# Patient Record
Sex: FEMALE | Race: WHITE | Employment: OTHER | URBAN - METROPOLITAN AREA
[De-identification: names, ages, dates, MRNs, and addresses within clinical notes are randomized per-mention and may not be internally consistent; named-entity substitution may affect disease eponyms.]

---

## 2019-04-29 ENCOUNTER — TRANSFERRED RECORDS (OUTPATIENT)
Dept: HEALTH INFORMATION MANAGEMENT | Facility: CLINIC | Age: 74
End: 2019-04-29

## 2019-06-13 ENCOUNTER — TRANSFERRED RECORDS (OUTPATIENT)
Dept: HEALTH INFORMATION MANAGEMENT | Facility: CLINIC | Age: 74
End: 2019-06-13

## 2019-07-19 ENCOUNTER — DOCUMENTATION ONLY (OUTPATIENT)
Dept: CARE COORDINATION | Facility: CLINIC | Age: 74
End: 2019-07-19

## 2019-07-22 ENCOUNTER — PRE VISIT (OUTPATIENT)
Dept: NEUROLOGY | Facility: CLINIC | Age: 74
End: 2019-07-22

## 2019-07-22 NOTE — TELEPHONE ENCOUNTER
FUTURE VISIT INFORMATION      FUTURE VISIT INFORMATION:    Date: 8/1/2019    Time: 830AM    [place] [facility] [place]ERR[place] INFORMATION:    Referring provider:  Self    Referring providers clinic:      Reason for visit/diagnosis  Headaches     RECORDS REQUESTED FROM:       Clinic name Comments Records Status Imaging Status   [facility]   Requested  Requested to PACS

## 2019-11-12 ENCOUNTER — PRE VISIT (OUTPATIENT)
Dept: NEUROLOGY | Facility: CLINIC | Age: 74
End: 2019-11-12

## 2020-09-22 NOTE — TELEPHONE ENCOUNTER
FUTURE VISIT INFORMATION      FUTURE VISIT INFORMATION:    Date: 1/22/2020    Time: 11AM    [place] [facility] [place] INFORMATION:    Referring provider:  Self     Referring providers clinic:      Reason for visit/diagnosis  Headaches     RECORDS REQUESTED FROM:       Clinic name Comments Records Status Imaging Status   [facility]  Care Everywhere N/A    [facility]  Care [facility] N/A                                
Hide Include Location In Plan Question?: No
Detail Level: Generalized
Include Location In Plan?: Yes
Detail Level: Zone
Detail Level: Detailed

## 2020-12-14 DIAGNOSIS — R41.3 AMNESIA: Primary | ICD-10-CM

## 2020-12-14 PROBLEM — M15.0 PRIMARY OSTEOARTHRITIS INVOLVING MULTIPLE JOINTS: Status: ACTIVE | Noted: 2017-02-22

## 2020-12-14 PROBLEM — Z01.83 BLOOD TYPING ENCOUNTER: Status: ACTIVE | Noted: 2017-03-20

## 2020-12-14 PROBLEM — K57.30 DIVERTICULAR DISEASE OF LARGE INTESTINE: Status: ACTIVE | Noted: 2020-01-30

## 2020-12-14 PROBLEM — L97.929: Status: ACTIVE | Noted: 2019-01-30

## 2020-12-14 PROBLEM — E66.9 OBESITY: Status: ACTIVE | Noted: 2020-12-14

## 2020-12-14 PROBLEM — E73.9 LACTOSE INTOLERANCE: Status: ACTIVE | Noted: 2020-10-14

## 2020-12-14 PROBLEM — K58.0 IRRITABLE BOWEL SYNDROME WITH DIARRHEA: Status: ACTIVE | Noted: 2020-10-14

## 2020-12-14 PROBLEM — M47.816 SPONDYLOSIS OF LUMBAR REGION WITHOUT MYELOPATHY OR RADICULOPATHY: Status: ACTIVE | Noted: 2017-03-07

## 2020-12-14 PROBLEM — F41.8 DEPRESSION WITH ANXIETY: Status: ACTIVE | Noted: 2020-12-14

## 2020-12-14 PROBLEM — K57.92 DIVERTICULITIS: Status: ACTIVE | Noted: 2020-12-14

## 2020-12-14 PROBLEM — R51.9 CHRONIC HEADACHE DISORDER: Status: ACTIVE | Noted: 2020-12-14

## 2020-12-14 PROBLEM — I50.31: Status: ACTIVE | Noted: 2019-10-12

## 2020-12-14 PROBLEM — G89.29 CHRONIC HEADACHE DISORDER: Status: ACTIVE | Noted: 2020-12-14

## 2020-12-14 PROBLEM — I83.029: Status: ACTIVE | Noted: 2019-01-30

## 2020-12-14 RX ORDER — DONEPEZIL HYDROCHLORIDE 10 MG/1
10 TABLET, FILM COATED ORAL AT BEDTIME
COMMUNITY
Start: 2019-04-29 | End: 2020-12-14

## 2020-12-14 NOTE — TELEPHONE ENCOUNTER
Patient is overdue for follow up. Will call and schedule her a video or face to face   Medication T'd for review and signature    Galo Louis CMA on 12/14/2020 at 10:48 AM

## 2020-12-14 NOTE — TELEPHONE ENCOUNTER
Pt calling to get her Donepezil refilled. Sh mentioned getting it from her PMD, but is having issues with that.She uses [facility] [place] on [address].  Please call to discuss. Ok to lm

## 2020-12-15 RX ORDER — DONEPEZIL HYDROCHLORIDE 10 MG/1
10 TABLET, FILM COATED ORAL AT BEDTIME
Qty: 30 TABLET | Refills: 0 | Status: SHIPPED | OUTPATIENT
Start: 2020-12-15 | End: 2021-01-14

## 2021-02-11 ENCOUNTER — IMMUNIZATION (OUTPATIENT)
Dept: PEDIATRICS | Facility: CLINIC | Age: 76
End: 2021-02-11
Payer: COMMERCIAL

## 2021-02-11 PROCEDURE — 91300 PR COVID VAC PFIZER DIL RECON 30 MCG/0.3 ML IM: CPT

## 2021-02-11 PROCEDURE — 0001A PR COVID VAC PFIZER DIL RECON 30 MCG/0.3 ML IM: CPT

## 2021-03-04 ENCOUNTER — IMMUNIZATION (OUTPATIENT)
Dept: PEDIATRICS | Facility: CLINIC | Age: 76
End: 2021-03-04
Attending: INTERNAL MEDICINE
Payer: COMMERCIAL

## 2021-03-04 PROCEDURE — 91300 PR COVID VAC PFIZER DIL RECON 30 MCG/0.3 ML IM: CPT

## 2021-03-04 PROCEDURE — 0002A PR COVID VAC PFIZER DIL RECON 30 MCG/0.3 ML IM: CPT

## 2021-03-28 ENCOUNTER — HEALTH MAINTENANCE LETTER (OUTPATIENT)
Age: 76
End: 2021-03-28

## 2021-05-28 ENCOUNTER — RECORDS - HEALTHEAST (OUTPATIENT)
Dept: ADMINISTRATIVE | Facility: CLINIC | Age: 76
End: 2021-05-28

## 2021-09-11 ENCOUNTER — HEALTH MAINTENANCE LETTER (OUTPATIENT)
Age: 76
End: 2021-09-11

## 2021-11-19 ENCOUNTER — MEDICAL CORRESPONDENCE (OUTPATIENT)
Dept: HEALTH INFORMATION MANAGEMENT | Facility: CLINIC | Age: 76
End: 2021-11-19
Payer: COMMERCIAL

## 2021-11-23 ENCOUNTER — TRANSCRIBE ORDERS (OUTPATIENT)
Dept: OTHER | Age: 76
End: 2021-11-23
Payer: COMMERCIAL

## 2021-11-23 DIAGNOSIS — G44.89 HEADACHE SYNDROME: Primary | ICD-10-CM

## 2021-12-10 ENCOUNTER — OFFICE VISIT (OUTPATIENT)
Dept: NEUROLOGY | Facility: CLINIC | Age: 76
End: 2021-12-10
Payer: COMMERCIAL

## 2021-12-10 VITALS
DIASTOLIC BLOOD PRESSURE: 91 MMHG | SYSTOLIC BLOOD PRESSURE: 172 MMHG | WEIGHT: 180 LBS | HEART RATE: 83 BPM | HEIGHT: 64 IN | BODY MASS INDEX: 30.73 KG/M2

## 2021-12-10 DIAGNOSIS — Z82.49 FAMILY HISTORY OF ANEURYSM: Primary | ICD-10-CM

## 2021-12-10 DIAGNOSIS — G43.719 INTRACTABLE CHRONIC MIGRAINE WITHOUT AURA AND WITHOUT STATUS MIGRAINOSUS: ICD-10-CM

## 2021-12-10 PROCEDURE — 99205 OFFICE O/P NEW HI 60 MIN: CPT | Performed by: PSYCHIATRY & NEUROLOGY

## 2021-12-10 RX ORDER — SUMATRIPTAN 25 MG/1
25 TABLET, FILM COATED ORAL
Qty: 18 TABLET | Refills: 1 | Status: SHIPPED | OUTPATIENT
Start: 2021-12-10 | End: 2022-10-12

## 2021-12-10 RX ORDER — AMLODIPINE BESYLATE 5 MG/1
5 TABLET ORAL DAILY
COMMUNITY

## 2021-12-10 RX ORDER — FAMOTIDINE 20 MG/1
TABLET, FILM COATED ORAL
COMMUNITY
Start: 2021-05-28 | End: 2022-10-12

## 2021-12-10 RX ORDER — LEVOTHYROXINE SODIUM 112 UG/1
TABLET ORAL
COMMUNITY
Start: 2021-09-01 | End: 2022-10-12

## 2021-12-10 RX ORDER — AMLODIPINE BESYLATE 2.5 MG/1
2.5 TABLET ORAL
COMMUNITY
Start: 2021-09-09 | End: 2022-10-12

## 2021-12-10 RX ORDER — DONEPEZIL HYDROCHLORIDE 10 MG/1
TABLET, FILM COATED ORAL
COMMUNITY
Start: 2020-08-14

## 2021-12-10 RX ORDER — TOPIRAMATE 25 MG/1
TABLET, FILM COATED ORAL
Qty: 120 TABLET | Refills: 1 | Status: SHIPPED | OUTPATIENT
Start: 2021-12-10 | End: 2022-10-12

## 2021-12-10 RX ORDER — PAROXETINE 40 MG/1
40 TABLET, FILM COATED ORAL EVERY MORNING
COMMUNITY
Start: 2021-08-23 | End: 2022-10-12

## 2021-12-10 RX ORDER — DIETHYLTOLUAMIDE 7 %
600 SPRAY, NON-AEROSOL (ML) TOPICAL
COMMUNITY
Start: 2021-05-15

## 2021-12-10 RX ORDER — ACETAMINOPHEN 500 MG
1000 TABLET ORAL 4 TIMES DAILY
COMMUNITY

## 2021-12-10 RX ORDER — LISINOPRIL 40 MG/1
TABLET ORAL
COMMUNITY
Start: 2021-08-01 | End: 2022-10-12

## 2021-12-10 RX ORDER — CYCLOBENZAPRINE HCL 5 MG
5 TABLET ORAL AT BEDTIME
COMMUNITY
Start: 2021-01-21 | End: 2022-10-12

## 2021-12-10 RX ORDER — NITROGLYCERIN 0.4 MG/1
TABLET SUBLINGUAL
COMMUNITY
Start: 2021-09-09 | End: 2022-10-12

## 2021-12-10 RX ORDER — HYDROCHLOROTHIAZIDE 12.5 MG/1
12.5 TABLET ORAL DAILY
COMMUNITY
Start: 2021-09-01 | End: 2022-10-12

## 2021-12-10 RX ORDER — FUROSEMIDE 20 MG
20 TABLET ORAL EVERY MORNING
COMMUNITY
Start: 2021-08-01 | End: 2022-10-27

## 2021-12-10 RX ORDER — MULTIVITAMIN,THERAPEUTIC
1 TABLET ORAL DAILY
COMMUNITY
End: 2022-10-12

## 2021-12-10 RX ORDER — ACETAMINOPHEN 500 MG
500 TABLET ORAL
COMMUNITY
Start: 2021-10-23 | End: 2022-10-12

## 2021-12-10 RX ORDER — NADOLOL 40 MG/1
40 TABLET ORAL DAILY
COMMUNITY
Start: 2021-08-23 | End: 2022-10-12

## 2021-12-10 ASSESSMENT — MIFFLIN-ST. JEOR: SCORE: 1281.47

## 2021-12-10 NOTE — NURSING NOTE
Chief Complaint   Patient presents with     New Patient     headache      Melany Muhammad MA on 12/10/2021 at 11:58 AM

## 2021-12-10 NOTE — LETTER
12/10/2021         RE: Mackenzie Brown  [address]  [place] [address]        Dear Colleague,    Thank you for referring your patient, Mackenzie Brown, to the [facility] [place]. Please see a copy of my visit note below.    NEUROLOGY OUTPATIENT CONSULT NOTE   Dec 10, 2021     CHIEF COMPLAINT/REASON FOR VISIT/REASON FOR CONSULT  Patient presents with:  New Patient: headache     REASON FOR CONSULTATION- Headaches    REFERRAL SOURCE  Dr. Quinn Lainez  CC Dr. Quinn Lainez    HISTORY OF PRESENT ILLNESS  Mackenzie Brown is a 76 year old female seen today for evaluation of headaches.  She reports that she has been having headaches since age 12.  Headaches used to be severe that she was throwing up and had to stay in a dark room.  She is tried multiple medications when she was in her 20s.  She eventually outgrew her migraines and they went away.  She did find benefit with some Imitrex at that point.  Headaches improved with use of meditation, biofeedback.  Not take any medications that really helped her.    Over the last 2 to 3 years the headaches have been coming back.  There slowly getting more and more progressive.  She has been using Tylenol 2 tablets in the morning 2 tablets in the evening.  When headaches are more severe she will get Excedrin Migraine.  Has had a pacemaker put in in the last 2 weeks.  Headaches are generally frontal.  They are dull in nature.  No significant photophobia or phonophobia though occasionally can get auras.  They happen about once a year.  Has had Covid recently which has not affected the headaches.  Currently has not tried any prescription medications.  Does have issues with sleep as she does not use her CPAP.  Uses trazodone at nighttime for sleep.    Her daughter has history of cerebral aneurysms.  She is a smoker.  No other family members have cerebral aneurysms.    Previous history is reviewed and this is unchanged.    PAST  MEDICAL/SURGICAL HISTORY  No past medical history on file.  Patient Active Problem List   Diagnosis     Adrenal nodule (H)     PAF (paroxysmal atrial fibrillation) (H)     AK (actinic keratosis)     Amnesia     Blood typing encounter     Bunion     Cataracts, both eyes     CHF (congestive heart failure), NYHA class I, acute, diastolic (H)     Chronic back pain     Chronic headache disorder     Complications of medical care     Depression with anxiety     Diverticular disease of large intestine     Diverticulitis     Diverticulitis of colon     Eczema     Fibula fracture     GERD (gastroesophageal reflux disease)     History of colonic polyps     Hypertension     Insomnia     Irritable bowel syndrome with diarrhea     Knee pain, left     Lactose intolerance     Major depressive disorder, recurrent episode, mild (H)     Malignant neoplasm of skin     Migraine     Mild cognitive impairment     Multiple thyroid nodules     Obesity     Osteoarthrosis     Primary osteoarthritis involving multiple joints     Senile osteoporosis     Sensorineural hearing loss, asymmetrical     Spondylosis of lumbar region without myelopathy or radiculopathy     Tibial plateau fracture     Ulcer of varicose vein of left leg (H)     Wrist fracture, left     Neck pain   Significant for high blood pressure, migraines, arthritis, osteoporosis, hypothyroidism, pacemaker.  No kidney stone history    FAMILY HISTORY  No family history on file.   Brother with aneurysm.  Has history of smoking.    SOCIAL HISTORY  Social History     Tobacco Use     Smoking status: Never Smoker     Smokeless tobacco: Never Used   Substance Use Topics     Alcohol use: Yes     Drug use: Never       SYSTEMS REVIEW  Twelve-system ROS was done and other than the HPI this was negative except for neck pain, back pain, difficulty walking, balance coordination problems, dizziness, vision symptoms, sleepiness during the day, headaches, anxiety, cardiac/heart problems, snoring  "loudly, difficulty breathing during sleep    MEDICATIONS  acetaminophen (TYLENOL) 500 MG tablet, Take 500-1,000 mg by mouth every 6 hours as needed for mild pain  acetaminophen (TYLENOL) 500 MG tablet, Take 500 mg by mouth  amLODIPine (NORVASC) 2.5 MG tablet, Take 2.5 mg by mouth  amLODIPine (NORVASC) 5 MG tablet, Take 5 mg by mouth daily  apixaban ANTICOAGULANT (ELIQUIS) 5 MG tablet, Take 5 mg by mouth 2 times daily  ascorbic acid 1000 MG TABS tablet, Take 1 tablet by mouth daily  Calcium Polycarbophil 625 MG CHEW, Take 500 mg by mouth  Calcium-Magnesium-Vitamin D (CITRACAL SLOW RELEASE) 600- MG-MG-UNIT TB24, Take 600 mg by mouth  cholecalciferol 50 MCG (2000 UT) CAPS, Take 2,000 Units by mouth  cyclobenzaprine (FLEXERIL) 5 MG tablet, Take 5 mg by mouth At Bedtime  donepezil (ARICEPT) 10 MG tablet,   famotidine (PEPCID) 20 MG tablet, Take 1 Tablet (20 mg) by mouth 2 times daily.  furosemide (LASIX) 20 MG tablet, Take 20 mg by mouth every morning  hydrochlorothiazide (HYDRODIURIL) 12.5 MG tablet, Take 12.5 mg by mouth daily  levothyroxine (SYNTHROID/LEVOTHROID) 112 MCG tablet, Take 1 Tablet  by mouth before breakfast.  lisinopril (ZESTRIL) 40 MG tablet, Take one tablet by mouth once daily at bedtime  Multiple Vitamin (THERA-TABS) TABS, Take 1 tablet by mouth daily  nadolol (CORGARD) 40 MG tablet, Take 40 mg by mouth daily  nitroGLYcerin (NITROSTAT) 0.4 MG sublingual tablet, dissolve 1 Tablet (0.4 mg) under the tongue every 5 minutes as needed  PARoxetine (PAXIL) 40 MG tablet, Take 40 mg by mouth every morning    No current facility-administered medications on file prior to visit.       PHYSICAL EXAMINATION  VITALS: BP (!) 172/91 (BP Location: Left arm, Patient Position: Sitting)   Pulse 83   Ht 1.626 m (5' 4\")   Wt 81.6 kg (180 lb)   BMI 30.90 kg/m    GENERAL: Healthy appearing, alert, no acute distress, normal habitus.  CARDIOVASCULAR: Extremities warm and well perfused. Pulses present.   EYES: " Funduscopic exam limited.  NEUROLOGICAL:  Patient is awake and oriented to self, place and time.  Attention span is normal.  Memory is grossly intact.  Language is fluent and follows commands appropriately.  Appropriate fund of knowledge. Cranial nerves 2-12 are intact. There is no pronator drift.  Motor exam shows 5/5 strength in all extremities.  Tone is symmetric bilaterally in upper and lower extremities.  Reflexes are symmetric and 1+ in upper extremities and lower extremities. Sensory exam is grossly intact to light touch, pin prick and vibration.  Finger to nose and heel to shin is without dysmetria.  Romberg is negative.  Gait is normal and the patient is able to do tandem walk and walk on toes and heels.    DIAGNOSTICS  MRI  MRI brain 2016  1 mild age-related changes  2.  No significant superimposed intracranial finding    Neuropsych 2014  DIAGNOSTIC IMPRESSIONS   Mild Cognitive Impairment, amnestic type   Anxiety, by history     Head CT  IMPRESSION:     1.  No evidence of acute intracranial hemorrhage or mass effect.   2.  Mild nonspecific white matter changes.   3.  Mild brain parenchymal volume loss.    CT angiogram  CONCLUSIONS:   1. The patient's total Agatston calcium score is zero.   2. Angiographically normal coronary arteries in all visualized segments by   CT criteria.   3. Well-opacified left atrial appendage without thrombus.     4. Please see radiology report below for incidental non-cardiac findings.     CT head Jan 2015  IMPRESSION:   1.  Redemonstrated small posterior right parietal scalp subgaleal hematoma with no skull fracture. No acute intracranial abnormality.    RELEVANT LABS  TSH 18.77  T4 1.32-on medication  TSH 0.25 on medication    OUTSIDE RECORDS  Previous notes from 2016 from Dr. Harrington were reviewed.  Patient's been diagnosed with Alzheimer's dementia.  Was put on Aricept at that time.  She was having headaches at that time.  These were thought to be medication overuse  headaches.  Other chart notes were reviewed.  No relevant notes available.    IMPRESSION/REPORT/PLAN  Family history of cerebral aneurysm  Intractable chronic migraine without aura and without status migrainosus  Sleep apnea/insomnia     This is a 76 year old female with history of migraines with resolution with recurrence of headaches that are much more frequent and severe and family history of cerebral aneurysms in her daughter who is also a smoker.  Patient is never smoked in her lifetime.  I would like to screen her for aneurysms given the family history and given the worsening of headaches.  She did have a head CT that was normal.  We will check a MRI of the brain.  She does have history of hypothyroidism.  Coronary angiogram was negative for coronary artery disease.  We will check blood work to look for other causes of headaches.    In terms of treatment of headache I will start her on Topamax.  Antidepressant was discussed and she is on Paxil and does not want to try more antidepressants.  We further talked about using a beta-blocker though she is already on nadolol and other blood pressure medications.  Possibly her headaches could be related to sleep apnea and I would encourage her to use her mask.      Will use Imitrex for abortive therapy.  It is possibly helped in the past.  She does not have any history of coronary artery disease.  Encouraged her not to overuse her abortive medications.  Discussed risk of medication overuse headaches.    Would encourage her to keep a log of her headaches I can see her back in 3 months.    -     MRA Head w/o Contrast Angiogram; Future  -     SUMAtriptan (IMITREX) 25 MG tablet; Take 1 tablet (25 mg) by mouth at onset of headache for migraine May repeat in 2 hours.  -     Vitamin B12; Future  -     Ferritin; Future  -     topiramate (TOPAMAX) 25 MG tablet; Start with 1 tab/night and increase by 1 tablet/week as needed and tolerated to a max of 2 tabs/night.    Return in  about 3 months (around 3/10/2022) for In-Clinic Visit, After testing.    Over 64 minutes were spent coordinating the care for the patient on the day of the encounter.  This includes previsit, during visit and post visit activities as documented above.  Multiple tests reviewed with the patient.  Counseling patient.  Multiple problems addressed.  (Activities include but not inclusive of reviewing chart, reviewing outside records, reviewing labs and imaging study results as well as the images, patient visit time including getting history and exam,  use if applicable, review of test results with the patient and coming up with a plan in a shared model, counseling patient and family, education and answering patient questions, EMR , EMR diagnosis entry and problem list management, medication reconciliation and prescription management if applicable, paperwork if applicable, printing documents and documentation of the visit activities.)  Billing:-4 data points, 4 problem points, 4 risk points  MDM:-High risk with extensive testing    Luisito Trinh MD  Neurologist  [facility]  Tel:- [phone]    This note was dictated using voice recognition software.  Any grammatical or context distortions are unintentional and inherent to the software.        Again, thank you for allowing me to participate in the care of your patient.        Sincerely,        Luisito Trinh MD

## 2021-12-10 NOTE — PROGRESS NOTES
NEUROLOGY OUTPATIENT CONSULT NOTE   Dec 10, 2021     CHIEF COMPLAINT/REASON FOR VISIT/REASON FOR CONSULT  Patient presents with:  New Patient: headache     REASON FOR CONSULTATION- Headaches    REFERRAL SOURCE  Dr. Quinn Lainez  CC Dr. Quinn Lainez    HISTORY OF PRESENT ILLNESS  Mackenzie Brown is a 76 year old female seen today for evaluation of headaches.  She reports that she has been having headaches since age 12.  Headaches used to be severe that she was throwing up and had to stay in a dark room.  She is tried multiple medications when she was in her 20s.  She eventually outgrew her migraines and they went away.  She did find benefit with some Imitrex at that point.  Headaches improved with use of meditation, biofeedback.  Not take any medications that really helped her.    Over the last 2 to 3 years the headaches have been coming back.  There slowly getting more and more progressive.  She has been using Tylenol 2 tablets in the morning 2 tablets in the evening.  When headaches are more severe she will get Excedrin Migraine.  Has had a pacemaker put in in the last 2 weeks.  Headaches are generally frontal.  They are dull in nature.  No significant photophobia or phonophobia though occasionally can get auras.  They happen about once a year.  Has had Covid recently which has not affected the headaches.  Currently has not tried any prescription medications.  Does have issues with sleep as she does not use her CPAP.  Uses trazodone at nighttime for sleep.    Her daughter has history of cerebral aneurysms.  She is a smoker.  No other family members have cerebral aneurysms.    Previous history is reviewed and this is unchanged.    PAST MEDICAL/SURGICAL HISTORY  No past medical history on file.  Patient Active Problem List   Diagnosis     Adrenal nodule (H)     PAF (paroxysmal atrial fibrillation) (H)     AK (actinic keratosis)     Amnesia     Blood typing encounter     Bunion     Cataracts, both eyes      CHF (congestive heart failure), NYHA class I, acute, diastolic (H)     Chronic back pain     Chronic headache disorder     Complications of medical care     Depression with anxiety     Diverticular disease of large intestine     Diverticulitis     Diverticulitis of colon     Eczema     Fibula fracture     GERD (gastroesophageal reflux disease)     History of colonic polyps     Hypertension     Insomnia     Irritable bowel syndrome with diarrhea     Knee pain, left     Lactose intolerance     Major depressive disorder, recurrent episode, mild (H)     Malignant neoplasm of skin     Migraine     Mild cognitive impairment     Multiple thyroid nodules     Obesity     Osteoarthrosis     Primary osteoarthritis involving multiple joints     Senile osteoporosis     Sensorineural hearing loss, asymmetrical     Spondylosis of lumbar region without myelopathy or radiculopathy     Tibial plateau fracture     Ulcer of varicose vein of left leg (H)     Wrist fracture, left     Neck pain   Significant for high blood pressure, migraines, arthritis, osteoporosis, hypothyroidism, pacemaker.  No kidney stone history    FAMILY HISTORY  No family history on file.   Brother with aneurysm.  Has history of smoking.    SOCIAL HISTORY  Social History     Tobacco Use     Smoking status: Never Smoker     Smokeless tobacco: Never Used   Substance Use Topics     Alcohol use: Yes     Drug use: Never       SYSTEMS REVIEW  Twelve-system ROS was done and other than the HPI this was negative except for neck pain, back pain, difficulty walking, balance coordination problems, dizziness, vision symptoms, sleepiness during the day, headaches, anxiety, cardiac/heart problems, snoring loudly, difficulty breathing during sleep    MEDICATIONS  acetaminophen (TYLENOL) 500 MG tablet, Take 500-1,000 mg by mouth every 6 hours as needed for mild pain  acetaminophen (TYLENOL) 500 MG tablet, Take 500 mg by mouth  amLODIPine (NORVASC) 2.5 MG tablet, Take 2.5 mg by  "mouth  amLODIPine (NORVASC) 5 MG tablet, Take 5 mg by mouth daily  apixaban ANTICOAGULANT (ELIQUIS) 5 MG tablet, Take 5 mg by mouth 2 times daily  ascorbic acid 1000 MG TABS tablet, Take 1 tablet by mouth daily  Calcium Polycarbophil 625 MG CHEW, Take 500 mg by mouth  Calcium-Magnesium-Vitamin D (CITRACAL SLOW RELEASE) 600- MG-MG-UNIT TB24, Take 600 mg by mouth  cholecalciferol 50 MCG (2000 UT) CAPS, Take 2,000 Units by mouth  cyclobenzaprine (FLEXERIL) 5 MG tablet, Take 5 mg by mouth At Bedtime  donepezil (ARICEPT) 10 MG tablet,   famotidine (PEPCID) 20 MG tablet, Take 1 Tablet (20 mg) by mouth 2 times daily.  furosemide (LASIX) 20 MG tablet, Take 20 mg by mouth every morning  hydrochlorothiazide (HYDRODIURIL) 12.5 MG tablet, Take 12.5 mg by mouth daily  levothyroxine (SYNTHROID/LEVOTHROID) 112 MCG tablet, Take 1 Tablet  by mouth before breakfast.  lisinopril (ZESTRIL) 40 MG tablet, Take one tablet by mouth once daily at bedtime  Multiple Vitamin (THERA-TABS) TABS, Take 1 tablet by mouth daily  nadolol (CORGARD) 40 MG tablet, Take 40 mg by mouth daily  nitroGLYcerin (NITROSTAT) 0.4 MG sublingual tablet, dissolve 1 Tablet (0.4 mg) under the tongue every 5 minutes as needed  PARoxetine (PAXIL) 40 MG tablet, Take 40 mg by mouth every morning    No current facility-administered medications on file prior to visit.       PHYSICAL EXAMINATION  VITALS: BP (!) 172/91 (BP Location: Left arm, Patient Position: Sitting)   Pulse 83   Ht 1.626 m (5' 4\")   Wt 81.6 kg (180 lb)   BMI 30.90 kg/m    GENERAL: Healthy appearing, alert, no acute distress, normal habitus.  CARDIOVASCULAR: Extremities warm and well perfused. Pulses present.   EYES: Funduscopic exam limited.  NEUROLOGICAL:  Patient is awake and oriented to self, place and time.  Attention span is normal.  Memory is grossly intact.  Language is fluent and follows commands appropriately.  Appropriate fund of knowledge. Cranial nerves 2-12 are intact. There is no " pronator drift.  Motor exam shows 5/5 strength in all extremities.  Tone is symmetric bilaterally in upper and lower extremities.  Reflexes are symmetric and 1+ in upper extremities and lower extremities. Sensory exam is grossly intact to light touch, pin prick and vibration.  Finger to nose and heel to shin is without dysmetria.  Romberg is negative.  Gait is normal and the patient is able to do tandem walk and walk on toes and heels.    DIAGNOSTICS  MRI  MRI brain 2016  1 mild age-related changes  2.  No significant superimposed intracranial finding    Neuropsych 2014  DIAGNOSTIC IMPRESSIONS   Mild Cognitive Impairment, amnestic type   Anxiety, by history     Head CT  IMPRESSION:     1.  No evidence of acute intracranial hemorrhage or mass effect.   2.  Mild nonspecific white matter changes.   3.  Mild brain parenchymal volume loss.    CT angiogram  CONCLUSIONS:   1. The patient's total Agatston calcium score is zero.   2. Angiographically normal coronary arteries in all visualized segments by   CT criteria.   3. Well-opacified left atrial appendage without thrombus.     4. Please see radiology report below for incidental non-cardiac findings.     CT head Jan 2015  IMPRESSION:   1.  Redemonstrated small posterior right parietal scalp subgaleal hematoma with no skull fracture. No acute intracranial abnormality.    RELEVANT LABS  TSH 18.77  T4 1.32-on medication  TSH 0.25 on medication    OUTSIDE RECORDS  Previous notes from 2016 from Dr. Harrington were reviewed.  Patient's been diagnosed with Alzheimer's dementia.  Was put on Aricept at that time.  She was having headaches at that time.  These were thought to be medication overuse headaches.  Other chart notes were reviewed.  No relevant notes available.    IMPRESSION/REPORT/PLAN  Family history of cerebral aneurysm  Intractable chronic migraine without aura and without status migrainosus  Sleep apnea/insomnia     This is a 76 year old female with history of  migraines with resolution with recurrence of headaches that are much more frequent and severe and family history of cerebral aneurysms in her daughter who is also a smoker.  Patient is never smoked in her lifetime.  I would like to screen her for aneurysms given the family history and given the worsening of headaches.  She did have a head CT that was normal.  We will check a MRI of the brain.  She does have history of hypothyroidism.  Coronary angiogram was negative for coronary artery disease.  We will check blood work to look for other causes of headaches.    In terms of treatment of headache I will start her on Topamax.  Antidepressant was discussed and she is on Paxil and does not want to try more antidepressants.  We further talked about using a beta-blocker though she is already on nadolol and other blood pressure medications.  Possibly her headaches could be related to sleep apnea and I would encourage her to use her mask.      Will use Imitrex for abortive therapy.  It is possibly helped in the past.  She does not have any history of coronary artery disease.  Encouraged her not to overuse her abortive medications.  Discussed risk of medication overuse headaches.    Would encourage her to keep a log of her headaches I can see her back in 3 months.    -     MRA Head w/o Contrast Angiogram; Future  -     SUMAtriptan (IMITREX) 25 MG tablet; Take 1 tablet (25 mg) by mouth at onset of headache for migraine May repeat in 2 hours.  -     Vitamin B12; Future  -     Ferritin; Future  -     topiramate (TOPAMAX) 25 MG tablet; Start with 1 tab/night and increase by 1 tablet/week as needed and tolerated to a max of 2 tabs/night.    Return in about 3 months (around 3/10/2022) for In-Clinic Visit, After testing.    Over 64 minutes were spent coordinating the care for the patient on the day of the encounter.  This includes previsit, during visit and post visit activities as documented above.  Multiple tests reviewed with the  patient.  Counseling patient.  Multiple problems addressed.  (Activities include but not inclusive of reviewing chart, reviewing outside records, reviewing labs and imaging study results as well as the images, patient visit time including getting history and exam,  use if applicable, review of test results with the patient and coming up with a plan in a shared model, counseling patient and family, education and answering patient questions, EMR , EMR diagnosis entry and problem list management, medication reconciliation and prescription management if applicable, paperwork if applicable, printing documents and documentation of the visit activities.)  Billing:-4 data points, 4 problem points, 4 risk points  MDM:-High risk with extensive testing    Luisito Trinh MD  Neurologist  [facility] Neurology [facility]  Tel:- [phone]    This note was dictated using voice recognition software.  Any grammatical or context distortions are unintentional and inherent to the software.

## 2021-12-15 ENCOUNTER — TELEPHONE (OUTPATIENT)
Dept: NEUROLOGY | Facility: CLINIC | Age: 76
End: 2021-12-15

## 2021-12-15 NOTE — TELEPHONE ENCOUNTER
Medication Appeal Request    Please initiate an appeal for the requested medication:     Has a letter of medical necessity been completed in EPIC?      Any additional lab values/information to include?     Would you like to include any research articles?               If yes please include the hyperlink(s) below or fax to    [phone] for Specialty/Retail               [phone] for Infusion/Clinic Administered.                Include the patients name and MRN on the fax cover sheet.

## 2021-12-16 NOTE — TELEPHONE ENCOUNTER
Prior Authorization Not Needed per Insurance    Medication: Topiramate 25mg-PA Not Needed  Insurance Company: [employer] - Phone [phone] Fax [phone]  Expected CoPay:      Pharmacy Filling the Rx: [id number] - [place] [address] [address]  Pharmacy Notified: Yes  Patient Notified: No    Confirmed with pharmacy, they were able to get a paid claim for quantity of #120 per 64 days for $2.81.

## 2022-04-23 ENCOUNTER — HEALTH MAINTENANCE LETTER (OUTPATIENT)
Age: 77
End: 2022-04-23

## 2022-10-11 ENCOUNTER — LAB REQUISITION (OUTPATIENT)
Dept: LAB | Facility: CLINIC | Age: 77
End: 2022-10-11
Payer: COMMERCIAL

## 2022-10-11 DIAGNOSIS — I10 ESSENTIAL (PRIMARY) HYPERTENSION: ICD-10-CM

## 2022-10-11 PROBLEM — R19.8 IRREGULAR BOWEL HABITS: Status: ACTIVE | Noted: 2019-07-31

## 2022-10-11 PROBLEM — I49.5 TACHY-BRADY SYNDROME (H): Status: ACTIVE | Noted: 2021-12-07

## 2022-10-11 PROBLEM — G47.00 INSOMNIA: Status: ACTIVE | Noted: 2020-12-14

## 2022-10-11 PROBLEM — R19.4 CHANGE IN BOWEL HABITS: Status: ACTIVE | Noted: 2022-10-11

## 2022-10-11 PROBLEM — G47.33 OSA ON CPAP: Status: ACTIVE | Noted: 2021-02-24

## 2022-10-11 PROBLEM — E53.8 B12 DEFICIENCY: Status: ACTIVE | Noted: 2022-04-11

## 2022-10-11 PROBLEM — K64.9 HEMORRHOIDS: Status: ACTIVE | Noted: 2020-01-30

## 2022-10-11 PROBLEM — I50.33 ACUTE ON CHRONIC HEART FAILURE WITH PRESERVED EJECTION FRACTION (H): Status: ACTIVE | Noted: 2019-10-12

## 2022-10-11 PROBLEM — R09.02 HYPOXEMIA: Status: ACTIVE | Noted: 2021-02-24

## 2022-10-11 PROBLEM — I48.0 PAF (PAROXYSMAL ATRIAL FIBRILLATION) (H): Status: ACTIVE | Noted: 2018-09-22

## 2022-10-11 PROBLEM — R07.9 CHEST PAIN: Status: ACTIVE | Noted: 2021-09-01

## 2022-10-11 PROBLEM — E89.0 POSTOPERATIVE HYPOTHYROIDISM: Status: ACTIVE | Noted: 2021-02-24

## 2022-10-11 PROBLEM — K62.5 HEMORRHAGE OF RECTUM AND ANUS: Status: ACTIVE | Noted: 2019-07-31

## 2022-10-11 PROBLEM — S06.0XAA CONCUSSION: Status: ACTIVE | Noted: 2021-01-13

## 2022-10-11 RX ORDER — ONDANSETRON 4 MG/1
4 TABLET, FILM COATED ORAL EVERY 8 HOURS PRN
COMMUNITY
End: 2024-03-28

## 2022-10-11 RX ORDER — SENNOSIDES 8.6 MG
1 TABLET ORAL 2 TIMES DAILY
COMMUNITY
End: 2024-07-17

## 2022-10-11 RX ORDER — TRAZODONE HYDROCHLORIDE 50 MG/1
50 TABLET, FILM COATED ORAL AT BEDTIME
COMMUNITY

## 2022-10-11 RX ORDER — PAROXETINE 40 MG/1
40 TABLET, FILM COATED ORAL EVERY MORNING
COMMUNITY

## 2022-10-11 RX ORDER — PANTOPRAZOLE SODIUM 40 MG/1
1 FOR SUSPENSION ORAL DAILY
COMMUNITY

## 2022-10-11 RX ORDER — BUPROPION HYDROCHLORIDE 150 MG/1
150 TABLET, EXTENDED RELEASE ORAL DAILY
COMMUNITY
End: 2024-03-28

## 2022-10-11 RX ORDER — MORPHINE SULFATE 15 MG/1
15 TABLET ORAL EVERY 6 HOURS PRN
COMMUNITY
End: 2022-10-12

## 2022-10-11 RX ORDER — LEVOTHYROXINE SODIUM 125 UG/1
125 TABLET ORAL DAILY
COMMUNITY

## 2022-10-11 RX ORDER — IRBESARTAN 150 MG/1
150 TABLET ORAL AT BEDTIME
COMMUNITY

## 2022-10-11 RX ORDER — MORPHINE SULFATE 15 MG/1
30 TABLET, FILM COATED, EXTENDED RELEASE ORAL EVERY 12 HOURS
COMMUNITY
End: 2022-10-19

## 2022-10-11 RX ORDER — NITROGLYCERIN 0.4 MG/1
0.4 TABLET SUBLINGUAL EVERY 5 MIN PRN
COMMUNITY

## 2022-10-12 ENCOUNTER — TRANSITIONAL CARE UNIT VISIT (OUTPATIENT)
Dept: GERIATRICS | Facility: CLINIC | Age: 77
End: 2022-10-12
Payer: COMMERCIAL

## 2022-10-12 VITALS
RESPIRATION RATE: 18 BRPM | WEIGHT: 179.2 LBS | HEART RATE: 79 BPM | BODY MASS INDEX: 30.76 KG/M2 | SYSTOLIC BLOOD PRESSURE: 120 MMHG | OXYGEN SATURATION: 94 % | TEMPERATURE: 97 F | DIASTOLIC BLOOD PRESSURE: 81 MMHG

## 2022-10-12 DIAGNOSIS — I50.31 CHF (CONGESTIVE HEART FAILURE), NYHA CLASS I, ACUTE, DIASTOLIC (H): ICD-10-CM

## 2022-10-12 DIAGNOSIS — S12.490D COMPRESSION FRACTURE OF C5 VERTEBRA WITH ROUTINE HEALING, SUBSEQUENT ENCOUNTER: Primary | ICD-10-CM

## 2022-10-12 DIAGNOSIS — I48.0 PAF (PAROXYSMAL ATRIAL FIBRILLATION) (H): ICD-10-CM

## 2022-10-12 DIAGNOSIS — M54.9 CHRONIC BACK PAIN: Primary | ICD-10-CM

## 2022-10-12 DIAGNOSIS — S42.202D CLOSED FRACTURE OF PROXIMAL END OF LEFT HUMERUS WITH ROUTINE HEALING, UNSPECIFIED FRACTURE MORPHOLOGY, SUBSEQUENT ENCOUNTER: ICD-10-CM

## 2022-10-12 DIAGNOSIS — G89.29 CHRONIC BACK PAIN: Primary | ICD-10-CM

## 2022-10-12 DIAGNOSIS — J96.21 ACUTE ON CHRONIC RESPIRATORY FAILURE WITH HYPOXEMIA (H): ICD-10-CM

## 2022-10-12 PROBLEM — K21.9 GERD (GASTROESOPHAGEAL REFLUX DISEASE): Status: ACTIVE | Noted: 2020-12-14

## 2022-10-12 PROBLEM — S12.401A: Status: ACTIVE | Noted: 2022-10-06

## 2022-10-12 PROBLEM — S42.209A CLOSED FRACTURE OF PROXIMAL END OF HUMERUS: Status: ACTIVE | Noted: 2022-10-06

## 2022-10-12 PROBLEM — L97.929: Status: RESOLVED | Noted: 2019-01-30 | Resolved: 2022-10-12

## 2022-10-12 PROBLEM — F03.90 SENILE DEMENTIA, UNCOMPLICATED (H): Status: ACTIVE | Noted: 2022-10-06

## 2022-10-12 PROBLEM — I50.32 CHRONIC HEART FAILURE WITH PRESERVED EJECTION FRACTION (H): Status: ACTIVE | Noted: 2019-10-12

## 2022-10-12 PROBLEM — Z95.818 PRESENCE OF WATCHMAN LEFT ATRIAL APPENDAGE CLOSURE DEVICE: Status: ACTIVE | Noted: 2022-10-06

## 2022-10-12 PROBLEM — I83.029: Status: RESOLVED | Noted: 2019-01-30 | Resolved: 2022-10-12

## 2022-10-12 PROCEDURE — 99310 SBSQ NF CARE HIGH MDM 45: CPT | Performed by: NURSE PRACTITIONER

## 2022-10-12 RX ORDER — MORPHINE SULFATE 15 MG/1
15 TABLET ORAL EVERY 6 HOURS PRN
Qty: 10 TABLET | Refills: 0 | Status: SHIPPED | OUTPATIENT
Start: 2022-10-12 | End: 2022-10-17

## 2022-10-12 NOTE — LETTER
10/11/2022        RE: Mackenzie Brown  [address]  [facility] [address]        [facility]    Code Status:  FULL CODE   Visit Type:   Chief Complaint   Patient presents with     Hospital F/U     TCU Admission     Facility:  [facility] ([facility]) [[id number]]           Transitional Care Course: Mackenzie Brown is a 77 year old female who I am seeing today for admit to the TCU.  Patient recently hospitalized on 10/6/2022 at [facility].  Patient had a fall sustaining a C5 vertebral compression fracture and a left humeral fracture.  Past medical history includes generalized anxiety disorder, hypertension, hypothyroidism, obstructive sleep apnea on CPAP, GERD, presence of Watchman left atrial appendage closure device, senile dementia and congestive heart failure.  She was seen by neurology and underwent an MRI of the cervical spine.  Suggestion were to wear c-collar at all times.  She also had post fall tendinitis and dizziness.  Neurology felt this may be possible postconcussive syndrome versus intrinsic ear issue.  Exam negative for palatal muscle myoclonus per the records.  Recommended follow-up with ENT if tinnitus persists.  She also had some associated nausea.  Left humeral fracture.  She was seen by orthopedics and felt it was nonoperative.  She was placed in a sling.  She continues on morphine and Tylenol for pain.  She did develop acute on chronic hypoxia.  She was placed on oxygen.  She continues on this at 2 L.  Chest x-ray showed atelectasis.    On today's visit patient is sitting up in bed.  She continues in cervical collar.  Pain control with morphine and Tylenol.  She continues on oxygen at 2 L.  Insulin lispro encouraged.  No production of phlegm.  She denies any chest pain.  No palpitations.  CHF appears compensated.  Blood pressure satisfactory.  Left upper extremity in sling.  Positive CMS.  She does have some bruising and swelling.  She reports that tinnitus  is improving.  She continues with occasional nausea.  She reports she had this at home.  She she has trialed medications in the past however they did not work.  Patient reports she is eating well.  She is having regular bowel movements and emptying her bladder.    Active Ambulatory Problems     Diagnosis Date Noted     Adrenal nodule (H) 11/08/2010     PAF (paroxysmal atrial fibrillation) (H) 09/22/2018     AK (actinic keratosis) 12/21/2011     Amnesia 12/14/2020     Blood typing encounter 03/20/2017     Bunion 01/25/2011     Cataracts, both eyes 10/19/2016     Chronic heart failure with preserved ejection fraction (H) 10/12/2019     Chronic back pain 10/29/2009     Chronic headache disorder 12/14/2020     Complications of medical care 10/29/2009     Depression with anxiety 12/14/2020     Diverticular disease of colon 02/16/2015     Diverticulitis 12/14/2020     Diverticulitis of colon 09/19/2010     Eczema 10/05/2010     Fibula fracture 04/27/2015     GERD (gastroesophageal reflux disease) 12/14/2020     History of colonic polyps 01/24/2011     Hypertension 07/28/2015     Insomnia 12/14/2020     Irritable bowel syndrome with diarrhea 10/14/2020     Knee pain, left 10/23/2015     Lactose intolerance 10/14/2020     Major depressive disorder, recurrent episode, mild (H) 11/28/2011     Malignant neoplasm of skin 11/11/2009     Migraine 10/29/2009     Mild cognitive impairment 10/23/2014     Toxic multinodular goiter 02/27/2015     Obesity 12/14/2020     Osteoarthrosis 10/29/2009     Primary osteoarthritis involving multiple joints 02/22/2017     Senile osteoporosis 04/15/2012     Sensorineural hearing loss, asymmetrical 11/23/2011     Spondylosis of lumbar region without myelopathy or radiculopathy 03/07/2017     Tibial plateau fracture 04/27/2015     Wrist fracture, left 10/28/2011     Neck pain 10/29/2009     Hemorrhage of rectum and anus 07/31/2019     Postoperative hypothyroidism 02/24/2021     Tachy-braydon syndrome  (H) 12/07/2021     EVERT on CPAP 02/24/2021     Irregular bowel habits 07/31/2019     Hypoxemia 02/24/2021     Hemorrhoids 01/30/2020     Concussion 01/13/2021     Chest pain 09/01/2021     Change in bowel habits 10/11/2022     B12 deficiency 04/11/2022     Senile dementia, uncomplicated (H) 10/06/2022     Presence of Watchman left atrial appendage closure device 10/06/2022     Closed nondisplaced fracture of fifth cervical vertebra (H) 10/06/2022     Closed fracture of proximal end of humerus 10/06/2022     Acute on chronic respiratory failure with hypoxemia (H) 10/06/2022     Resolved Ambulatory Problems     Diagnosis Date Noted     Pain in limb 05/02/2008     Other postprocedural status(V45.89) 05/02/2008     Ulcer of varicose vein of left leg (H) 01/30/2019     No Additional Past Medical History     Allergies   Allergen Reactions     Demerol [Meperidine] Unknown     Levofloxacin Unknown     Metronidazole Unknown       All Meds and Allergies reviewed in the record at the facility and is the most up-to-date.    Post Discharge Medication Reconciliation Status: discharge medications reconciled and changed, per note/orders  Current Outpatient Medications   Medication Sig     acetaminophen (TYLENOL) 500 MG tablet Take 500-1,000 mg by mouth every 6 hours as needed for mild pain     amLODIPine (NORVASC) 5 MG tablet Take 5 mg by mouth daily     aspirin (ASA) 325 MG EC tablet Take 1 tablet (325 mg) by mouth daily     buPROPion (WELLBUTRIN SR) 150 MG 12 hr tablet Take 1 tablet (150 mg) by mouth daily     Calcium-Magnesium-Vitamin D (CITRACAL SLOW RELEASE) 600- MG-MG-UNIT TB24 Take 600 mg by mouth     cholecalciferol 50 MCG (2000 UT) CAPS Take 2,000 Units by mouth     Cyanocobalamin 1000 MCG CAPS Take 1,000 mcg by mouth daily     donepezil (ARICEPT) 10 MG tablet      furosemide (LASIX) 20 MG tablet Take 20 mg by mouth every morning     irbesartan (AVAPRO) 150 MG tablet Take 1 tablet (150 mg) by mouth At Bedtime      levothyroxine (SYNTHROID/LEVOTHROID) 125 MCG tablet Take 1 tablet (125 mcg) by mouth daily     morphine (MS CONTIN) 15 MG CR tablet Take 2 tablets (30 mg) by mouth every 12 hours     nitroGLYcerin (NITROSTAT) 0.4 MG sublingual tablet Place 1 tablet (0.4 mg) under the tongue every 5 minutes as needed for chest pain For chest pain place 1 tablet under the tongue every 5 minutes for 3 doses. If symptoms persist 5 minutes after 1st dose call 911.     ondansetron (ZOFRAN) 4 MG tablet Take 1 tablet (4 mg) by mouth every 8 hours as needed for nausea     pantoprazole sodium (PROTONIX) 40 MG packet Take 1 packet (40 mg) by mouth daily     PARoxetine (PAXIL) 40 MG tablet Take 1 tablet (40 mg) by mouth every morning     sennosides (SENOKOT) 8.6 MG tablet Take 1 tablet by mouth 2 times daily     traZODone (DESYREL) 50 MG tablet Take 1 tablet (50 mg) by mouth At Bedtime     morphine (MSIR) 15 MG IR tablet Take 1 tablet (15 mg) by mouth every 6 hours as needed for severe pain     No current facility-administered medications for this visit.       REVIEW OF SYSTEMS:   Review of Systems  10 point review of systems reviewed.  Pertinent positives in HPI.    PHYSICAL EXAMINATION:  Physical Exam     Vital signs: /81   Pulse 79   Temp 97  F (36.1  C)   Resp 18   Wt 81.3 kg (179 lb 3.2 oz)   SpO2 94%   BMI 30.76 kg/m    General: Awake, Alert, oriented x3, sitting up in bed, appropriately, follows simple commands, conversant  HEENT:PERRLA, Pink conjunctiva, anicteric sclerae, dry oral mucosa  NECK: Continues in cervical collar.  CVS:  S1  S2, without murmur or gallop.   LUNG: Clear to auscultation, No wheezes, rales or rhonci.  Continues on O2 at 2 L.  BACK: No kyphosis of the thoracic spine  ABDOMEN: Soft, nontender to palpation, with positive bowel sounds  EXTREMITIES: Moves both upper and lower extremities, with limitation to the left upper extremity.  Sling in place.  Positive CMS.  Bruising and swelling noted.  No pedal  edema, no calf tenderness  SKIN: Warm and dry  NEUROLOGIC: Intact, pulses palpable  PSYCHIATRIC: Pleasant affect.      Labs:  All labs reviewed in the nursing home record and Epic   @  Lab Results   Component Value Date    WBC 3.8 10/13/2019    WBC 6.1 02/04/2008     Lab Results   Component Value Date    RBC 4.44 10/13/2019    RBC 4.21 02/04/2008     Lab Results   Component Value Date    HGB 13.4 10/13/2019    HGB 12.5 02/04/2008     Lab Results   Component Value Date    HCT 40.7 10/13/2019    HCT 37.4 02/04/2008     Lab Results   Component Value Date    MCV 92 10/13/2019    MCV 89 02/04/2008     Lab Results   Component Value Date    MCH 30.2 10/13/2019    MCH 29.7 02/04/2008     Lab Results   Component Value Date    MCHC 32.9 10/13/2019    MCHC 33.4 02/04/2008     Lab Results   Component Value Date    RDW 13.9 10/13/2019    RDW 13.2 02/04/2008     Lab Results   Component Value Date     10/13/2019     02/04/2008        @Last Comprehensive Metabolic Panel:  Sodium   Date Value Ref Range Status   10/14/2019 139 136 - 145 mmol/L Final     Potassium   Date Value Ref Range Status   10/15/2019 5.0 3.5 - 5.0 mmol/L Final     Chloride   Date Value Ref Range Status   10/14/2019 103 98 - 107 mmol/L Final     Carbon Dioxide (CO2)   Date Value Ref Range Status   10/14/2019 28 22 - 31 mmol/L Final     Anion Gap   Date Value Ref Range Status   10/14/2019 8 5 - 18 mmol/L Final     Glucose   Date Value Ref Range Status   10/14/2019 111 70 - 125 mg/dL Final     Urea Nitrogen   Date Value Ref Range Status   10/14/2019 12 8 - 28 mg/dL Final     Creatinine   Date Value Ref Range Status   10/14/2019 0.69 0.60 - 1.10 mg/dL Final     GFR Estimate   Date Value Ref Range Status   10/14/2019 >60 >60 mL/min/1.73m2 Final     Calcium   Date Value Ref Range Status   10/14/2019 10.0 8.5 - 10.5 mg/dL Final     Assessment/plan:    ICD-10-CM    1. Compression fracture of C5 vertebra with routine healing, subsequent encounter  S12.490D   Continue cervical collar.  Continue morphine and Tylenol for pain.  Attempt to wean off morphine as soon as possible secondary to hypoxia and confusion.      2. Closed fracture of proximal end of left humerus with routine healing, unspecified fracture morphology, subsequent encounter  S42.202D  Continue in sling.  Elevate.  Ice between therapies.      3. CHF (congestive heart failure), NYHA class I, acute, diastolic (H)  I50.31  Appears compensated.  Daily weights.      4. PAF (paroxysmal atrial fibrillation) (H)  I48.0  Previously on apixaban.  However not on this.  Continue aspirin.  Rate controlled.      5. Acute on chronic respiratory failure with hypoxemia (H)  J96.21  Patient was not on oxygen at home.  Encourage cough and deep breathe.  I-S every 4 hours while awake.  Attempt to wean off oxygen.        Okay for PT OT eval and treat.  Follow-up CBC and BMP.    35 minutes spent of which greater than 50% was face to face communication with the patient about pain management, weaning off oxygen and follow-ups.    This note has been dictated using voice recognition software. Any grammatical or context distortions are unintentional and inherent to the software    Electronically signed by: Marni May CNP           Sincerely,        Marni May, NP

## 2022-10-13 NOTE — PROGRESS NOTES
[facility]    Code Status:  FULL CODE   Visit Type:   Chief Complaint   Patient presents with     Hospital F/U     TCU Admission     Facility:  [facility] ([facility]) [[id number]]           Transitional Care Course: Mackenzie Brown is a 77 year old female who I am seeing today for admit to the TCU.  Patient recently hospitalized on 10/6/2022 at .  Patient had a fall sustaining a C5 vertebral compression fracture and a left humeral fracture.  Past medical history includes generalized anxiety disorder, hypertension, hypothyroidism, obstructive sleep apnea on CPAP, GERD, presence of Watchman left atrial appendage closure device, senile dementia and congestive heart failure.  She was seen by neurology and underwent an MRI of the cervical spine.  Suggestion were to wear c-collar at all times.  She also had post fall tendinitis and dizziness.  Neurology felt this may be possible postconcussive syndrome versus intrinsic ear issue.  Exam negative for palatal muscle myoclonus per the records.  Recommended follow-up with ENT if tinnitus persists.  She also had some associated nausea.  Left humeral fracture.  She was seen by orthopedics and felt it was nonoperative.  She was placed in a sling.  She continues on morphine and Tylenol for pain.  She did develop acute on chronic hypoxia.  She was placed on oxygen.  She continues on this at 2 L.  Chest x-ray showed atelectasis.    On today's visit patient is sitting up in bed.  She continues in cervical collar.  Pain control with morphine and Tylenol.  She continues on oxygen at 2 L.  Insulin lispro encouraged.  No production of phlegm.  She denies any chest pain.  No palpitations.  CHF appears compensated.  Blood pressure satisfactory.  Left upper extremity in sling.  Positive CMS.  She does have some bruising and swelling.  She reports that tinnitus is improving.  She continues with occasional nausea.  She reports she had this at home.  She she  has trialed medications in the past however they did not work.  Patient reports she is eating well.  She is having regular bowel movements and emptying her bladder.    Active Ambulatory Problems     Diagnosis Date Noted     Adrenal nodule (H) 11/08/2010     PAF (paroxysmal atrial fibrillation) (H) 09/22/2018     AK (actinic keratosis) 12/21/2011     Amnesia 12/14/2020     Blood typing encounter 03/20/2017     Bunion 01/25/2011     Cataracts, both eyes 10/19/2016     Chronic heart failure with preserved ejection fraction (H) 10/12/2019     Chronic back pain 10/29/2009     Chronic headache disorder 12/14/2020     Complications of medical care 10/29/2009     Depression with anxiety 12/14/2020     Diverticular disease of colon 02/16/2015     Diverticulitis 12/14/2020     Diverticulitis of colon 09/19/2010     Eczema 10/05/2010     Fibula fracture 04/27/2015     GERD (gastroesophageal reflux disease) 12/14/2020     History of colonic polyps 01/24/2011     Hypertension 07/28/2015     Insomnia 12/14/2020     Irritable bowel syndrome with diarrhea 10/14/2020     Knee pain, left 10/23/2015     Lactose intolerance 10/14/2020     Major depressive disorder, recurrent episode, mild (H) 11/28/2011     Malignant neoplasm of skin 11/11/2009     Migraine 10/29/2009     Mild cognitive impairment 10/23/2014     Toxic multinodular goiter 02/27/2015     Obesity 12/14/2020     Osteoarthrosis 10/29/2009     Primary osteoarthritis involving multiple joints 02/22/2017     Senile osteoporosis 04/15/2012     Sensorineural hearing loss, asymmetrical 11/23/2011     Spondylosis of lumbar region without myelopathy or radiculopathy 03/07/2017     Tibial plateau fracture 04/27/2015     Wrist fracture, left 10/28/2011     Neck pain 10/29/2009     Hemorrhage of rectum and anus 07/31/2019     Postoperative hypothyroidism 02/24/2021     Tachy-braydon syndrome (H) 12/07/2021     EVERT on CPAP 02/24/2021     Irregular bowel habits 07/31/2019     Hypoxemia  02/24/2021     Hemorrhoids 01/30/2020     Concussion 01/13/2021     Chest pain 09/01/2021     Change in bowel habits 10/11/2022     B12 deficiency 04/11/2022     Senile dementia, uncomplicated (H) 10/06/2022     Presence of Watchman left atrial appendage closure device 10/06/2022     Closed nondisplaced fracture of fifth cervical vertebra (H) 10/06/2022     Closed fracture of proximal end of humerus 10/06/2022     Acute on chronic respiratory failure with hypoxemia (H) 10/06/2022     Resolved Ambulatory Problems     Diagnosis Date Noted     Pain in limb 05/02/2008     Other postprocedural status(V45.89) 05/02/2008     Ulcer of varicose vein of left leg (H) 01/30/2019     No Additional Past Medical History     Allergies   Allergen Reactions     Demerol [Meperidine] Unknown     Levofloxacin Unknown     Metronidazole Unknown       All Meds and Allergies reviewed in the record at the facility and is the most up-to-date.    Post Discharge Medication Reconciliation Status: discharge medications reconciled and changed, per note/orders  Current Outpatient Medications   Medication Sig     acetaminophen (TYLENOL) 500 MG tablet Take 500-1,000 mg by mouth every 6 hours as needed for mild pain     amLODIPine (NORVASC) 5 MG tablet Take 5 mg by mouth daily     aspirin (ASA) 325 MG EC tablet Take 1 tablet (325 mg) by mouth daily     buPROPion (WELLBUTRIN SR) 150 MG 12 hr tablet Take 1 tablet (150 mg) by mouth daily     Calcium-Magnesium-Vitamin D (CITRACAL SLOW RELEASE) 600- MG-MG-UNIT TB24 Take 600 mg by mouth     cholecalciferol 50 MCG (2000 UT) CAPS Take 2,000 Units by mouth     Cyanocobalamin 1000 MCG CAPS Take 1,000 mcg by mouth daily     donepezil (ARICEPT) 10 MG tablet      furosemide (LASIX) 20 MG tablet Take 20 mg by mouth every morning     irbesartan (AVAPRO) 150 MG tablet Take 1 tablet (150 mg) by mouth At Bedtime     levothyroxine (SYNTHROID/LEVOTHROID) 125 MCG tablet Take 1 tablet (125 mcg) by mouth daily      morphine (MS CONTIN) 15 MG CR tablet Take 2 tablets (30 mg) by mouth every 12 hours     nitroGLYcerin (NITROSTAT) 0.4 MG sublingual tablet Place 1 tablet (0.4 mg) under the tongue every 5 minutes as needed for chest pain For chest pain place 1 tablet under the tongue every 5 minutes for 3 doses. If symptoms persist 5 minutes after 1st dose call 911.     ondansetron (ZOFRAN) 4 MG tablet Take 1 tablet (4 mg) by mouth every 8 hours as needed for nausea     pantoprazole sodium (PROTONIX) 40 MG packet Take 1 packet (40 mg) by mouth daily     PARoxetine (PAXIL) 40 MG tablet Take 1 tablet (40 mg) by mouth every morning     sennosides (SENOKOT) 8.6 MG tablet Take 1 tablet by mouth 2 times daily     traZODone (DESYREL) 50 MG tablet Take 1 tablet (50 mg) by mouth At Bedtime     morphine (MSIR) 15 MG IR tablet Take 1 tablet (15 mg) by mouth every 6 hours as needed for severe pain     No current facility-administered medications for this visit.       REVIEW OF SYSTEMS:   Review of Systems  10 point review of systems reviewed.  Pertinent positives in HPI.    PHYSICAL EXAMINATION:  Physical Exam     Vital signs: /81   Pulse 79   Temp 97  F (36.1  C)   Resp 18   Wt 81.3 kg (179 lb 3.2 oz)   SpO2 94%   BMI 30.76 kg/m    General: Awake, Alert, oriented x3, sitting up in bed, appropriately, follows simple commands, conversant  HEENT:PERRLA, Pink conjunctiva, anicteric sclerae, dry oral mucosa  NECK: Continues in cervical collar.  CVS:  S1  S2, without murmur or gallop.   LUNG: Clear to auscultation, No wheezes, rales or rhonci.  Continues on O2 at 2 L.  BACK: No kyphosis of the thoracic spine  ABDOMEN: Soft, nontender to palpation, with positive bowel sounds  EXTREMITIES: Moves both upper and lower extremities, with limitation to the left upper extremity.  Sling in place.  Positive CMS.  Bruising and swelling noted.  No pedal edema, no calf tenderness  SKIN: Warm and dry  NEUROLOGIC: Intact, pulses palpable  PSYCHIATRIC:  Pleasant affect.      Labs:  All labs reviewed in the nursing home record and Epic   @  Lab Results   Component Value Date    WBC 3.8 10/13/2019    WBC 6.1 02/04/2008     Lab Results   Component Value Date    RBC 4.44 10/13/2019    RBC 4.21 02/04/2008     Lab Results   Component Value Date    HGB 13.4 10/13/2019    HGB 12.5 02/04/2008     Lab Results   Component Value Date    HCT 40.7 10/13/2019    HCT 37.4 02/04/2008     Lab Results   Component Value Date    MCV 92 10/13/2019    MCV 89 02/04/2008     Lab Results   Component Value Date    MCH 30.2 10/13/2019    MCH 29.7 02/04/2008     Lab Results   Component Value Date    MCHC 32.9 10/13/2019    MCHC 33.4 02/04/2008     Lab Results   Component Value Date    RDW 13.9 10/13/2019    RDW 13.2 02/04/2008     Lab Results   Component Value Date     10/13/2019     02/04/2008        @Last Comprehensive Metabolic Panel:  Sodium   Date Value Ref Range Status   10/14/2019 139 136 - 145 mmol/L Final     Potassium   Date Value Ref Range Status   10/15/2019 5.0 3.5 - 5.0 mmol/L Final     Chloride   Date Value Ref Range Status   10/14/2019 103 98 - 107 mmol/L Final     Carbon Dioxide (CO2)   Date Value Ref Range Status   10/14/2019 28 22 - 31 mmol/L Final     Anion Gap   Date Value Ref Range Status   10/14/2019 8 5 - 18 mmol/L Final     Glucose   Date Value Ref Range Status   10/14/2019 111 70 - 125 mg/dL Final     Urea Nitrogen   Date Value Ref Range Status   10/14/2019 12 8 - 28 mg/dL Final     Creatinine   Date Value Ref Range Status   10/14/2019 0.69 0.60 - 1.10 mg/dL Final     GFR Estimate   Date Value Ref Range Status   10/14/2019 >60 >60 mL/min/1.73m2 Final     Calcium   Date Value Ref Range Status   10/14/2019 10.0 8.5 - 10.5 mg/dL Final     Assessment/plan:    ICD-10-CM    1. Compression fracture of C5 vertebra with routine healing, subsequent encounter  S12.490D  Continue cervical collar.  Continue morphine and Tylenol for pain.  Attempt to wean off morphine  as soon as possible secondary to hypoxia and confusion.      2. Closed fracture of proximal end of left humerus with routine healing, unspecified fracture morphology, subsequent encounter  S42.202D  Continue in sling.  Elevate.  Ice between therapies.      3. CHF (congestive heart failure), NYHA class I, acute, diastolic (H)  I50.31  Appears compensated.  Daily weights.      4. PAF (paroxysmal atrial fibrillation) (H)  I48.0  Previously on apixaban.  However not on this.  Continue aspirin.  Rate controlled.      5. Acute on chronic respiratory failure with hypoxemia (H)  J96.21  Patient was not on oxygen at home.  Encourage cough and deep breathe.  I-S every 4 hours while awake.  Attempt to wean off oxygen.        Okay for PT OT eval and treat.  Follow-up CBC and BMP.    35 minutes spent of which greater than 50% was face to face communication with the patient about pain management, weaning off oxygen and follow-ups.    This note has been dictated using voice recognition software. Any grammatical or context distortions are unintentional and inherent to the software    Electronically signed by: Marni May, CNP

## 2022-10-17 ENCOUNTER — TRANSITIONAL CARE UNIT VISIT (OUTPATIENT)
Dept: GERIATRICS | Facility: CLINIC | Age: 77
End: 2022-10-17
Payer: COMMERCIAL

## 2022-10-17 VITALS
SYSTOLIC BLOOD PRESSURE: 136 MMHG | TEMPERATURE: 97.8 F | RESPIRATION RATE: 16 BRPM | DIASTOLIC BLOOD PRESSURE: 79 MMHG | OXYGEN SATURATION: 92 % | WEIGHT: 179.8 LBS | HEIGHT: 64 IN | BODY MASS INDEX: 30.7 KG/M2 | HEART RATE: 64 BPM

## 2022-10-17 DIAGNOSIS — S42.202D CLOSED FRACTURE OF PROXIMAL END OF LEFT HUMERUS WITH ROUTINE HEALING, UNSPECIFIED FRACTURE MORPHOLOGY, SUBSEQUENT ENCOUNTER: ICD-10-CM

## 2022-10-17 DIAGNOSIS — F03.90 SENILE DEMENTIA, UNCOMPLICATED (H): ICD-10-CM

## 2022-10-17 DIAGNOSIS — G89.29 CHRONIC BACK PAIN: ICD-10-CM

## 2022-10-17 DIAGNOSIS — I50.31 CHF (CONGESTIVE HEART FAILURE), NYHA CLASS I, ACUTE, DIASTOLIC (H): ICD-10-CM

## 2022-10-17 DIAGNOSIS — I48.0 PAF (PAROXYSMAL ATRIAL FIBRILLATION) (H): ICD-10-CM

## 2022-10-17 DIAGNOSIS — S12.490D COMPRESSION FRACTURE OF C5 VERTEBRA WITH ROUTINE HEALING, SUBSEQUENT ENCOUNTER: Primary | ICD-10-CM

## 2022-10-17 DIAGNOSIS — M54.9 CHRONIC BACK PAIN: ICD-10-CM

## 2022-10-17 DIAGNOSIS — J96.21 ACUTE ON CHRONIC RESPIRATORY FAILURE WITH HYPOXEMIA (H): ICD-10-CM

## 2022-10-17 LAB
ANION GAP SERPL CALCULATED.3IONS-SCNC: 8 MMOL/L (ref 7–15)
BUN SERPL-MCNC: 7.5 MG/DL (ref 8–23)
CALCIUM SERPL-MCNC: 9 MG/DL (ref 8.8–10.2)
CHLORIDE SERPL-SCNC: 99 MMOL/L (ref 98–107)
CREAT SERPL-MCNC: 0.64 MG/DL (ref 0.51–0.95)
DEPRECATED HCO3 PLAS-SCNC: 36 MMOL/L (ref 22–29)
GFR SERPL CREATININE-BSD FRML MDRD: 89 ML/MIN/1.73M2
GLUCOSE SERPL-MCNC: 89 MG/DL (ref 70–99)
POTASSIUM SERPL-SCNC: 4.3 MMOL/L (ref 3.4–5.3)
SODIUM SERPL-SCNC: 143 MMOL/L (ref 136–145)

## 2022-10-17 PROCEDURE — P9604 ONE-WAY ALLOW PRORATED TRIP: HCPCS | Mod: ORL | Performed by: FAMILY MEDICINE

## 2022-10-17 PROCEDURE — 99309 SBSQ NF CARE MODERATE MDM 30: CPT | Performed by: NURSE PRACTITIONER

## 2022-10-17 PROCEDURE — 80048 BASIC METABOLIC PNL TOTAL CA: CPT | Mod: ORL | Performed by: FAMILY MEDICINE

## 2022-10-17 PROCEDURE — 36415 COLL VENOUS BLD VENIPUNCTURE: CPT | Mod: ORL | Performed by: FAMILY MEDICINE

## 2022-10-17 RX ORDER — MORPHINE SULFATE 15 MG/1
15 TABLET ORAL EVERY 6 HOURS PRN
Qty: 40 TABLET | Refills: 0 | Status: SHIPPED | OUTPATIENT
Start: 2022-10-17 | End: 2022-10-26

## 2022-10-17 NOTE — LETTER
10/17/2022        RE: Mackenzie Brown  [address]  [facility] [id number]        [facility]    Code Status:  FULL CODE   Visit Type:   Chief Complaint   Patient presents with     Nursing Home Acute     TCU Follow up     Facility:  [facility]) [[id number]]           Transitional Care Course: Mackenzie Brown is a 77 year old female who I am seeing today for follow-up ont TCU.  Patient recently hospitalized on 10/6/2022 at [facility].  Patient had a fall sustaining a C5 vertebral compression fracture and a left humeral fracture.  Past medical history includes generalized anxiety disorder, hypertension, hypothyroidism, obstructive sleep apnea on CPAP, GERD, presence of Watchman left atrial appendage closure device, senile dementia and congestive heart failure.  She was seen by neurology and underwent an MRI of the cervical spine.  Suggestion were to wear c-collar at all times.  She also had post fall tendinitis and dizziness.  Neurology felt this may be possible postconcussive syndrome versus intrinsic ear issue.  Exam negative for palatal muscle myoclonus per the records.  Recommended follow-up with ENT if tinnitus persists.  She also had some associated nausea.  Left humeral fracture.  She was seen by orthopedics and felt it was nonoperative.  She was placed in a sling.  She continues on morphine and Tylenol for pain.  She did develop acute on chronic hypoxia.  She was placed on oxygen.  She continues on this at 2 L.  Chest x-ray showed atelectasis.    On today's visit patient is sitting up in bed.  She has just returned from an MRI of her neck.  Recent C5 vertebral compression fracture.  She continues in cervical collar.  She continues on Tylenol and low-dose morphine for pain.  Today she reports pain in her neck improving.  Patient reports tinnitus is improving.  There is a note on the bedside table from her daughter in regards to possible need for follow-up with  ENT for the tinnitus.  We did discuss some vestibular training in therapy to assist.  We will also have her follow-up with ENT as requested.  She continues with some discomfort in her left upper extremity.  Recent humeral fracture.  She continues in sling.  Positive CMS.  Some bruising and swelling noted.  No further nausea.  She is eating well.  Patient continues to be dependent on oxygen at 2 L.  She denies any shortness of breath or chest pain.  However her sats drop in the low 90s with activity.  She does have underlying CHF.  She continues on Lasix.  Appears compensated.  X-ray from hospitalization showed atelectasis.  I-S is being encouraged.  No lower extremity edema.  Weight has been stable.  Hypertension.  Blood pressure satisfactory.  Today noted some underlying cognitive impairment.  Patient with poor recall of events.  She does have a history of senile dementia.    Active Ambulatory Problems     Diagnosis Date Noted     Adrenal nodule (H) 11/08/2010     PAF (paroxysmal atrial fibrillation) (H) 09/22/2018     AK (actinic keratosis) 12/21/2011     Amnesia 12/14/2020     Blood typing encounter 03/20/2017     Bunion 01/25/2011     Cataracts, both eyes 10/19/2016     Chronic heart failure with preserved ejection fraction (H) 10/12/2019     Chronic back pain 10/29/2009     Chronic headache disorder 12/14/2020     Complications of medical care 10/29/2009     Depression with anxiety 12/14/2020     Diverticular disease of colon 02/16/2015     Diverticulitis 12/14/2020     Diverticulitis of colon 09/19/2010     Eczema 10/05/2010     Fibula fracture 04/27/2015     GERD (gastroesophageal reflux disease) 12/14/2020     History of colonic polyps 01/24/2011     Hypertension 07/28/2015     Insomnia 12/14/2020     Irritable bowel syndrome with diarrhea 10/14/2020     Knee pain, left 10/23/2015     Lactose intolerance 10/14/2020     Major depressive disorder, recurrent episode, mild (H) 11/28/2011     Malignant neoplasm  of skin 11/11/2009     Migraine 10/29/2009     Mild cognitive impairment 10/23/2014     Toxic multinodular goiter 02/27/2015     Obesity 12/14/2020     Osteoarthrosis 10/29/2009     Primary osteoarthritis involving multiple joints 02/22/2017     Senile osteoporosis 04/15/2012     Sensorineural hearing loss, asymmetrical 11/23/2011     Spondylosis of lumbar region without myelopathy or radiculopathy 03/07/2017     Tibial plateau fracture 04/27/2015     Wrist fracture, left 10/28/2011     Neck pain 10/29/2009     Hemorrhage of rectum and anus 07/31/2019     Postoperative hypothyroidism 02/24/2021     Tachy-braydon syndrome (H) 12/07/2021     EVERT on CPAP 02/24/2021     Irregular bowel habits 07/31/2019     Hypoxemia 02/24/2021     Hemorrhoids 01/30/2020     Concussion 01/13/2021     Chest pain 09/01/2021     Change in bowel habits 10/11/2022     B12 deficiency 04/11/2022     Senile dementia, uncomplicated (H) 10/06/2022     Presence of Watchman left atrial appendage closure device 10/06/2022     Closed nondisplaced fracture of fifth cervical vertebra (H) 10/06/2022     Closed fracture of proximal end of humerus 10/06/2022     Acute on chronic respiratory failure with hypoxemia (H) 10/06/2022     Resolved Ambulatory Problems     Diagnosis Date Noted     Pain in limb 05/02/2008     Other postprocedural status(V45.89) 05/02/2008     Ulcer of varicose vein of left leg (H) 01/30/2019     No Additional Past Medical History     Allergies   Allergen Reactions     Demerol [Meperidine] Unknown     Levofloxacin Unknown     Metronidazole Unknown       All Meds and Allergies reviewed in the record at the facility and is the most up-to-date.      Current Outpatient Medications   Medication Sig     acetaminophen (TYLENOL) 500 MG tablet Take 500-1,000 mg by mouth every 6 hours as needed for mild pain     amLODIPine (NORVASC) 5 MG tablet Take 5 mg by mouth daily     aspirin (ASA) 325 MG EC tablet Take 1 tablet (325 mg) by mouth daily      "buPROPion (WELLBUTRIN SR) 150 MG 12 hr tablet Take 1 tablet (150 mg) by mouth daily     Calcium-Magnesium-Vitamin D (CITRACAL SLOW RELEASE) 600- MG-MG-UNIT TB24 Take 600 mg by mouth     cholecalciferol 50 MCG (2000 UT) CAPS Take 2,000 Units by mouth     Cyanocobalamin 1000 MCG CAPS Take 1,000 mcg by mouth daily     donepezil (ARICEPT) 10 MG tablet      furosemide (LASIX) 20 MG tablet Take 20 mg by mouth every morning     irbesartan (AVAPRO) 150 MG tablet Take 1 tablet (150 mg) by mouth At Bedtime     levothyroxine (SYNTHROID/LEVOTHROID) 125 MCG tablet Take 1 tablet (125 mcg) by mouth daily     morphine (MS CONTIN) 15 MG CR tablet Take 2 tablets (30 mg) by mouth every 12 hours     morphine (MSIR) 15 MG IR tablet Take 1 tablet (15 mg) by mouth every 6 hours as needed for severe pain     nitroGLYcerin (NITROSTAT) 0.4 MG sublingual tablet Place 1 tablet (0.4 mg) under the tongue every 5 minutes as needed for chest pain For chest pain place 1 tablet under the tongue every 5 minutes for 3 doses. If symptoms persist 5 minutes after 1st dose call 911.     ondansetron (ZOFRAN) 4 MG tablet Take 1 tablet (4 mg) by mouth every 8 hours as needed for nausea     pantoprazole sodium (PROTONIX) 40 MG packet Take 1 packet (40 mg) by mouth daily     PARoxetine (PAXIL) 40 MG tablet Take 1 tablet (40 mg) by mouth every morning     sennosides (SENOKOT) 8.6 MG tablet Take 1 tablet by mouth 2 times daily     traZODone (DESYREL) 50 MG tablet Take 1 tablet (50 mg) by mouth At Bedtime     No current facility-administered medications for this visit.       REVIEW OF SYSTEMS:   Review of Systems  10 point review of systems reviewed.  Pertinent positives in HPI.    PHYSICAL EXAMINATION:  Physical Exam     Vital signs: /79   Pulse 64   Temp 97.8  F (36.6  C)   Resp 16   Ht 1.626 m (5' 4\")   Wt 81.6 kg (179 lb 12.8 oz)   SpO2 92%   BMI 30.86 kg/m    General: Awake, Alert, oriented x3, sitting up in bed, appropriately, follows " simple commands, conversant  HEENT: Pink conjunctiva, anicteric sclerae, dry oral mucosa  NECK: Continues in cervical collar.  Sheepskin underneath.  CVS:  S1  S2, without murmur or gallop.   LUNG: Clear to auscultation, No wheezes, rales or rhonci.  Continues on O2 at 2 L.  No cough on exam.  BACK: No kyphosis of the thoracic spine  ABDOMEN: Soft,  with positive bowel sounds  EXTREMITIES: Moves both upper and lower extremities, with limitation to the left upper extremity.  Sling in place.  Positive CMS.  Bruising and swelling noted.  No pedal edema  SKIN: Warm and dry  NEUROLOGIC: Intact, pulses palpable  PSYCHIATRIC: Cognitive impairment noted with poor recall of events.      Labs:  All labs reviewed in the nursing home record and [facility]   @  Lab Results   Component Value Date    WBC 3.8 10/13/2019    WBC 6.1 02/04/2008     Lab Results   Component Value Date    RBC 4.44 10/13/2019    RBC 4.21 02/04/2008     Lab Results   Component Value Date    HGB 13.4 10/13/2019    HGB 12.5 02/04/2008     Lab Results   Component Value Date    HCT 40.7 10/13/2019    HCT 37.4 02/04/2008     Lab Results   Component Value Date    MCV 92 10/13/2019    MCV 89 02/04/2008     Lab Results   Component Value Date    MCH 30.2 10/13/2019    MCH 29.7 02/04/2008     Lab Results   Component Value Date    MCHC 32.9 10/13/2019    MCHC 33.4 02/04/2008     Lab Results   Component Value Date    RDW 13.9 10/13/2019    RDW 13.2 02/04/2008     Lab Results   Component Value Date     10/13/2019     02/04/2008        @Last Comprehensive Metabolic Panel:  Sodium   Date Value Ref Range Status   10/17/2022 143 136 - 145 mmol/L Final     Potassium   Date Value Ref Range Status   10/17/2022 4.3 3.4 - 5.3 mmol/L Final   10/15/2019 5.0 3.5 - 5.0 mmol/L Final     Chloride   Date Value Ref Range Status   10/17/2022 99 98 - 107 mmol/L Final   10/14/2019 103 98 - 107 mmol/L Final     Carbon Dioxide (CO2)   Date Value Ref Range Status   10/17/2022 36 (H)  22 - 29 mmol/L Final   10/14/2019 28 22 - 31 mmol/L Final     Anion Gap   Date Value Ref Range Status   10/17/2022 8 7 - 15 mmol/L Final   10/14/2019 8 5 - 18 mmol/L Final     Glucose   Date Value Ref Range Status   10/17/2022 89 70 - 99 mg/dL Final   10/14/2019 111 70 - 125 mg/dL Final     Urea Nitrogen   Date Value Ref Range Status   10/17/2022 7.5 (L) 8.0 - 23.0 mg/dL Final   10/14/2019 12 8 - 28 mg/dL Final     Creatinine   Date Value Ref Range Status   10/17/2022 0.64 0.51 - 0.95 mg/dL Final     GFR Estimate   Date Value Ref Range Status   10/17/2022 89 >60 mL/min/1.73m2 Final     Comment:     Effective December 21, 2021 eGFRcr in adults is calculated using the 2021 CKD-EPI creatinine equation which includes age and gender (Kacy arechiga al., [place], DOI: 10.1056/DRJHrv8403417)   10/14/2019 >60 >60 mL/min/1.73m2 Final     Calcium   Date Value Ref Range Status   10/17/2022 9.0 8.8 - 10.2 mg/dL Final     Assessment/plan:      ICD-10-CM    1. Compression fracture of C5 vertebra with routine healing, subsequent encounter  S12.490D  Patient went for an MRI of her neck today.  She has a follow-up with [facility] on 10/20.  Continue Tylenol and low-dose morphine for pain.  Attempt to wean off morphine given underlying cognitive impairment.  Post fall tinnitus.  Patient reporting it is improved however family concerned.  We will attempt vestibular retraining and therapy and follow-up with ENT.      2. Closed fracture of proximal end of left humerus with routine healing, unspecified fracture morphology, subsequent encounter  S42.202D  Continues in a sling.  She has a follow-up with Ortho on 11/3.      3. Senile dementia, uncomplicated (H)  F03.90  Recognize poor recall of events today.  Otherwise stable.      4. CHF (congestive heart failure), NYHA class I, acute, diastolic (H)  I50.31 Appears compensated with 20 mg of Lasix daily.  Weight stable.  No lower extremity edema.        5. Acute on chronic respiratory  failure with hypoxemia (H)  J96.21 Continues to be on oxygen at 2 L.  Attempting to wean off.  I-S encouraged every 4 hours while awake.  Out of bed for meals encouraged.      6. PAF (paroxysmal atrial fibrillation) (H)  I48.0  Rate controlled.  No longer on anticoagulation given her falls.          This note has been dictated using voice recognition software. Any grammatical or context distortions are unintentional and inherent to the software    Electronically signed by: Marni May CNP           Sincerely,        Marni May NP

## 2022-10-18 NOTE — PROGRESS NOTES
[facility] GERIATRIC SERVICES    Code Status:  FULL CODE   Visit Type:   Chief Complaint   Patient presents with     Nursing Home Acute     TCU Follow up     Facility:  [facility] ([facility]) [[id number]]           Transitional Care Course: Mackenzie Brown is a 77 year old female who I am seeing today for follow-up ont TCU.  Patient recently hospitalized on 10/6/2022 at [facility].  Patient had a fall sustaining a C5 vertebral compression fracture and a left humeral fracture.  Past medical history includes generalized anxiety disorder, hypertension, hypothyroidism, obstructive sleep apnea on CPAP, GERD, presence of Watchman left atrial appendage closure device, senile dementia and congestive heart failure.  She was seen by neurology and underwent an MRI of the cervical spine.  Suggestion were to wear c-collar at all times.  She also had post fall tendinitis and dizziness.  Neurology felt this may be possible postconcussive syndrome versus intrinsic ear issue.  Exam negative for palatal muscle myoclonus per the records.  Recommended follow-up with ENT if tinnitus persists.  She also had some associated nausea.  Left humeral fracture.  She was seen by orthopedics and felt it was nonoperative.  She was placed in a sling.  She continues on morphine and Tylenol for pain.  She did develop acute on chronic hypoxia.  She was placed on oxygen.  She continues on this at 2 L.  Chest x-ray showed atelectasis.    On today's visit patient is sitting up in bed.  She has just returned from an MRI of her neck.  Recent C5 vertebral compression fracture.  She continues in cervical collar.  She continues on Tylenol and low-dose morphine for pain.  Today she reports pain in her neck improving.  Patient reports tinnitus is improving.  There is a note on the bedside table from her daughter in regards to possible need for follow-up with ENT for the tinnitus.  We did discuss some vestibular training in therapy to assist.  We will  also have her follow-up with ENT as requested.  She continues with some discomfort in her left upper extremity.  Recent humeral fracture.  She continues in sling.  Positive CMS.  Some bruising and swelling noted.  No further nausea.  She is eating well.  Patient continues to be dependent on oxygen at 2 L.  She denies any shortness of breath or chest pain.  However her sats drop in the low 90s with activity.  She does have underlying CHF.  She continues on Lasix.  Appears compensated.  X-ray from hospitalization showed atelectasis.  I-S is being encouraged.  No lower extremity edema.  Weight has been stable.  Hypertension.  Blood pressure satisfactory.  Today noted some underlying cognitive impairment.  Patient with poor recall of events.  She does have a history of senile dementia.    Active Ambulatory Problems     Diagnosis Date Noted     Adrenal nodule (H) 11/08/2010     PAF (paroxysmal atrial fibrillation) (H) 09/22/2018     AK (actinic keratosis) 12/21/2011     Amnesia 12/14/2020     Blood typing encounter 03/20/2017     Bunion 01/25/2011     Cataracts, both eyes 10/19/2016     Chronic heart failure with preserved ejection fraction (H) 10/12/2019     Chronic back pain 10/29/2009     Chronic headache disorder 12/14/2020     Complications of medical care 10/29/2009     Depression with anxiety 12/14/2020     Diverticular disease of colon 02/16/2015     Diverticulitis 12/14/2020     Diverticulitis of colon 09/19/2010     Eczema 10/05/2010     Fibula fracture 04/27/2015     GERD (gastroesophageal reflux disease) 12/14/2020     History of colonic polyps 01/24/2011     Hypertension 07/28/2015     Insomnia 12/14/2020     Irritable bowel syndrome with diarrhea 10/14/2020     Knee pain, left 10/23/2015     Lactose intolerance 10/14/2020     Major depressive disorder, recurrent episode, mild (H) 11/28/2011     Malignant neoplasm of skin 11/11/2009     Migraine 10/29/2009     Mild cognitive impairment 10/23/2014     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     Obesity 12/14/2020     Osteoarthrosis 10/29/2009     Primary osteoarthritis involving multiple joints 02/22/2017     Senile osteoporosis 04/15/2012     Sensorineural hearing loss, asymmetrical 11/23/2011     Spondylosis of lumbar region without myelopathy or radiculopathy 03/07/2017     Tibial plateau fracture 04/27/2015     Wrist fracture, left 10/28/2011     Neck pain 10/29/2009     Hemorrhage of rectum and anus 07/31/2019     Postoperative hypothyroidism 02/24/2021     Tachy-braydon syndrome (H) 12/07/2021     EVERT on CPAP 02/24/2021     Irregular bowel habits 07/31/2019     Hypoxemia 02/24/2021     Hemorrhoids 01/30/2020     Concussion 01/13/2021     Chest pain 09/01/2021     Change in bowel habits 10/11/2022     B12 deficiency 04/11/2022     Senile dementia, uncomplicated (H) 10/06/2022     Presence of Watchman left atrial appendage closure device 10/06/2022     Closed nondisplaced fracture of fifth cervical vertebra (H) 10/06/2022     Closed fracture of proximal end of humerus 10/06/2022     Acute on chronic respiratory failure with hypoxemia (H) 10/06/2022     Resolved Ambulatory Problems     Diagnosis Date Noted     Pain in limb 05/02/2008     Other postprocedural status(V45.89) 05/02/2008     Ulcer of varicose vein of left leg (H) 01/30/2019     No Additional Past Medical History     Allergies   Allergen Reactions     Demerol [Meperidine] Unknown     Levofloxacin Unknown     Metronidazole Unknown       All Meds and Allergies reviewed in the record at the facility and is the most up-to-date.      Current Outpatient Medications   Medication Sig     acetaminophen (TYLENOL) 500 MG tablet Take 500-1,000 mg by mouth every 6 hours as needed for mild pain     amLODIPine (NORVASC) 5 MG tablet Take 5 mg by mouth daily     aspirin (ASA) 325 MG EC tablet Take 1 tablet (325 mg) by mouth daily     buPROPion (WELLBUTRIN SR) 150 MG 12 hr tablet Take 1 tablet (150 mg) by mouth daily      "Calcium-Magnesium-Vitamin D (CITRACAL SLOW RELEASE) 600- MG-MG-UNIT TB24 Take 600 mg by mouth     cholecalciferol 50 MCG (2000 UT) CAPS Take 2,000 Units by mouth     Cyanocobalamin 1000 MCG CAPS Take 1,000 mcg by mouth daily     donepezil (ARICEPT) 10 MG tablet      furosemide (LASIX) 20 MG tablet Take 20 mg by mouth every morning     irbesartan (AVAPRO) 150 MG tablet Take 1 tablet (150 mg) by mouth At Bedtime     levothyroxine (SYNTHROID/LEVOTHROID) 125 MCG tablet Take 1 tablet (125 mcg) by mouth daily     morphine (MS CONTIN) 15 MG CR tablet Take 2 tablets (30 mg) by mouth every 12 hours     morphine (MSIR) 15 MG IR tablet Take 1 tablet (15 mg) by mouth every 6 hours as needed for severe pain     nitroGLYcerin (NITROSTAT) 0.4 MG sublingual tablet Place 1 tablet (0.4 mg) under the tongue every 5 minutes as needed for chest pain For chest pain place 1 tablet under the tongue every 5 minutes for 3 doses. If symptoms persist 5 minutes after 1st dose call 911.     ondansetron (ZOFRAN) 4 MG tablet Take 1 tablet (4 mg) by mouth every 8 hours as needed for nausea     pantoprazole sodium (PROTONIX) 40 MG packet Take 1 packet (40 mg) by mouth daily     PARoxetine (PAXIL) 40 MG tablet Take 1 tablet (40 mg) by mouth every morning     sennosides (SENOKOT) 8.6 MG tablet Take 1 tablet by mouth 2 times daily     traZODone (DESYREL) 50 MG tablet Take 1 tablet (50 mg) by mouth At Bedtime     No current facility-administered medications for this visit.       REVIEW OF SYSTEMS:   Review of Systems  10 point review of systems reviewed.  Pertinent positives in HPI.    PHYSICAL EXAMINATION:  Physical Exam     Vital signs: /79   Pulse 64   Temp 97.8  F (36.6  C)   Resp 16   Ht 1.626 m (5' 4\")   Wt 81.6 kg (179 lb 12.8 oz)   SpO2 92%   BMI 30.86 kg/m    General: Awake, Alert, oriented x3, sitting up in bed, appropriately, follows simple commands, conversant  HEENT: Pink conjunctiva, anicteric sclerae, dry oral " mucosa  NECK: Continues in cervical collar.  Sheepskin underneath.  CVS:  S1  S2, without murmur or gallop.   LUNG: Clear to auscultation, No wheezes, rales or rhonci.  Continues on O2 at 2 L.  No cough on exam.  BACK: No kyphosis of the thoracic spine  ABDOMEN: Soft,  with positive bowel sounds  EXTREMITIES: Moves both upper and lower extremities, with limitation to the left upper extremity.  Sling in place.  Positive CMS.  Bruising and swelling noted.  No pedal edema  SKIN: Warm and dry  NEUROLOGIC: Intact, pulses palpable  PSYCHIATRIC: Cognitive impairment noted with poor recall of events.      Labs:  All labs reviewed in the nursing home record and Epic   @  Lab Results   Component Value Date    WBC 3.8 10/13/2019    WBC 6.1 02/04/2008     Lab Results   Component Value Date    RBC 4.44 10/13/2019    RBC 4.21 02/04/2008     Lab Results   Component Value Date    HGB 13.4 10/13/2019    HGB 12.5 02/04/2008     Lab Results   Component Value Date    HCT 40.7 10/13/2019    HCT 37.4 02/04/2008     Lab Results   Component Value Date    MCV 92 10/13/2019    MCV 89 02/04/2008     Lab Results   Component Value Date    MCH 30.2 10/13/2019    MCH 29.7 02/04/2008     Lab Results   Component Value Date    MCHC 32.9 10/13/2019    MCHC 33.4 02/04/2008     Lab Results   Component Value Date    RDW 13.9 10/13/2019    RDW 13.2 02/04/2008     Lab Results   Component Value Date     10/13/2019     02/04/2008        @Last Comprehensive Metabolic Panel:  Sodium   Date Value Ref Range Status   10/17/2022 143 136 - 145 mmol/L Final     Potassium   Date Value Ref Range Status   10/17/2022 4.3 3.4 - 5.3 mmol/L Final   10/15/2019 5.0 3.5 - 5.0 mmol/L Final     Chloride   Date Value Ref Range Status   10/17/2022 99 98 - 107 mmol/L Final   10/14/2019 103 98 - 107 mmol/L Final     Carbon Dioxide (CO2)   Date Value Ref Range Status   10/17/2022 36 (H) 22 - 29 mmol/L Final   10/14/2019 28 22 - 31 mmol/L Final     Anion Gap   Date  Value Ref Range Status   10/17/2022 8 7 - 15 mmol/L Final   10/14/2019 8 5 - 18 mmol/L Final     Glucose   Date Value Ref Range Status   10/17/2022 89 70 - 99 mg/dL Final   10/14/2019 111 70 - 125 mg/dL Final     Urea Nitrogen   Date Value Ref Range Status   10/17/2022 7.5 (L) 8.0 - 23.0 mg/dL Final   10/14/2019 12 8 - 28 mg/dL Final     Creatinine   Date Value Ref Range Status   10/17/2022 0.64 0.51 - 0.95 mg/dL Final     GFR Estimate   Date Value Ref Range Status   10/17/2022 89 >60 mL/min/1.73m2 Final     Comment:     Effective December 21, 2021 eGFRcr in adults is calculated using the 2021 CKD-EPI creatinine equation which includes age and gender (Kacy et al., NE, DOI: 10.1056/GABNko2738274)   10/14/2019 >60 >60 mL/min/1.73m2 Final     Calcium   Date Value Ref Range Status   10/17/2022 9.0 8.8 - 10.2 mg/dL Final     Assessment/plan:      ICD-10-CM    1. Compression fracture of C5 vertebra with routine healing, subsequent encounter  S12.490D  Patient went for an MRI of her neck today.  She has a follow-up with [facility] on 10/20.  Continue Tylenol and low-dose morphine for pain.  Attempt to wean off morphine given underlying cognitive impairment.  Post fall tinnitus.  Patient reporting it is improved however family concerned.  We will attempt vestibular retraining and therapy and follow-up with ENT.      2. Closed fracture of proximal end of left humerus with routine healing, unspecified fracture morphology, subsequent encounter  S42.202D  Continues in a sling.  She has a follow-up with Ortho on 11/3.      3. Senile dementia, uncomplicated (H)  F03.90  Recognize poor recall of events today.  Otherwise stable.      4. CHF (congestive heart failure), NYHA class I, acute, diastolic (H)  I50.31 Appears compensated with 20 mg of Lasix daily.  Weight stable.  No lower extremity edema.        5. Acute on chronic respiratory failure with hypoxemia (H)  J96.21 Continues to be on oxygen at 2 L.  Attempting  to wean off.  I-S encouraged every 4 hours while awake.  Out of bed for meals encouraged.      6. PAF (paroxysmal atrial fibrillation) (H)  I48.0  Rate controlled.  No longer on anticoagulation given her falls.          This note has been dictated using voice recognition software. Any grammatical or context distortions are unintentional and inherent to the software    Electronically signed by: Marni May CNP

## 2022-10-19 ENCOUNTER — TRANSITIONAL CARE UNIT VISIT (OUTPATIENT)
Dept: GERIATRICS | Facility: CLINIC | Age: 77
End: 2022-10-19
Payer: COMMERCIAL

## 2022-10-19 VITALS
SYSTOLIC BLOOD PRESSURE: 145 MMHG | TEMPERATURE: 97.8 F | HEIGHT: 64 IN | OXYGEN SATURATION: 93 % | DIASTOLIC BLOOD PRESSURE: 73 MMHG | WEIGHT: 175.6 LBS | BODY MASS INDEX: 29.98 KG/M2 | RESPIRATION RATE: 20 BRPM | HEART RATE: 74 BPM

## 2022-10-19 DIAGNOSIS — J96.21 ACUTE ON CHRONIC RESPIRATORY FAILURE WITH HYPOXEMIA (H): ICD-10-CM

## 2022-10-19 DIAGNOSIS — S42.202D CLOSED FRACTURE OF PROXIMAL END OF LEFT HUMERUS WITH ROUTINE HEALING, UNSPECIFIED FRACTURE MORPHOLOGY, SUBSEQUENT ENCOUNTER: ICD-10-CM

## 2022-10-19 DIAGNOSIS — I48.0 PAF (PAROXYSMAL ATRIAL FIBRILLATION) (H): ICD-10-CM

## 2022-10-19 DIAGNOSIS — S12.490D COMPRESSION FRACTURE OF C5 VERTEBRA WITH ROUTINE HEALING, SUBSEQUENT ENCOUNTER: Primary | ICD-10-CM

## 2022-10-19 DIAGNOSIS — I50.31 CHF (CONGESTIVE HEART FAILURE), NYHA CLASS I, ACUTE, DIASTOLIC (H): ICD-10-CM

## 2022-10-19 PROCEDURE — 99309 SBSQ NF CARE MODERATE MDM 30: CPT | Performed by: NURSE PRACTITIONER

## 2022-10-19 NOTE — LETTER
10/19/2022        RE: Mackenzie Brown  [address]  [facility] [id number]        [facility]    Code Status:  FULL CODE   Visit Type:   Chief Complaint   Patient presents with     Nursing Home Acute     TCU Follow up     Facility:  [facility] ([facility]) [[id number]]           Transitional Care Course: Mackenzie Brown is a 77 year old female who I am seeing today for follow-up ont TCU.  Patient recently hospitalized on 10/6/2022 at [facility].  Patient had a fall sustaining a C5 vertebral compression fracture and a left humeral fracture.  Past medical history includes generalized anxiety disorder, hypertension, hypothyroidism, obstructive sleep apnea on CPAP, GERD, presence of Watchman left atrial appendage closure device, senile dementia and congestive heart failure.  She was seen by neurology and underwent an MRI of the cervical spine.  Suggestion were to wear c-collar at all times.  She also had post fall tendinitis and dizziness.  Neurology felt this may be possible postconcussive syndrome versus intrinsic ear issue.  Exam negative for palatal muscle myoclonus per the records.  Recommended follow-up with ENT if tinnitus persists.  She also had some associated nausea.  Left humeral fracture.  She was seen by orthopedics and felt it was nonoperative.  She was placed in a sling.  She continues on morphine and Tylenol for pain.  She did develop acute on chronic hypoxia.  She was placed on oxygen.  She continues on this at 2 L.  Chest x-ray showed atelectasis.    On today's visit patient is sitting up in bed.  Denies prior patient underwent MRI of her neck.  Recent C5 vertebral compression fracture.  She continues in cervical collar.  She has a follow-up with spine in the a.m.  She continues on Tylenol and low-dose morphine for pain.  Scheduled morphine discontinued.  She continues to have as needed morphine.  She reports some increased pain during the night and felt her pain  got away from her.  Today this is improved.  Post fall tendinitis.  She also has a follow-up with ENT.  I have suggested vestibular training.  She will start this with therapy next week.  Humeral fracture.  She continues in splinting.  Positive CMS.  No further nausea.  Patient admitted on oxygen at 2 L.  Today she has been weaned off.  She does have underlying CHF.  Denies any increased shortness of breath.  No chest pain.  O2 sats have been greater than 90% on room air.  Weight stable.  Hypertension.  Blood pressure satisfactory.      Active Ambulatory Problems     Diagnosis Date Noted     Adrenal nodule (H) 11/08/2010     PAF (paroxysmal atrial fibrillation) (H) 09/22/2018     AK (actinic keratosis) 12/21/2011     Amnesia 12/14/2020     Blood typing encounter 03/20/2017     Bunion 01/25/2011     Cataracts, both eyes 10/19/2016     Chronic heart failure with preserved ejection fraction (H) 10/12/2019     Chronic back pain 10/29/2009     Chronic headache disorder 12/14/2020     Complications of medical care 10/29/2009     Depression with anxiety 12/14/2020     Diverticular disease of colon 02/16/2015     Diverticulitis 12/14/2020     Diverticulitis of colon 09/19/2010     Eczema 10/05/2010     Fibula fracture 04/27/2015     GERD (gastroesophageal reflux disease) 12/14/2020     History of colonic polyps 01/24/2011     Hypertension 07/28/2015     Insomnia 12/14/2020     Irritable bowel syndrome with diarrhea 10/14/2020     Knee pain, left 10/23/2015     Lactose intolerance 10/14/2020     Major depressive disorder, recurrent episode, mild (H) 11/28/2011     Malignant neoplasm of skin 11/11/2009     Migraine 10/29/2009     Mild cognitive impairment 10/23/2014     Toxic multinodular goiter 02/27/2015     Obesity 12/14/2020     Osteoarthrosis 10/29/2009     Primary osteoarthritis involving multiple joints 02/22/2017     Senile osteoporosis 04/15/2012     Sensorineural hearing loss, asymmetrical 11/23/2011     Spondylosis  of lumbar region without myelopathy or radiculopathy 03/07/2017     Tibial plateau fracture 04/27/2015     Wrist fracture, left 10/28/2011     Neck pain 10/29/2009     Hemorrhage of rectum and anus 07/31/2019     Postoperative hypothyroidism 02/24/2021     Tachy-braydon syndrome (H) 12/07/2021     EVERT on CPAP 02/24/2021     Irregular bowel habits 07/31/2019     Hypoxemia 02/24/2021     Hemorrhoids 01/30/2020     Concussion 01/13/2021     Chest pain 09/01/2021     Change in bowel habits 10/11/2022     B12 deficiency 04/11/2022     Senile dementia, uncomplicated (H) 10/06/2022     Presence of Watchman left atrial appendage closure device 10/06/2022     Closed nondisplaced fracture of fifth cervical vertebra (H) 10/06/2022     Closed fracture of proximal end of humerus 10/06/2022     Acute on chronic respiratory failure with hypoxemia (H) 10/06/2022     Resolved Ambulatory Problems     Diagnosis Date Noted     Pain in limb 05/02/2008     Other postprocedural status(V45.89) 05/02/2008     Ulcer of varicose vein of left leg (H) 01/30/2019     No Additional Past Medical History     Allergies   Allergen Reactions     Demerol [Meperidine] Unknown     Levofloxacin Unknown     Metronidazole Unknown       All Meds and Allergies reviewed in the record at the facility and is the most up-to-date.      Current Outpatient Medications   Medication Sig     acetaminophen (TYLENOL) 500 MG tablet Take 1,000 mg by mouth 4 times daily     amLODIPine (NORVASC) 5 MG tablet Take 5 mg by mouth daily     aspirin (ASA) 325 MG EC tablet Take 1 tablet (325 mg) by mouth daily     buPROPion (WELLBUTRIN SR) 150 MG 12 hr tablet Take 1 tablet (150 mg) by mouth daily     Calcium-Magnesium-Vitamin D (CITRACAL SLOW RELEASE) 600- MG-MG-UNIT TB24 Take 600 mg by mouth     cholecalciferol 50 MCG (2000 UT) CAPS Take 2,000 Units by mouth     Cyanocobalamin 1000 MCG CAPS Take 1,000 mcg by mouth daily     donepezil (ARICEPT) 10 MG tablet      furosemide  "(LASIX) 20 MG tablet Take 20 mg by mouth every morning     irbesartan (AVAPRO) 150 MG tablet Take 1 tablet (150 mg) by mouth At Bedtime     levothyroxine (SYNTHROID/LEVOTHROID) 125 MCG tablet Take 1 tablet (125 mcg) by mouth daily     morphine (MSIR) 15 MG IR tablet Take 1 tablet (15 mg) by mouth every 6 hours as needed for severe pain     nitroGLYcerin (NITROSTAT) 0.4 MG sublingual tablet Place 1 tablet (0.4 mg) under the tongue every 5 minutes as needed for chest pain For chest pain place 1 tablet under the tongue every 5 minutes for 3 doses. If symptoms persist 5 minutes after 1st dose call 911.     ondansetron (ZOFRAN) 4 MG tablet Take 1 tablet (4 mg) by mouth every 8 hours as needed for nausea     pantoprazole sodium (PROTONIX) 40 MG packet Take 1 packet (40 mg) by mouth daily     PARoxetine (PAXIL) 40 MG tablet Take 1 tablet (40 mg) by mouth every morning     sennosides (SENOKOT) 8.6 MG tablet Take 1 tablet by mouth 2 times daily     traZODone (DESYREL) 50 MG tablet Take 1 tablet (50 mg) by mouth At Bedtime     No current facility-administered medications for this visit.       REVIEW OF SYSTEMS:   Review of Systems  10 point review of systems reviewed.  Pertinent positives in HPI.    PHYSICAL EXAMINATION:  Physical Exam     Vital signs: BP (!) 145/73   Pulse 74   Temp 97.8  F (36.6  C)   Resp 20   Ht 1.626 m (5' 4\")   Wt 79.7 kg (175 lb 9.6 oz)   SpO2 93%   BMI 30.14 kg/m    General: Awake, Alert, oriented x3, sitting up in bed, appropriately, follows simple commands, conversant  HEENT: Pink conjunctiva, anicteric sclerae, dry oral mucosa  NECK: Continues in cervical collar.  Sheepskin underneath.  CVS:  S1  S2, without murmur or gallop.   LUNG: Clear to auscultation, No wheezes, rales or rhonci.  Now off oxygen.  ABDOMEN: Soft,  with positive bowel sounds  EXTREMITIES: Moves both upper and lower extremities, with limitation to the left upper extremity.  Sling in place.  Positive CMS.  Bruising and " swelling noted.  No pedal edema  SKIN: Warm and dry  NEUROLOGIC: Intact, pulses palpable  PSYCHIATRIC: Pleasant affect.      Labs:  All labs reviewed in the nursing home record and Epic   @  Lab Results   Component Value Date    WBC 3.8 10/13/2019    WBC 6.1 02/04/2008     Lab Results   Component Value Date    RBC 4.44 10/13/2019    RBC 4.21 02/04/2008     Lab Results   Component Value Date    HGB 13.4 10/13/2019    HGB 12.5 02/04/2008     Lab Results   Component Value Date    HCT 40.7 10/13/2019    HCT 37.4 02/04/2008     Lab Results   Component Value Date    MCV 92 10/13/2019    MCV 89 02/04/2008     Lab Results   Component Value Date    MCH 30.2 10/13/2019    MCH 29.7 02/04/2008     Lab Results   Component Value Date    MCHC 32.9 10/13/2019    MCHC 33.4 02/04/2008     Lab Results   Component Value Date    RDW 13.9 10/13/2019    RDW 13.2 02/04/2008     Lab Results   Component Value Date     10/13/2019     02/04/2008        @Last Comprehensive Metabolic Panel:  Sodium   Date Value Ref Range Status   10/17/2022 143 136 - 145 mmol/L Final     Potassium   Date Value Ref Range Status   10/17/2022 4.3 3.4 - 5.3 mmol/L Final   10/15/2019 5.0 3.5 - 5.0 mmol/L Final     Chloride   Date Value Ref Range Status   10/17/2022 99 98 - 107 mmol/L Final   10/14/2019 103 98 - 107 mmol/L Final     Carbon Dioxide (CO2)   Date Value Ref Range Status   10/17/2022 36 (H) 22 - 29 mmol/L Final   10/14/2019 28 22 - 31 mmol/L Final     Anion Gap   Date Value Ref Range Status   10/17/2022 8 7 - 15 mmol/L Final   10/14/2019 8 5 - 18 mmol/L Final     Glucose   Date Value Ref Range Status   10/17/2022 89 70 - 99 mg/dL Final   10/14/2019 111 70 - 125 mg/dL Final     Urea Nitrogen   Date Value Ref Range Status   10/17/2022 7.5 (L) 8.0 - 23.0 mg/dL Final   10/14/2019 12 8 - 28 mg/dL Final     Creatinine   Date Value Ref Range Status   10/17/2022 0.64 0.51 - 0.95 mg/dL Final     GFR Estimate   Date Value Ref Range Status   10/17/2022  89 >60 mL/min/1.73m2 Final     Comment:     Effective December 21, 2021 eGFRcr in adults is calculated using the 2021 CKD-EPI creatinine equation which includes age and gender (Kacy et al., NE, DOI: 10.1056/[id number])   10/14/2019 >60 >60 mL/min/1.73m2 Final     Calcium   Date Value Ref Range Status   10/17/2022 9.0 8.8 - 10.2 mg/dL Final     Assessment/plan: Reviewed with changes.      ICD-10-CM    1. Compression fracture of C5 vertebra with routine healing, subsequent encounter  S12.490D  Patient went for an MRI of her neck today.  She has a follow-up with [facility] on 10/20.  Continue Tylenol and low-dose morphine for pain.  Scheduled morphine discontinued.  Continue as needed.    Post fall tinnitus.  Follow-up with ENT in the a.m.   We will attempt vestibular retraining and therapy at the facility.      2. Closed fracture of proximal end of left humerus with routine healing, unspecified fracture morphology, subsequent encounter  S42.202D  Continues in a sling.  She has a follow-up with Ortho on 11/3.         3. CHF (congestive heart failure), NYHA class I, acute, diastolic (H)  I50.31 Appears compensated with 20 mg of Lasix daily.  Weight stable.  No lower extremity edema.        4. Acute on chronic respiratory failure with hypoxemia (H)  J96.21  Today patient weaned off oxygen.  I-S encouraged every 4 hours while awake.  Out of bed for meals encouraged.      5. PAF (paroxysmal atrial fibrillation) (H)  I48.0  Rate controlled.  No longer on anticoagulation given her falls.          This note has been dictated using voice recognition software. Any grammatical or context distortions are unintentional and inherent to the software    Electronically signed by: Marni May CNP           Sincerely,        Marni May NP

## 2022-10-20 NOTE — PROGRESS NOTES
[facility]RIC [facility]    Code Status:  FULL CODE   Visit Type:   Chief Complaint   Patient presents with     Nursing Home Acute     TCU Follow up     Facility:  [facility] ([facility]) [[id number]]           Transitional Care Course: Mackenzie Brown is a 77 year old female who I am seeing today for follow-up ont TCU.  Patient recently hospitalized on 10/6/2022 at [facility].  Patient had a fall sustaining a C5 vertebral compression fracture and a left humeral fracture.  Past medical history includes generalized anxiety disorder, hypertension, hypothyroidism, obstructive sleep apnea on CPAP, GERD, presence of Watchman left atrial appendage closure device, senile dementia and congestive heart failure.  She was seen by neurology and underwent an MRI of the cervical spine.  Suggestion were to wear c-collar at all times.  She also had post fall tendinitis and dizziness.  Neurology felt this may be possible postconcussive syndrome versus intrinsic ear issue.  Exam negative for palatal muscle myoclonus per the records.  Recommended follow-up with ENT if tinnitus persists.  She also had some associated nausea.  Left humeral fracture.  She was seen by orthopedics and felt it was nonoperative.  She was placed in a sling.  She continues on morphine and Tylenol for pain.  She did develop acute on chronic hypoxia.  She was placed on oxygen.  She continues on this at 2 L.  Chest x-ray showed atelectasis.    On today's visit patient is sitting up in bed.  Denies prior patient underwent MRI of her neck.  Recent C5 vertebral compression fracture.  She continues in cervical collar.  She has a follow-up with spine in the a.m.  She continues on Tylenol and low-dose morphine for pain.  Scheduled morphine discontinued.  She continues to have as needed morphine.  She reports some increased pain during the night and felt her pain got away from her.  Today this is improved.  Post fall tendinitis.  She also has a follow-up with  ENT.  I have suggested vestibular training.  She will start this with therapy next week.  Humeral fracture.  She continues in splinting.  Positive CMS.  No further nausea.  Patient admitted on oxygen at 2 L.  Today she has been weaned off.  She does have underlying CHF.  Denies any increased shortness of breath.  No chest pain.  O2 sats have been greater than 90% on room air.  Weight stable.  Hypertension.  Blood pressure satisfactory.      Active Ambulatory Problems     Diagnosis Date Noted     Adrenal nodule (H) 11/08/2010     PAF (paroxysmal atrial fibrillation) (H) 09/22/2018     AK (actinic keratosis) 12/21/2011     Amnesia 12/14/2020     Blood typing encounter 03/20/2017     Bunion 01/25/2011     Cataracts, both eyes 10/19/2016     Chronic heart failure with preserved ejection fraction (H) 10/12/2019     Chronic back pain 10/29/2009     Chronic headache disorder 12/14/2020     Complications of medical care 10/29/2009     Depression with anxiety 12/14/2020     Diverticular disease of colon 02/16/2015     Diverticulitis 12/14/2020     Diverticulitis of colon 09/19/2010     Eczema 10/05/2010     Fibula fracture 04/27/2015     GERD (gastroesophageal reflux disease) 12/14/2020     History of colonic polyps 01/24/2011     Hypertension 07/28/2015     Insomnia 12/14/2020     Irritable bowel syndrome with diarrhea 10/14/2020     Knee pain, left 10/23/2015     Lactose intolerance 10/14/2020     Major depressive disorder, recurrent episode, mild (H) 11/28/2011     Malignant neoplasm of skin 11/11/2009     Migraine 10/29/2009     Mild cognitive impairment 10/23/2014     Toxic multinodular goiter 02/27/2015     Obesity 12/14/2020     Osteoarthrosis 10/29/2009     Primary osteoarthritis involving multiple joints 02/22/2017     Senile osteoporosis 04/15/2012     Sensorineural hearing loss, asymmetrical 11/23/2011     Spondylosis of lumbar region without myelopathy or radiculopathy 03/07/2017     Tibial plateau fracture  04/27/2015     Wrist fracture, left 10/28/2011     Neck pain 10/29/2009     Hemorrhage of rectum and anus 07/31/2019     Postoperative hypothyroidism 02/24/2021     Tachy-braydon syndrome (H) 12/07/2021     EVERT on CPAP 02/24/2021     Irregular bowel habits 07/31/2019     Hypoxemia 02/24/2021     Hemorrhoids 01/30/2020     Concussion 01/13/2021     Chest pain 09/01/2021     Change in bowel habits 10/11/2022     B12 deficiency 04/11/2022     Senile dementia, uncomplicated (H) 10/06/2022     Presence of Watchman left atrial appendage closure device 10/06/2022     Closed nondisplaced fracture of fifth cervical vertebra (H) 10/06/2022     Closed fracture of proximal end of humerus 10/06/2022     Acute on chronic respiratory failure with hypoxemia (H) 10/06/2022     Resolved Ambulatory Problems     Diagnosis Date Noted     Pain in limb 05/02/2008     Other postprocedural status(V45.89) 05/02/2008     Ulcer of varicose vein of left leg (H) 01/30/2019     No Additional Past Medical History     Allergies   Allergen Reactions     Demerol [Meperidine] Unknown     Levofloxacin Unknown     Metronidazole Unknown       All Meds and Allergies reviewed in the record at the facility and is the most up-to-date.      Current Outpatient Medications   Medication Sig     acetaminophen (TYLENOL) 500 MG tablet Take 1,000 mg by mouth 4 times daily     amLODIPine (NORVASC) 5 MG tablet Take 5 mg by mouth daily     aspirin (ASA) 325 MG EC tablet Take 1 tablet (325 mg) by mouth daily     buPROPion (WELLBUTRIN SR) 150 MG 12 hr tablet Take 1 tablet (150 mg) by mouth daily     Calcium-Magnesium-Vitamin D (CITRACAL SLOW RELEASE) 600- MG-MG-UNIT TB24 Take 600 mg by mouth     cholecalciferol 50 MCG (2000 UT) CAPS Take 2,000 Units by mouth     Cyanocobalamin 1000 MCG CAPS Take 1,000 mcg by mouth daily     donepezil (ARICEPT) 10 MG tablet      furosemide (LASIX) 20 MG tablet Take 20 mg by mouth every morning     irbesartan (AVAPRO) 150 MG tablet  "Take 1 tablet (150 mg) by mouth At Bedtime     levothyroxine (SYNTHROID/LEVOTHROID) 125 MCG tablet Take 1 tablet (125 mcg) by mouth daily     morphine (MSIR) 15 MG IR tablet Take 1 tablet (15 mg) by mouth every 6 hours as needed for severe pain     nitroGLYcerin (NITROSTAT) 0.4 MG sublingual tablet Place 1 tablet (0.4 mg) under the tongue every 5 minutes as needed for chest pain For chest pain place 1 tablet under the tongue every 5 minutes for 3 doses. If symptoms persist 5 minutes after 1st dose call 911.     ondansetron (ZOFRAN) 4 MG tablet Take 1 tablet (4 mg) by mouth every 8 hours as needed for nausea     pantoprazole sodium (PROTONIX) 40 MG packet Take 1 packet (40 mg) by mouth daily     PARoxetine (PAXIL) 40 MG tablet Take 1 tablet (40 mg) by mouth every morning     sennosides (SENOKOT) 8.6 MG tablet Take 1 tablet by mouth 2 times daily     traZODone (DESYREL) 50 MG tablet Take 1 tablet (50 mg) by mouth At Bedtime     No current facility-administered medications for this visit.       REVIEW OF SYSTEMS:   Review of Systems  10 point review of systems reviewed.  Pertinent positives in HPI.    PHYSICAL EXAMINATION:  Physical Exam     Vital signs: BP (!) 145/73   Pulse 74   Temp 97.8  F (36.6  C)   Resp 20   Ht 1.626 m (5' 4\")   Wt 79.7 kg (175 lb 9.6 oz)   SpO2 93%   BMI 30.14 kg/m    General: Awake, Alert, oriented x3, sitting up in bed, appropriately, follows simple commands, conversant  HEENT: Pink conjunctiva, anicteric sclerae, dry oral mucosa  NECK: Continues in cervical collar.  Sheepskin underneath.  CVS:  S1  S2, without murmur or gallop.   LUNG: Clear to auscultation, No wheezes, rales or rhonci.  Now off oxygen.  ABDOMEN: Soft,  with positive bowel sounds  EXTREMITIES: Moves both upper and lower extremities, with limitation to the left upper extremity.  Sling in place.  Positive CMS.  Bruising and swelling noted.  No pedal edema  SKIN: Warm and dry  NEUROLOGIC: Intact, pulses " palpable  PSYCHIATRIC: Pleasant affect.      Labs:  All labs reviewed in the nursing home record and Epic   @  Lab Results   Component Value Date    WBC 3.8 10/13/2019    WBC 6.1 02/04/2008     Lab Results   Component Value Date    RBC 4.44 10/13/2019    RBC 4.21 02/04/2008     Lab Results   Component Value Date    HGB 13.4 10/13/2019    HGB 12.5 02/04/2008     Lab Results   Component Value Date    HCT 40.7 10/13/2019    HCT 37.4 02/04/2008     Lab Results   Component Value Date    MCV 92 10/13/2019    MCV 89 02/04/2008     Lab Results   Component Value Date    MCH 30.2 10/13/2019    MCH 29.7 02/04/2008     Lab Results   Component Value Date    MCHC 32.9 10/13/2019    MCHC 33.4 02/04/2008     Lab Results   Component Value Date    RDW 13.9 10/13/2019    RDW 13.2 02/04/2008     Lab Results   Component Value Date     10/13/2019     02/04/2008        @Last Comprehensive Metabolic Panel:  Sodium   Date Value Ref Range Status   10/17/2022 143 136 - 145 mmol/L Final     Potassium   Date Value Ref Range Status   10/17/2022 4.3 3.4 - 5.3 mmol/L Final   10/15/2019 5.0 3.5 - 5.0 mmol/L Final     Chloride   Date Value Ref Range Status   10/17/2022 99 98 - 107 mmol/L Final   10/14/2019 103 98 - 107 mmol/L Final     Carbon Dioxide (CO2)   Date Value Ref Range Status   10/17/2022 36 (H) 22 - 29 mmol/L Final   10/14/2019 28 22 - 31 mmol/L Final     Anion Gap   Date Value Ref Range Status   10/17/2022 8 7 - 15 mmol/L Final   10/14/2019 8 5 - 18 mmol/L Final     Glucose   Date Value Ref Range Status   10/17/2022 89 70 - 99 mg/dL Final   10/14/2019 111 70 - 125 mg/dL Final     Urea Nitrogen   Date Value Ref Range Status   10/17/2022 7.5 (L) 8.0 - 23.0 mg/dL Final   10/14/2019 12 8 - 28 mg/dL Final     Creatinine   Date Value Ref Range Status   10/17/2022 0.64 0.51 - 0.95 mg/dL Final     GFR Estimate   Date Value Ref Range Status   10/17/2022 89 >60 mL/min/1.73m2 Final     Comment:     Effective December 21, 2021 eGFRcr  in adults is calculated using the 2021 CKD-EPI creatinine equation which includes age and gender (Kacy et al., [place], DOI: 10.1056/QGWRzv6641665)   10/14/2019 >60 >60 mL/min/1.73m2 Final     Calcium   Date Value Ref Range Status   10/17/2022 9.0 8.8 - 10.2 mg/dL Final     Assessment/plan: Reviewed with changes.      ICD-10-CM    1. Compression fracture of C5 vertebra with routine healing, subsequent encounter  S12.490D  Patient went for an MRI of her neck today.  She has a follow-up with [facility] on 10/20.  Continue Tylenol and low-dose morphine for pain.  Scheduled morphine discontinued.  Continue as needed.    Post fall tinnitus.  Follow-up with ENT in the a.m.   We will attempt vestibular retraining and therapy at the facility.      2. Closed fracture of proximal end of left humerus with routine healing, unspecified fracture morphology, subsequent encounter  S42.202D  Continues in a sling.  She has a follow-up with Ortho on 11/3.         3. CHF (congestive heart failure), NYHA class I, acute, diastolic (H)  I50.31 Appears compensated with 20 mg of Lasix daily.  Weight stable.  No lower extremity edema.        4. Acute on chronic respiratory failure with hypoxemia (H)  J96.21  Today patient weaned off oxygen.  I-S encouraged every 4 hours while awake.  Out of bed for meals encouraged.      5. PAF (paroxysmal atrial fibrillation) (H)  I48.0  Rate controlled.  No longer on anticoagulation given her falls.          This note has been dictated using voice recognition software. Any grammatical or context distortions are unintentional and inherent to the software    Electronically signed by: Marni May, CNP

## 2022-10-21 ENCOUNTER — TELEPHONE (OUTPATIENT)
Dept: GERIATRICS | Facility: CLINIC | Age: 77
End: 2022-10-21

## 2022-10-21 ENCOUNTER — LAB REQUISITION (OUTPATIENT)
Dept: LAB | Facility: CLINIC | Age: 77
End: 2022-10-21
Payer: COMMERCIAL

## 2022-10-21 DIAGNOSIS — R39.15 URGENCY OF URINATION: ICD-10-CM

## 2022-10-21 LAB
ALBUMIN UR-MCNC: 30 MG/DL
APPEARANCE UR: CLEAR
BILIRUB UR QL STRIP: NEGATIVE
COLOR UR AUTO: YELLOW
GLUCOSE UR STRIP-MCNC: NEGATIVE MG/DL
HGB UR QL STRIP: NEGATIVE
HYALINE CASTS: 3 /LPF
KETONES UR STRIP-MCNC: ABNORMAL MG/DL
LEUKOCYTE ESTERASE UR QL STRIP: ABNORMAL
MUCOUS THREADS #/AREA URNS LPF: PRESENT /LPF
NITRATE UR QL: NEGATIVE
PH UR STRIP: 5.5 [PH] (ref 5–7)
RBC URINE: <1 /HPF
SP GR UR STRIP: 1.04 (ref 1–1.03)
SQUAMOUS EPITHELIAL: 3 /HPF
UROBILINOGEN UR STRIP-MCNC: 2 MG/DL
WBC URINE: 3 /HPF

## 2022-10-21 PROCEDURE — 87086 URINE CULTURE/COLONY COUNT: CPT | Mod: ORL | Performed by: FAMILY MEDICINE

## 2022-10-21 PROCEDURE — 81001 URINALYSIS AUTO W/SCOPE: CPT | Mod: ORL | Performed by: FAMILY MEDICINE

## 2022-10-21 NOTE — TELEPHONE ENCOUNTER
[facility] Triage Nurse Telephone Encounter    Provider: VINCENZO Rivas  Facility: [facility] Facility Type:  TCU    Caller: Lucita  Call Back Number: [phone]    Allergies:    Allergies   Allergen Reactions     Demerol [Meperidine] Unknown     Levofloxacin Unknown     Metronidazole Unknown        Reason for call: Nurse called to report that patient is complaining of urgency.  Patient has refused her Lasix for the past 2 days cause she thought that was the cause of the urgency but she is still having the urge to urinate every 10 minutes.  No fever and no flank pain.      Verbal Order/Direction given by Provider: Okay for UA/UC via straight cath.      Provider giving Order:  VINCENZO Rivas    Verbal Order given to: Lucita Woods RN

## 2022-10-23 LAB — BACTERIA UR CULT: NORMAL

## 2022-10-25 ENCOUNTER — TRANSITIONAL CARE UNIT VISIT (OUTPATIENT)
Dept: GERIATRICS | Facility: CLINIC | Age: 77
End: 2022-10-25
Payer: COMMERCIAL

## 2022-10-25 ENCOUNTER — LAB REQUISITION (OUTPATIENT)
Dept: LAB | Facility: CLINIC | Age: 77
End: 2022-10-25
Payer: COMMERCIAL

## 2022-10-25 VITALS
BODY MASS INDEX: 30.05 KG/M2 | RESPIRATION RATE: 20 BRPM | HEART RATE: 90 BPM | WEIGHT: 176 LBS | DIASTOLIC BLOOD PRESSURE: 63 MMHG | HEIGHT: 64 IN | OXYGEN SATURATION: 94 % | SYSTOLIC BLOOD PRESSURE: 121 MMHG | TEMPERATURE: 97.9 F

## 2022-10-25 DIAGNOSIS — S12.490D COMPRESSION FRACTURE OF C5 VERTEBRA WITH ROUTINE HEALING, SUBSEQUENT ENCOUNTER: Primary | ICD-10-CM

## 2022-10-25 DIAGNOSIS — I50.31 CHF (CONGESTIVE HEART FAILURE), NYHA CLASS I, ACUTE, DIASTOLIC (H): ICD-10-CM

## 2022-10-25 DIAGNOSIS — J96.21 ACUTE ON CHRONIC RESPIRATORY FAILURE WITH HYPOXEMIA (H): ICD-10-CM

## 2022-10-25 DIAGNOSIS — I48.0 PAF (PAROXYSMAL ATRIAL FIBRILLATION) (H): ICD-10-CM

## 2022-10-25 DIAGNOSIS — S12.401D: ICD-10-CM

## 2022-10-25 DIAGNOSIS — S42.402D UNSPECIFIED FRACTURE OF LOWER END OF LEFT HUMERUS, SUBSEQUENT ENCOUNTER FOR FRACTURE WITH ROUTINE HEALING: ICD-10-CM

## 2022-10-25 DIAGNOSIS — S42.202D CLOSED FRACTURE OF PROXIMAL END OF LEFT HUMERUS WITH ROUTINE HEALING, UNSPECIFIED FRACTURE MORPHOLOGY, SUBSEQUENT ENCOUNTER: ICD-10-CM

## 2022-10-25 PROCEDURE — 99309 SBSQ NF CARE MODERATE MDM 30: CPT | Performed by: NURSE PRACTITIONER

## 2022-10-25 NOTE — LETTER
10/25/2022        RE: Mackenzie Brown  [address]  [facility] [address]        [facility]    Code Status:  FULL CODE   Visit Type:   Chief Complaint   Patient presents with     Nursing Home Acute     TCU Follow up     Facility:  [facility]) [[id number]]           Transitional Care Course: Mackenzie Brown is a 77 year old female who I am seeing today for follow-up ont TCU.  Patient recently hospitalized on 10/6/2022 at [facility].  Patient had a fall sustaining a C5 vertebral compression fracture and a left humeral fracture.  Past medical history includes generalized anxiety disorder, hypertension, hypothyroidism, obstructive sleep apnea on CPAP, GERD, presence of Watchman left atrial appendage closure device, senile dementia and congestive heart failure.  She was seen by neurology and underwent an MRI of the cervical spine.  Suggestion were to wear c-collar at all times.  She also had post fall tendinitis and dizziness.  Neurology felt this may be possible postconcussive syndrome versus intrinsic ear issue.  Exam negative for palatal muscle myoclonus per the records.  Recommended follow-up with ENT if tinnitus persists.  She also had some associated nausea.  Left humeral fracture.  She was seen by orthopedics and felt it was nonoperative.  She was placed in a sling.  She continues on morphine and Tylenol for pain.  She did develop acute on chronic hypoxia.  She was placed on oxygen.  She continues on this at 2 L.  Chest x-ray showed atelectasis.    On today's visit patient is sitting up in bed.  Patient with a recent C5 vertebral compression fracture.  She underwent MRI of her neck.  She had a follow-up with neurosurgery.  We are awaiting orders and recommendations.  Patient returned without any information.  Nursing had placed a call.  She continues in cervical collar.  She continues on Tylenol and low-dose morphine for pain.  Recent left humeral fracture.   She continues in splinting.  She had a follow-up with Ortho.  Per the consult notes her morphine was reduced to 2 mg.  There is no 2 mg tablet.  I did discuss with patient.  She is anticipating discharge early next week.  We will decrease morphine to 7.5 mg and attempt to wean off prior to her discharge.  She has had some periods of increased confusion.  Her daughter wishes to wean her off as soon as possible.  She continues with some ongoing nausea however she reports this was present prior to hospitalization.  She takes as needed Zofran at home and is continued on this at the TCU.  She did have increased nausea on today's visit.  Noted underlying anxiety.  Patient previously on oxygen.  She has been weaned off.  She does have underlying CHF.  She denies any increased shortness of breath or chest pain.  Recent weight gain of about 5 pounds in 2 days.  Her Lasix was resumed at 10 mg daily.  Hypertension.  Blood pressure satisfactory.      Active Ambulatory Problems     Diagnosis Date Noted     Adrenal nodule (H) 11/08/2010     PAF (paroxysmal atrial fibrillation) (H) 09/22/2018     AK (actinic keratosis) 12/21/2011     Amnesia 12/14/2020     Blood typing encounter 03/20/2017     Bunion 01/25/2011     Cataracts, both eyes 10/19/2016     Chronic heart failure with preserved ejection fraction (H) 10/12/2019     Chronic back pain 10/29/2009     Chronic headache disorder 12/14/2020     Complications of medical care 10/29/2009     Depression with anxiety 12/14/2020     Diverticular disease of colon 02/16/2015     Diverticulitis 12/14/2020     Diverticulitis of colon 09/19/2010     Eczema 10/05/2010     Fibula fracture 04/27/2015     GERD (gastroesophageal reflux disease) 12/14/2020     History of colonic polyps 01/24/2011     Hypertension 07/28/2015     Insomnia 12/14/2020     Irritable bowel syndrome with diarrhea 10/14/2020     Knee pain, left 10/23/2015     Lactose intolerance 10/14/2020     Major depressive disorder,  recurrent episode, mild (H) 11/28/2011     Malignant neoplasm of skin 11/11/2009     Migraine 10/29/2009     Mild cognitive impairment 10/23/2014     Toxic multinodular goiter 02/27/2015     Obesity 12/14/2020     Osteoarthrosis 10/29/2009     Primary osteoarthritis involving multiple joints 02/22/2017     Senile osteoporosis 04/15/2012     Sensorineural hearing loss, asymmetrical 11/23/2011     Spondylosis of lumbar region without myelopathy or radiculopathy 03/07/2017     Tibial plateau fracture 04/27/2015     Wrist fracture, left 10/28/2011     Neck pain 10/29/2009     Hemorrhage of rectum and anus 07/31/2019     Postoperative hypothyroidism 02/24/2021     Tachy-braydon syndrome (H) 12/07/2021     EVERT on CPAP 02/24/2021     Irregular bowel habits 07/31/2019     Hypoxemia 02/24/2021     Hemorrhoids 01/30/2020     Concussion 01/13/2021     Chest pain 09/01/2021     Change in bowel habits 10/11/2022     B12 deficiency 04/11/2022     Senile dementia, uncomplicated (H) 10/06/2022     Presence of Watchman left atrial appendage closure device 10/06/2022     Closed nondisplaced fracture of fifth cervical vertebra (H) 10/06/2022     Closed fracture of proximal end of humerus 10/06/2022     Acute on chronic respiratory failure with hypoxemia (H) 10/06/2022     Resolved Ambulatory Problems     Diagnosis Date Noted     Pain in limb 05/02/2008     Other postprocedural status(V45.89) 05/02/2008     Ulcer of varicose vein of left leg (H) 01/30/2019     No Additional Past Medical History     Allergies   Allergen Reactions     Demerol [Meperidine] Unknown     Levofloxacin Unknown     Metronidazole Unknown       All Meds and Allergies reviewed in the record at the facility and is the most up-to-date.      Current Outpatient Medications   Medication Sig     acetaminophen (TYLENOL) 500 MG tablet Take 1,000 mg by mouth 4 times daily     amLODIPine (NORVASC) 5 MG tablet Take 5 mg by mouth daily     aspirin (ASA) 325 MG EC tablet Take 1  "tablet (325 mg) by mouth daily     buPROPion (WELLBUTRIN SR) 150 MG 12 hr tablet Take 1 tablet (150 mg) by mouth daily     Calcium-Magnesium-Vitamin D (CITRACAL SLOW RELEASE) 600- MG-MG-UNIT TB24 Take 600 mg by mouth     cholecalciferol 50 MCG (2000 UT) CAPS Take 2,000 Units by mouth     Cyanocobalamin 1000 MCG CAPS Take 1,000 mcg by mouth daily     donepezil (ARICEPT) 10 MG tablet      furosemide (LASIX) 20 MG tablet Take 20 mg by mouth every morning     irbesartan (AVAPRO) 150 MG tablet Take 1 tablet (150 mg) by mouth At Bedtime     levothyroxine (SYNTHROID/LEVOTHROID) 125 MCG tablet Take 1 tablet (125 mcg) by mouth daily     morphine (MSIR) 15 MG IR tablet Take 1 tablet (15 mg) by mouth every 6 hours as needed for severe pain (Patient taking differently: Take 15 mg by mouth every 6 hours as needed for severe pain Give half tab to equal 7.5 mg every 6 hours as needed)     nitroGLYcerin (NITROSTAT) 0.4 MG sublingual tablet Place 1 tablet (0.4 mg) under the tongue every 5 minutes as needed for chest pain For chest pain place 1 tablet under the tongue every 5 minutes for 3 doses. If symptoms persist 5 minutes after 1st dose call 911.     ondansetron (ZOFRAN) 4 MG tablet Take 1 tablet (4 mg) by mouth every 8 hours as needed for nausea     pantoprazole sodium (PROTONIX) 40 MG packet Take 1 packet (40 mg) by mouth daily     PARoxetine (PAXIL) 40 MG tablet Take 1 tablet (40 mg) by mouth every morning     sennosides (SENOKOT) 8.6 MG tablet Take 1 tablet by mouth 2 times daily     traZODone (DESYREL) 50 MG tablet Take 1 tablet (50 mg) by mouth At Bedtime     No current facility-administered medications for this visit.       REVIEW OF SYSTEMS:   Review of Systems  10 point review of systems reviewed.  Pertinent positives in HPI.    PHYSICAL EXAMINATION:  Physical Exam     Vital signs: /63   Pulse 90   Temp 97.9  F (36.6  C)   Resp 20   Ht 1.626 m (5' 4\")   Wt 79.8 kg (176 lb)   SpO2 94%   BMI 30.21 kg/m  "   General: Awake, Alert, sitting up in bed, conversant  HEENT: Pink conjunctiva, anicteric sclerae, dry oral mucosa  NECK: Continues in cervical collar.  Sheepskin underneath.  CVS:  S1  S2, without murmur or gallop.   LUNG: Clear to auscultation, No wheezes, rales or rhonci.  EXTREMITIES: Moves both upper and lower extremities, with limitation to the left upper extremity.  Sling in place.  Positive CMS.   SKIN: Warm and dry  NEUROLOGIC: Intact, pulses palpable  PSYCHIATRIC: Flat affect.      Labs:  All labs reviewed in the nursing home record and Epic   @  Lab Results   Component Value Date    WBC 3.8 10/13/2019    WBC 6.1 02/04/2008     Lab Results   Component Value Date    RBC 4.44 10/13/2019    RBC 4.21 02/04/2008     Lab Results   Component Value Date    HGB 13.4 10/13/2019    HGB 12.5 02/04/2008     Lab Results   Component Value Date    HCT 40.7 10/13/2019    HCT 37.4 02/04/2008     Lab Results   Component Value Date    MCV 92 10/13/2019    MCV 89 02/04/2008     Lab Results   Component Value Date    MCH 30.2 10/13/2019    MCH 29.7 02/04/2008     Lab Results   Component Value Date    MCHC 32.9 10/13/2019    MCHC 33.4 02/04/2008     Lab Results   Component Value Date    RDW 13.9 10/13/2019    RDW 13.2 02/04/2008     Lab Results   Component Value Date     10/13/2019     02/04/2008        @Last Comprehensive Metabolic Panel:  Sodium   Date Value Ref Range Status   10/17/2022 143 136 - 145 mmol/L Final     Potassium   Date Value Ref Range Status   10/17/2022 4.3 3.4 - 5.3 mmol/L Final   10/15/2019 5.0 3.5 - 5.0 mmol/L Final     Chloride   Date Value Ref Range Status   10/17/2022 99 98 - 107 mmol/L Final   10/14/2019 103 98 - 107 mmol/L Final     Carbon Dioxide (CO2)   Date Value Ref Range Status   10/17/2022 36 (H) 22 - 29 mmol/L Final   10/14/2019 28 22 - 31 mmol/L Final     Anion Gap   Date Value Ref Range Status   10/17/2022 8 7 - 15 mmol/L Final   10/14/2019 8 5 - 18 mmol/L Final     Glucose    Date Value Ref Range Status   10/17/2022 89 70 - 99 mg/dL Final   10/14/2019 111 70 - 125 mg/dL Final     Urea Nitrogen   Date Value Ref Range Status   10/17/2022 7.5 (L) 8.0 - 23.0 mg/dL Final   10/14/2019 12 8 - 28 mg/dL Final     Creatinine   Date Value Ref Range Status   10/17/2022 0.64 0.51 - 0.95 mg/dL Final     GFR Estimate   Date Value Ref Range Status   10/17/2022 89 >60 mL/min/1.73m2 Final     Comment:     Effective December 21, 2021 eGFRcr in adults is calculated using the 2021 CKD-EPI creatinine equation which includes age and gender (Kacy et al., NEJ, DOI: 10.1056/YDOBfe9318678)   10/14/2019 >60 >60 mL/min/1.73m2 Final     Calcium   Date Value Ref Range Status   10/17/2022 9.0 8.8 - 10.2 mg/dL Final     Assessment/plan:       ICD-10-CM    1. Compression fracture of C5 vertebra with routine healing, subsequent encounter  S12.490D  X-rays taken during her Ortho visit yesterday.  Awaiting results and recommendations.  Continues in cervical collar.      2. Closed fracture of proximal end of left humerus with routine healing, unspecified fracture morphology, subsequent encounter  S42.202D  Continue to be nonweightbearing.  She is in a sling.  Pain medication reduced during Ortho appointment to 2 mg.  However morphine does not come in a 2 mg tablet.  Currently on 15 mg every 6 hours as needed.  Will reduce to 7.5 mg every 6 hours as needed.  Continue Tylenol every 6 hours.  Also suggested tramadol however patient declines.      3. PAF (paroxysmal atrial fibrillation) (H)  I48.0  Pacemaker placement with recent atrial fibs seen.  She was seen by cardiology and placed on aspirin 81 mg twice daily.  No longer on anticoagulation given her falls.      4. Acute on chronic respiratory failure with hypoxemia (H)  J96.21  She has been weaned off her oxygen.      5. CHF (congestive heart failure), NYHA class I, acute, diastolic (H)  I50.31  Recently resumed her Lasix 10 mg daily.          This note has been  dictated using voice recognition software. Any grammatical or context distortions are unintentional and inherent to the software    Electronically signed by: Marni May CNP           Sincerely,        Marni May NP

## 2022-10-26 ENCOUNTER — TRANSITIONAL CARE UNIT VISIT (OUTPATIENT)
Dept: GERIATRICS | Facility: CLINIC | Age: 77
End: 2022-10-26
Payer: COMMERCIAL

## 2022-10-26 VITALS
WEIGHT: 175.4 LBS | HEIGHT: 64 IN | SYSTOLIC BLOOD PRESSURE: 175 MMHG | TEMPERATURE: 97.9 F | RESPIRATION RATE: 20 BRPM | HEART RATE: 69 BPM | BODY MASS INDEX: 29.94 KG/M2 | DIASTOLIC BLOOD PRESSURE: 78 MMHG | OXYGEN SATURATION: 91 %

## 2022-10-26 DIAGNOSIS — S12.490D COMPRESSION FRACTURE OF C5 VERTEBRA WITH ROUTINE HEALING, SUBSEQUENT ENCOUNTER: Primary | ICD-10-CM

## 2022-10-26 DIAGNOSIS — S42.202D CLOSED FRACTURE OF PROXIMAL END OF LEFT HUMERUS WITH ROUTINE HEALING, UNSPECIFIED FRACTURE MORPHOLOGY, SUBSEQUENT ENCOUNTER: ICD-10-CM

## 2022-10-26 DIAGNOSIS — R52 PAIN MANAGEMENT: ICD-10-CM

## 2022-10-26 PROCEDURE — 99310 SBSQ NF CARE HIGH MDM 45: CPT | Performed by: NURSE PRACTITIONER

## 2022-10-26 RX ORDER — TRAMADOL HYDROCHLORIDE 50 MG/1
50 TABLET ORAL EVERY 6 HOURS PRN
COMMUNITY

## 2022-10-26 NOTE — LETTER
10/26/2022        RE: Mackenzie Brown  [address]  [facility] [address]        [facility]    Code Status:  FULL CODE   Visit Type:   Chief Complaint   Patient presents with     Nursing Home Acute     TCU Follow up     Facility:  [facility] ([facility]) [[id number]]           Transitional Care Course: Mackenzie Brown is a 77 year old female who I am seeing today for follow-up on the TCU.  Patient recently hospitalized on 10/6/2022 at [facility].  Patient had a fall sustaining a C5 vertebral compression fracture and a left humeral fracture.  Past medical history includes generalized anxiety disorder, hypertension, hypothyroidism, obstructive sleep apnea on CPAP, GERD, presence of Watchman left atrial appendage closure device, senile dementia and congestive heart failure.  She was seen by neurology and underwent an MRI of the cervical spine.  Suggestion were to wear c-collar at all times.  She also had post fall tendinitis and dizziness.  Neurology felt this may be possible postconcussive syndrome versus intrinsic ear issue.  Exam negative for palatal muscle myoclonus per the records.  Recommended follow-up with ENT if tinnitus persists.  She also had some associated nausea.  Left humeral fracture.  She was seen by orthopedics and felt it was nonoperative.  She was placed in a sling.  She continues on morphine and Tylenol for pain.  She did develop acute on chronic hypoxia.  She was placed on oxygen.  She continues on this at 2 L.  Chest x-ray showed atelectasis.    On today's visit patient is sitting up in bed. Patient with a recent C5 vertebral compression fracture and left humeral fracture. 2 days prior she had a follow up with ortho and her Morphine had been reduced to 2 mg however this dosage is unavailable so she was placed reduced to 7.5 mg. I was phoned later in the evening in regards to increased pain and request for more pain medicine. She was given 15 mg last  evening. Today she is very upset about her pain meds being reduced. Her daughter is present on exam. Ortho notes reviewed. Pt very adamant she needs 15 mg. Review of the records show she is only recieivng pain meds about 3 times daily. She has hx of recurrent falls and has had some memory impairment related to narcotics. I talked at length about attempting to reduce meds as well as trying Tramadol instead. Pt has taken Tramadol in the past and tolerated well. Pt very anxious about changing meds. She has had some ongoing nausea in which she is taking Zofran. This am her bp was slightly elevated. She is also experiencing dry mouth. Her daughter is in agreement in trialing Tramadol. OT also present during exam and reported to pt they will initiate US therapy to her Left arm for pain modalities.     Active Ambulatory Problems     Diagnosis Date Noted     Adrenal nodule (H) 11/08/2010     PAF (paroxysmal atrial fibrillation) (H) 09/22/2018     AK (actinic keratosis) 12/21/2011     Amnesia 12/14/2020     Blood typing encounter 03/20/2017     Bunion 01/25/2011     Cataracts, both eyes 10/19/2016     Chronic heart failure with preserved ejection fraction (H) 10/12/2019     Chronic back pain 10/29/2009     Chronic headache disorder 12/14/2020     Complications of medical care 10/29/2009     Depression with anxiety 12/14/2020     Diverticular disease of colon 02/16/2015     Diverticulitis 12/14/2020     Diverticulitis of colon 09/19/2010     Eczema 10/05/2010     Fibula fracture 04/27/2015     GERD (gastroesophageal reflux disease) 12/14/2020     History of colonic polyps 01/24/2011     Hypertension 07/28/2015     Insomnia 12/14/2020     Irritable bowel syndrome with diarrhea 10/14/2020     Knee pain, left 10/23/2015     Lactose intolerance 10/14/2020     Major depressive disorder, recurrent episode, mild (H) 11/28/2011     Malignant neoplasm of skin 11/11/2009     Migraine 10/29/2009     Mild cognitive impairment 10/23/2014      Toxic multinodular goiter 02/27/2015     Obesity 12/14/2020     Osteoarthrosis 10/29/2009     Primary osteoarthritis involving multiple joints 02/22/2017     Senile osteoporosis 04/15/2012     Sensorineural hearing loss, asymmetrical 11/23/2011     Spondylosis of lumbar region without myelopathy or radiculopathy 03/07/2017     Tibial plateau fracture 04/27/2015     Wrist fracture, left 10/28/2011     Neck pain 10/29/2009     Hemorrhage of rectum and anus 07/31/2019     Postoperative hypothyroidism 02/24/2021     Tachy-braydon syndrome (H) 12/07/2021     EVERT on CPAP 02/24/2021     Irregular bowel habits 07/31/2019     Hypoxemia 02/24/2021     Hemorrhoids 01/30/2020     Concussion 01/13/2021     Chest pain 09/01/2021     Change in bowel habits 10/11/2022     B12 deficiency 04/11/2022     Senile dementia, uncomplicated (H) 10/06/2022     Presence of Watchman left atrial appendage closure device 10/06/2022     Closed nondisplaced fracture of fifth cervical vertebra (H) 10/06/2022     Closed fracture of proximal end of humerus 10/06/2022     Acute on chronic respiratory failure with hypoxemia (H) 10/06/2022     Resolved Ambulatory Problems     Diagnosis Date Noted     Pain in limb 05/02/2008     Other postprocedural status(V45.89) 05/02/2008     Ulcer of varicose vein of left leg (H) 01/30/2019     No Additional Past Medical History     Allergies   Allergen Reactions     Demerol [Meperidine] Unknown     Levofloxacin Unknown     Metronidazole Unknown       All Meds and Allergies reviewed in the record at the facility and is the most up-to-date.      Current Outpatient Medications   Medication Sig     traMADol (ULTRAM) 50 MG tablet Take 50 mg by mouth every 6 hours as needed for severe pain     acetaminophen (TYLENOL) 500 MG tablet Take 1,000 mg by mouth 4 times daily     amLODIPine (NORVASC) 5 MG tablet Take 5 mg by mouth daily     aspirin (ASA) 325 MG EC tablet Take 1 tablet (325 mg) by mouth daily     buPROPion  "(WELLBUTRIN SR) 150 MG 12 hr tablet Take 1 tablet (150 mg) by mouth daily     Calcium-Magnesium-Vitamin D (CITRACAL SLOW RELEASE) 600- MG-MG-UNIT TB24 Take 600 mg by mouth     cholecalciferol 50 MCG (2000 UT) CAPS Take 2,000 Units by mouth     Cyanocobalamin 1000 MCG CAPS Take 1,000 mcg by mouth daily     donepezil (ARICEPT) 10 MG tablet      furosemide (LASIX) 20 MG tablet Take 20 mg by mouth every morning     irbesartan (AVAPRO) 150 MG tablet Take 1 tablet (150 mg) by mouth At Bedtime     levothyroxine (SYNTHROID/LEVOTHROID) 125 MCG tablet Take 1 tablet (125 mcg) by mouth daily     nitroGLYcerin (NITROSTAT) 0.4 MG sublingual tablet Place 1 tablet (0.4 mg) under the tongue every 5 minutes as needed for chest pain For chest pain place 1 tablet under the tongue every 5 minutes for 3 doses. If symptoms persist 5 minutes after 1st dose call 911.     ondansetron (ZOFRAN) 4 MG tablet Take 1 tablet (4 mg) by mouth every 8 hours as needed for nausea     pantoprazole sodium (PROTONIX) 40 MG packet Take 1 packet (40 mg) by mouth daily     PARoxetine (PAXIL) 40 MG tablet Take 1 tablet (40 mg) by mouth every morning     sennosides (SENOKOT) 8.6 MG tablet Take 1 tablet by mouth 2 times daily     traZODone (DESYREL) 50 MG tablet Take 1 tablet (50 mg) by mouth At Bedtime     No current facility-administered medications for this visit.       REVIEW OF SYSTEMS:   Review of Systems  7 point review of systems reviewed.  Pertinent positives in HPI.    PHYSICAL EXAMINATION:  Physical Exam     Vital signs: BP (!) 175/78   Pulse 69   Temp 97.9  F (36.6  C)   Resp 20   Ht 1.626 m (5' 4\")   Wt 79.6 kg (175 lb 6.4 oz)   SpO2 91%   BMI 30.11 kg/m    General: Awake, Alert, sitting up in bed, conversant  HEENT: Pink conjunctiva, anicteric sclerae, dry oral mucosa  NECK: Continues in cervical collar.  Sheepskin underneath.  CVS:  S1  S2, without murmur or gallop.   LUNG: Clear to auscultation, No wheezes, rales or " rhonci.  EXTREMITIES: Moves both upper and lower extremities, with limitation to the left upper extremity.  Sling in place.  Positive CMS.   SKIN: Warm and dry  NEUROLOGIC: Intact, pulses palpable  PSYCHIATRIC: Anxious, upset       Labs:  All labs reviewed in the nursing home record and Epic   @  Lab Results   Component Value Date    WBC 3.8 10/13/2019    WBC 6.1 02/04/2008     Lab Results   Component Value Date    RBC 4.44 10/13/2019    RBC 4.21 02/04/2008     Lab Results   Component Value Date    HGB 13.4 10/13/2019    HGB 12.5 02/04/2008     Lab Results   Component Value Date    HCT 40.7 10/13/2019    HCT 37.4 02/04/2008     Lab Results   Component Value Date    MCV 92 10/13/2019    MCV 89 02/04/2008     Lab Results   Component Value Date    MCH 30.2 10/13/2019    MCH 29.7 02/04/2008     Lab Results   Component Value Date    MCHC 32.9 10/13/2019    MCHC 33.4 02/04/2008     Lab Results   Component Value Date    RDW 13.9 10/13/2019    RDW 13.2 02/04/2008     Lab Results   Component Value Date     10/13/2019     02/04/2008        @Last Comprehensive Metabolic Panel:  Sodium   Date Value Ref Range Status   10/17/2022 143 136 - 145 mmol/L Final     Potassium   Date Value Ref Range Status   10/17/2022 4.3 3.4 - 5.3 mmol/L Final   10/15/2019 5.0 3.5 - 5.0 mmol/L Final     Chloride   Date Value Ref Range Status   10/17/2022 99 98 - 107 mmol/L Final   10/14/2019 103 98 - 107 mmol/L Final     Carbon Dioxide (CO2)   Date Value Ref Range Status   10/17/2022 36 (H) 22 - 29 mmol/L Final   10/14/2019 28 22 - 31 mmol/L Final     Anion Gap   Date Value Ref Range Status   10/17/2022 8 7 - 15 mmol/L Final   10/14/2019 8 5 - 18 mmol/L Final     Glucose   Date Value Ref Range Status   10/17/2022 89 70 - 99 mg/dL Final   10/14/2019 111 70 - 125 mg/dL Final     Urea Nitrogen   Date Value Ref Range Status   10/17/2022 7.5 (L) 8.0 - 23.0 mg/dL Final   10/14/2019 12 8 - 28 mg/dL Final     Creatinine   Date Value Ref Range  Status   10/17/2022 0.64 0.51 - 0.95 mg/dL Final     GFR Estimate   Date Value Ref Range Status   10/17/2022 89 >60 mL/min/1.73m2 Final     Comment:     Effective December 21, 2021 eGFRcr in adults is calculated using the 2021 CKD-EPI creatinine equation which includes age and gender (Kacy et al., NEJ, DOI: 10.1056/[id number])   10/14/2019 >60 >60 mL/min/1.73m2 Final     Calcium   Date Value Ref Range Status   10/17/2022 9.0 8.8 - 10.2 mg/dL Final     Assessment/plan:       ICD-10-CM    1. Compression fracture of C5 vertebra with routine healing, subsequent encounter  S12.490D       2. Closed fracture of proximal end of left humerus with routine healing, unspecified fracture morphology, subsequent encounter  S42.202D       3. Pain management  R52         Recent reduction in Morphine to 7.5 mg.  Pt reporting increased pain. Discontinue Morphine. Start Tramadol 50 mg Q 6 hours prn. Continue tylenol 1000 mg Q 6 hour. Nursing to complete pain assessments Q 6 hours. Therapy to start US to left arm for pain modalities. Pt received info that she no longer has to wear her cervical collar.     Total time spent for this visit was 35 minutes greater than 50% of time spent face-to-face with patient and her daughter reviewing pain medications, risk and benefits as well as above plan of care.     This note has been dictated using voice recognition software. Any grammatical or context distortions are unintentional and inherent to the software    Electronically signed by: Marni May CNP           Sincerely,        Marni May NP

## 2022-10-27 ENCOUNTER — LAB REQUISITION (OUTPATIENT)
Dept: LAB | Facility: CLINIC | Age: 77
End: 2022-10-27
Payer: COMMERCIAL

## 2022-10-27 ENCOUNTER — TRANSITIONAL CARE UNIT VISIT (OUTPATIENT)
Dept: GERIATRICS | Facility: CLINIC | Age: 77
End: 2022-10-27
Payer: COMMERCIAL

## 2022-10-27 VITALS
HEART RATE: 73 BPM | RESPIRATION RATE: 16 BRPM | OXYGEN SATURATION: 92 % | SYSTOLIC BLOOD PRESSURE: 167 MMHG | HEIGHT: 64 IN | WEIGHT: 175.4 LBS | DIASTOLIC BLOOD PRESSURE: 84 MMHG | BODY MASS INDEX: 29.94 KG/M2 | TEMPERATURE: 96.9 F

## 2022-10-27 DIAGNOSIS — G44.019 EPISODIC CLUSTER HEADACHE, NOT INTRACTABLE: ICD-10-CM

## 2022-10-27 DIAGNOSIS — J10.1 INFLUENZA A: ICD-10-CM

## 2022-10-27 DIAGNOSIS — R19.7 DIARRHEA, UNSPECIFIED TYPE: ICD-10-CM

## 2022-10-27 DIAGNOSIS — R19.7 DIARRHEA, UNSPECIFIED: ICD-10-CM

## 2022-10-27 DIAGNOSIS — R11.0 NAUSEA: ICD-10-CM

## 2022-10-27 LAB
ANION GAP SERPL CALCULATED.3IONS-SCNC: 11 MMOL/L (ref 7–15)
BUN SERPL-MCNC: 6.8 MG/DL (ref 8–23)
C DIFF TOX B STL QL: NEGATIVE
CALCIUM SERPL-MCNC: 8.9 MG/DL (ref 8.8–10.2)
CHLORIDE SERPL-SCNC: 100 MMOL/L (ref 98–107)
CREAT SERPL-MCNC: 0.59 MG/DL (ref 0.51–0.95)
DEPRECATED HCO3 PLAS-SCNC: 31 MMOL/L (ref 22–29)
GFR SERPL CREATININE-BSD FRML MDRD: >90 ML/MIN/1.73M2
GLUCOSE SERPL-MCNC: 86 MG/DL (ref 70–99)
POTASSIUM SERPL-SCNC: 4.2 MMOL/L (ref 3.4–5.3)
SODIUM SERPL-SCNC: 142 MMOL/L (ref 136–145)

## 2022-10-27 PROCEDURE — 36415 COLL VENOUS BLD VENIPUNCTURE: CPT | Mod: ORL | Performed by: FAMILY MEDICINE

## 2022-10-27 PROCEDURE — 80048 BASIC METABOLIC PNL TOTAL CA: CPT | Mod: ORL | Performed by: FAMILY MEDICINE

## 2022-10-27 PROCEDURE — 99309 SBSQ NF CARE MODERATE MDM 30: CPT | Performed by: NURSE PRACTITIONER

## 2022-10-27 PROCEDURE — P9603 ONE-WAY ALLOW PRORATED MILES: HCPCS | Mod: ORL | Performed by: FAMILY MEDICINE

## 2022-10-27 PROCEDURE — 87493 C DIFF AMPLIFIED PROBE: CPT | Mod: ORL | Performed by: NURSE PRACTITIONER

## 2022-10-27 RX ORDER — LOPERAMIDE HCL 2 MG
2 CAPSULE ORAL 4 TIMES DAILY PRN
COMMUNITY
End: 2024-07-17

## 2022-10-27 NOTE — PROGRESS NOTES
"[facility] GERIATRIC SERVICES    Code Status:  FULL CODE   Visit Type:   Chief Complaint   Patient presents with     Nursing Home Acute     TCU Follow up     Facility:  [facility] ([facility]) [[id number]]           Transitional Care Course: Mackenzie Brown is a 77 year old female who I am seeing today for follow-up on the TCU.  Patient recently hospitalized on 10/6/2022 at [facility].  Patient had a fall sustaining a C5 vertebral compression fracture and a left humeral fracture.  Past medical history includes generalized anxiety disorder, hypertension, hypothyroidism, obstructive sleep apnea on CPAP, GERD, presence of Watchman left atrial appendage closure device, senile dementia and congestive heart failure.  She was seen by neurology and underwent an MRI of the cervical spine.  Suggestion were to wear c-collar at all times.  She also had post fall tendinitis and dizziness.  Neurology felt this may be possible postconcussive syndrome versus intrinsic ear issue.  Exam negative for palatal muscle myoclonus per the records.  Recommended follow-up with ENT if tinnitus persists.  She also had some associated nausea.  Left humeral fracture.  She was seen by orthopedics and felt it was nonoperative.  She was placed in a sling.  She continues on morphine and Tylenol for pain.  She did develop acute on chronic hypoxia.  She was placed on oxygen.  She continues on this at 2 L.  Chest x-ray showed atelectasis.    On today's visit patient is sitting up in bed. Yesterday pt seen for pain management and follow up. Pt reported \"not feeling\" well. She reported headache and nausea. She has hx of chronic migraines and has been treated in the past with Excedrin.  Patient also with chronic history of nausea.  She takes Zofran at home.  She is continued on this.  She has been afebrile.  She denies any runny nose, sore throat, cough or congestion.  This a.m. she developed loose stools.  Patient was swabbed for COVID which was " negative.  She was also swabbed for influenza AMB which was positive for influenza A.  She has been afebrile.  She has had liquid diarrhea x3 this AM.  We have had some C. difficile on the unit.  We will rule that out first.  Patient reports very poor oral intake.  BMP unremarkable.  Recently her Lasix was resumed.  We will hold this due to poor appetite.  Her pain medication was changed from morphine to tramadol 50 mg every 6 hours as needed.  Pain appears to be adequately controlled.  Overall patient feels weak and fatigued.  Recent C5 fracture.  She had follow-up imaging that reviewed area was healed.  Orders were received to DC her cervical collar.        Active Ambulatory Problems     Diagnosis Date Noted     Adrenal nodule (H) 11/08/2010     PAF (paroxysmal atrial fibrillation) (H) 09/22/2018     AK (actinic keratosis) 12/21/2011     Amnesia 12/14/2020     Blood typing encounter 03/20/2017     Bunion 01/25/2011     Cataracts, both eyes 10/19/2016     Chronic heart failure with preserved ejection fraction (H) 10/12/2019     Chronic back pain 10/29/2009     Chronic headache disorder 12/14/2020     Complications of medical care 10/29/2009     Depression with anxiety 12/14/2020     Diverticular disease of colon 02/16/2015     Diverticulitis 12/14/2020     Diverticulitis of colon 09/19/2010     Eczema 10/05/2010     Fibula fracture 04/27/2015     GERD (gastroesophageal reflux disease) 12/14/2020     History of colonic polyps 01/24/2011     Hypertension 07/28/2015     Insomnia 12/14/2020     Irritable bowel syndrome with diarrhea 10/14/2020     Knee pain, left 10/23/2015     Lactose intolerance 10/14/2020     Major depressive disorder, recurrent episode, mild (H) 11/28/2011     Malignant neoplasm of skin 11/11/2009     Migraine 10/29/2009     Mild cognitive impairment 10/23/2014     Toxic multinodular goiter 02/27/2015     Obesity 12/14/2020     Osteoarthrosis 10/29/2009     Primary osteoarthritis involving multiple  joints 02/22/2017     Senile osteoporosis 04/15/2012     Sensorineural hearing loss, asymmetrical 11/23/2011     Spondylosis of lumbar region without myelopathy or radiculopathy 03/07/2017     Tibial plateau fracture 04/27/2015     Wrist fracture, left 10/28/2011     Neck pain 10/29/2009     Hemorrhage of rectum and anus 07/31/2019     Postoperative hypothyroidism 02/24/2021     Tachy-braydon syndrome (H) 12/07/2021     EVERT on CPAP 02/24/2021     Irregular bowel habits 07/31/2019     Hypoxemia 02/24/2021     Hemorrhoids 01/30/2020     Concussion 01/13/2021     Chest pain 09/01/2021     Change in bowel habits 10/11/2022     B12 deficiency 04/11/2022     Senile dementia, uncomplicated (H) 10/06/2022     Presence of Watchman left atrial appendage closure device 10/06/2022     Closed nondisplaced fracture of fifth cervical vertebra (H) 10/06/2022     Closed fracture of proximal end of humerus 10/06/2022     Acute on chronic respiratory failure with hypoxemia (H) 10/06/2022     Resolved Ambulatory Problems     Diagnosis Date Noted     Pain in limb 05/02/2008     Other postprocedural status(V45.89) 05/02/2008     Ulcer of varicose vein of left leg (H) 01/30/2019     No Additional Past Medical History     Allergies   Allergen Reactions     Demerol [Meperidine] Unknown     Levofloxacin Unknown     Metronidazole Unknown       All Meds and Allergies reviewed in the record at the facility and is the most up-to-date.      Current Outpatient Medications   Medication Sig     aspirin-acetaminophen-caffeine (EXCEDRIN MIGRAINE) 250-250-65 MG tablet Take 1 tablet by mouth daily as needed for headaches     loperamide (IMODIUM) 2 MG capsule Take 2 mg by mouth 4 times daily as needed for diarrhea     acetaminophen (TYLENOL) 500 MG tablet Take 1,000 mg by mouth 4 times daily     amLODIPine (NORVASC) 5 MG tablet Take 5 mg by mouth daily     aspirin (ASA) 325 MG EC tablet Take 1 tablet (325 mg) by mouth daily     buPROPion (WELLBUTRIN SR)  "150 MG 12 hr tablet Take 1 tablet (150 mg) by mouth daily     Calcium-Magnesium-Vitamin D (CITRACAL SLOW RELEASE) 600- MG-MG-UNIT TB24 Take 600 mg by mouth     cholecalciferol 50 MCG (2000 UT) CAPS Take 2,000 Units by mouth     Cyanocobalamin 1000 MCG CAPS Take 1,000 mcg by mouth daily     donepezil (ARICEPT) 10 MG tablet      irbesartan (AVAPRO) 150 MG tablet Take 1 tablet (150 mg) by mouth At Bedtime     levothyroxine (SYNTHROID/LEVOTHROID) 125 MCG tablet Take 1 tablet (125 mcg) by mouth daily     nitroGLYcerin (NITROSTAT) 0.4 MG sublingual tablet Place 1 tablet (0.4 mg) under the tongue every 5 minutes as needed for chest pain For chest pain place 1 tablet under the tongue every 5 minutes for 3 doses. If symptoms persist 5 minutes after 1st dose call 911.     ondansetron (ZOFRAN) 4 MG tablet Take 1 tablet (4 mg) by mouth every 8 hours as needed for nausea     pantoprazole sodium (PROTONIX) 40 MG packet Take 1 packet (40 mg) by mouth daily     PARoxetine (PAXIL) 40 MG tablet Take 1 tablet (40 mg) by mouth every morning     sennosides (SENOKOT) 8.6 MG tablet Take 1 tablet by mouth 2 times daily     traMADol (ULTRAM) 50 MG tablet Take 50 mg by mouth every 6 hours as needed for severe pain     traZODone (DESYREL) 50 MG tablet Take 1 tablet (50 mg) by mouth At Bedtime     No current facility-administered medications for this visit.       REVIEW OF SYSTEMS:   Review of Systems  7 point review of systems reviewed.  Pertinent positives in HPI.    PHYSICAL EXAMINATION:  Physical Exam     Vital signs: BP (!) 167/84   Pulse 73   Temp 96.9  F (36.1  C)   Resp 16   Ht 1.626 m (5' 4\")   Wt 79.6 kg (175 lb 6.4 oz)   SpO2 92%   BMI 30.11 kg/m    General: Awake, appears fatigued, alert, sitting up in bed, conversant  HEENT: Pink conjunctiva, anicteric sclerae, dry oral mucosa  NECK: Patient now out of her cervical collar.  CVS:  S1  S2, without murmur or gallop.   LUNG: Clear to auscultation, No wheezes, rales or " rhonci.  EXTREMITIES: Moves both upper and lower extremities, with limitation to the left upper extremity.  Sling in place.  Positive CMS.   SKIN: Warm and dry  NEUROLOGIC: Intact, pulses palpable  PSYCHIATRIC: Pleasant.      Labs:  All labs reviewed in the nursing home record and Epic   @  Lab Results   Component Value Date    WBC 3.8 10/13/2019    WBC 6.1 02/04/2008     Lab Results   Component Value Date    RBC 4.44 10/13/2019    RBC 4.21 02/04/2008     Lab Results   Component Value Date    HGB 13.4 10/13/2019    HGB 12.5 02/04/2008     Lab Results   Component Value Date    HCT 40.7 10/13/2019    HCT 37.4 02/04/2008     Lab Results   Component Value Date    MCV 92 10/13/2019    MCV 89 02/04/2008     Lab Results   Component Value Date    MCH 30.2 10/13/2019    MCH 29.7 02/04/2008     Lab Results   Component Value Date    MCHC 32.9 10/13/2019    MCHC 33.4 02/04/2008     Lab Results   Component Value Date    RDW 13.9 10/13/2019    RDW 13.2 02/04/2008     Lab Results   Component Value Date     10/13/2019     02/04/2008        @Last Comprehensive Metabolic Panel:  Sodium   Date Value Ref Range Status   10/27/2022 142 136 - 145 mmol/L Final     Potassium   Date Value Ref Range Status   10/27/2022 4.2 3.4 - 5.3 mmol/L Final   10/15/2019 5.0 3.5 - 5.0 mmol/L Final     Chloride   Date Value Ref Range Status   10/27/2022 100 98 - 107 mmol/L Final   10/14/2019 103 98 - 107 mmol/L Final     Carbon Dioxide (CO2)   Date Value Ref Range Status   10/27/2022 31 (H) 22 - 29 mmol/L Final   10/14/2019 28 22 - 31 mmol/L Final     Anion Gap   Date Value Ref Range Status   10/27/2022 11 7 - 15 mmol/L Final   10/14/2019 8 5 - 18 mmol/L Final     Glucose   Date Value Ref Range Status   10/27/2022 86 70 - 99 mg/dL Final   10/14/2019 111 70 - 125 mg/dL Final     Urea Nitrogen   Date Value Ref Range Status   10/27/2022 6.8 (L) 8.0 - 23.0 mg/dL Final   10/14/2019 12 8 - 28 mg/dL Final     Creatinine   Date Value Ref Range Status    10/27/2022 0.59 0.51 - 0.95 mg/dL Final     GFR Estimate   Date Value Ref Range Status   10/27/2022 >90 >60 mL/min/1.73m2 Final     Comment:     Effective December 21, 2021 eGFRcr in adults is calculated using the 2021 CKD-EPI creatinine equation which includes age and gender (Kacy et al., NEJ, DOI: 10.1056/FXVIaq6218012)   10/14/2019 >60 >60 mL/min/1.73m2 Final     Calcium   Date Value Ref Range Status   10/27/2022 8.9 8.8 - 10.2 mg/dL Final     Assessment/plan:       ICD-10-CM    1. Influenza A  J10.1       2. Nausea  R11.0       3. Diarrhea, unspecified type  R19.7       4. Episodic cluster headache, not intractable  G44.019         Influenza swab positive for a today.  Start Tamiflu 75 mg p.o. twice daily x5 days.  Encourage fluids.  BMP today unremarkable.  Stop Lasix due to poor oral intake.  Resume Excedrin she was taking this at home previously for history of migraines.  Diarrhea.  Rule out C. difficile due to recent cases on the TCU.  If negative start loperamide 2 mg 4 times daily as needed.  Continue Zofran for nausea.    This note has been dictated using voice recognition software. Any grammatical or context distortions are unintentional and inherent to the software    Electronically signed by: Marni May, CNP

## 2022-10-27 NOTE — PROGRESS NOTES
HEALTH GERIATRIC SERVICES    Code Status:  FULL CODE   Visit Type:   Chief Complaint   Patient presents with     Nursing Home Acute     TCU Follow up     Facility:  [facility] ([facility]) [[id number]]           Transitional Care Course: Mackenzie Brown is a 77 year old female who I am seeing today for follow-up on the TCU.  Patient recently hospitalized on 10/6/2022 at [facility].  Patient had a fall sustaining a C5 vertebral compression fracture and a left humeral fracture.  Past medical history includes generalized anxiety disorder, hypertension, hypothyroidism, obstructive sleep apnea on CPAP, GERD, presence of Watchman left atrial appendage closure device, senile dementia and congestive heart failure.  She was seen by neurology and underwent an MRI of the cervical spine.  Suggestion were to wear c-collar at all times.  She also had post fall tendinitis and dizziness.  Neurology felt this may be possible postconcussive syndrome versus intrinsic ear issue.  Exam negative for palatal muscle myoclonus per the records.  Recommended follow-up with ENT if tinnitus persists.  She also had some associated nausea.  Left humeral fracture.  She was seen by orthopedics and felt it was nonoperative.  She was placed in a sling.  She continues on morphine and Tylenol for pain.  She did develop acute on chronic hypoxia.  She was placed on oxygen.  She continues on this at 2 L.  Chest x-ray showed atelectasis.    On today's visit patient is sitting up in bed. Patient with a recent C5 vertebral compression fracture and left humeral fracture. 2 days prior she had a follow up with ortho and her Morphine had been reduced to 2 mg however this dosage is unavailable so she was placed reduced to 7.5 mg. I was phoned later in the evening in regards to increased pain and request for more pain medicine. She was given 15 mg last evening. Today she is very upset about her pain meds being reduced. Her daughter is present on exam.  Ortho notes reviewed. Pt very adamant she needs 15 mg. Review of the records show she is only recieivng pain meds about 3 times daily. She has hx of recurrent falls and has had some memory impairment related to narcotics. I talked at length about attempting to reduce meds as well as trying Tramadol instead. Pt has taken Tramadol in the past and tolerated well. Pt very anxious about changing meds. She has had some ongoing nausea in which she is taking Zofran. This am her bp was slightly elevated. She is also experiencing dry mouth. Her daughter is in agreement in trialing Tramadol. OT also present during exam and reported to pt they will initiate US therapy to her Left arm for pain modalities.     Active Ambulatory Problems     Diagnosis Date Noted     Adrenal nodule (H) 11/08/2010     PAF (paroxysmal atrial fibrillation) (H) 09/22/2018     AK (actinic keratosis) 12/21/2011     Amnesia 12/14/2020     Blood typing encounter 03/20/2017     Bunion 01/25/2011     Cataracts, both eyes 10/19/2016     Chronic heart failure with preserved ejection fraction (H) 10/12/2019     Chronic back pain 10/29/2009     Chronic headache disorder 12/14/2020     Complications of medical care 10/29/2009     Depression with anxiety 12/14/2020     Diverticular disease of colon 02/16/2015     Diverticulitis 12/14/2020     Diverticulitis of colon 09/19/2010     Eczema 10/05/2010     Fibula fracture 04/27/2015     GERD (gastroesophageal reflux disease) 12/14/2020     History of colonic polyps 01/24/2011     Hypertension 07/28/2015     Insomnia 12/14/2020     Irritable bowel syndrome with diarrhea 10/14/2020     Knee pain, left 10/23/2015     Lactose intolerance 10/14/2020     Major depressive disorder, recurrent episode, mild (H) 11/28/2011     Malignant neoplasm of skin 11/11/2009     Migraine 10/29/2009     Mild cognitive impairment 10/23/2014     Toxic multinodular goiter 02/27/2015     Obesity 12/14/2020     Osteoarthrosis 10/29/2009      Primary osteoarthritis involving multiple joints 02/22/2017     Senile osteoporosis 04/15/2012     Sensorineural hearing loss, asymmetrical 11/23/2011     Spondylosis of lumbar region without myelopathy or radiculopathy 03/07/2017     Tibial plateau fracture 04/27/2015     Wrist fracture, left 10/28/2011     Neck pain 10/29/2009     Hemorrhage of rectum and anus 07/31/2019     Postoperative hypothyroidism 02/24/2021     Tachy-braydon syndrome (H) 12/07/2021     EVERT on CPAP 02/24/2021     Irregular bowel habits 07/31/2019     Hypoxemia 02/24/2021     Hemorrhoids 01/30/2020     Concussion 01/13/2021     Chest pain 09/01/2021     Change in bowel habits 10/11/2022     B12 deficiency 04/11/2022     Senile dementia, uncomplicated (H) 10/06/2022     Presence of Watchman left atrial appendage closure device 10/06/2022     Closed nondisplaced fracture of fifth cervical vertebra (H) 10/06/2022     Closed fracture of proximal end of humerus 10/06/2022     Acute on chronic respiratory failure with hypoxemia (H) 10/06/2022     Resolved Ambulatory Problems     Diagnosis Date Noted     Pain in limb 05/02/2008     Other postprocedural status(V45.89) 05/02/2008     Ulcer of varicose vein of left leg (H) 01/30/2019     No Additional Past Medical History     Allergies   Allergen Reactions     Demerol [Meperidine] Unknown     Levofloxacin Unknown     Metronidazole Unknown       All Meds and Allergies reviewed in the record at the facility and is the most up-to-date.      Current Outpatient Medications   Medication Sig     traMADol (ULTRAM) 50 MG tablet Take 50 mg by mouth every 6 hours as needed for severe pain     acetaminophen (TYLENOL) 500 MG tablet Take 1,000 mg by mouth 4 times daily     amLODIPine (NORVASC) 5 MG tablet Take 5 mg by mouth daily     aspirin (ASA) 325 MG EC tablet Take 1 tablet (325 mg) by mouth daily     buPROPion (WELLBUTRIN SR) 150 MG 12 hr tablet Take 1 tablet (150 mg) by mouth daily     Calcium-Magnesium-Vitamin  "D (CITRACAL SLOW RELEASE) 600- MG-MG-UNIT TB24 Take 600 mg by mouth     cholecalciferol 50 MCG (2000 UT) CAPS Take 2,000 Units by mouth     Cyanocobalamin 1000 MCG CAPS Take 1,000 mcg by mouth daily     donepezil (ARICEPT) 10 MG tablet      furosemide (LASIX) 20 MG tablet Take 20 mg by mouth every morning     irbesartan (AVAPRO) 150 MG tablet Take 1 tablet (150 mg) by mouth At Bedtime     levothyroxine (SYNTHROID/LEVOTHROID) 125 MCG tablet Take 1 tablet (125 mcg) by mouth daily     nitroGLYcerin (NITROSTAT) 0.4 MG sublingual tablet Place 1 tablet (0.4 mg) under the tongue every 5 minutes as needed for chest pain For chest pain place 1 tablet under the tongue every 5 minutes for 3 doses. If symptoms persist 5 minutes after 1st dose call 911.     ondansetron (ZOFRAN) 4 MG tablet Take 1 tablet (4 mg) by mouth every 8 hours as needed for nausea     pantoprazole sodium (PROTONIX) 40 MG packet Take 1 packet (40 mg) by mouth daily     PARoxetine (PAXIL) 40 MG tablet Take 1 tablet (40 mg) by mouth every morning     sennosides (SENOKOT) 8.6 MG tablet Take 1 tablet by mouth 2 times daily     traZODone (DESYREL) 50 MG tablet Take 1 tablet (50 mg) by mouth At Bedtime     No current facility-administered medications for this visit.       REVIEW OF SYSTEMS:   Review of Systems  7 point review of systems reviewed.  Pertinent positives in HPI.    PHYSICAL EXAMINATION:  Physical Exam     Vital signs: BP (!) 175/78   Pulse 69   Temp 97.9  F (36.6  C)   Resp 20   Ht 1.626 m (5' 4\")   Wt 79.6 kg (175 lb 6.4 oz)   SpO2 91%   BMI 30.11 kg/m    General: Awake, Alert, sitting up in bed, conversant  HEENT: Pink conjunctiva, anicteric sclerae, dry oral mucosa  NECK: Continues in cervical collar.  Sheepskin underneath.  CVS:  S1  S2, without murmur or gallop.   LUNG: Clear to auscultation, No wheezes, rales or rhonci.  EXTREMITIES: Moves both upper and lower extremities, with limitation to the left upper extremity.  Sling in " place.  Positive CMS.   SKIN: Warm and dry  NEUROLOGIC: Intact, pulses palpable  PSYCHIATRIC: Anxious, upset       Labs:  All labs reviewed in the nursing home record and Epic   @  Lab Results   Component Value Date    WBC 3.8 10/13/2019    WBC 6.1 02/04/2008     Lab Results   Component Value Date    RBC 4.44 10/13/2019    RBC 4.21 02/04/2008     Lab Results   Component Value Date    HGB 13.4 10/13/2019    HGB 12.5 02/04/2008     Lab Results   Component Value Date    HCT 40.7 10/13/2019    HCT 37.4 02/04/2008     Lab Results   Component Value Date    MCV 92 10/13/2019    MCV 89 02/04/2008     Lab Results   Component Value Date    MCH 30.2 10/13/2019    MCH 29.7 02/04/2008     Lab Results   Component Value Date    MCHC 32.9 10/13/2019    MCHC 33.4 02/04/2008     Lab Results   Component Value Date    RDW 13.9 10/13/2019    RDW 13.2 02/04/2008     Lab Results   Component Value Date     10/13/2019     02/04/2008        @Last Comprehensive Metabolic Panel:  Sodium   Date Value Ref Range Status   10/17/2022 143 136 - 145 mmol/L Final     Potassium   Date Value Ref Range Status   10/17/2022 4.3 3.4 - 5.3 mmol/L Final   10/15/2019 5.0 3.5 - 5.0 mmol/L Final     Chloride   Date Value Ref Range Status   10/17/2022 99 98 - 107 mmol/L Final   10/14/2019 103 98 - 107 mmol/L Final     Carbon Dioxide (CO2)   Date Value Ref Range Status   10/17/2022 36 (H) 22 - 29 mmol/L Final   10/14/2019 28 22 - 31 mmol/L Final     Anion Gap   Date Value Ref Range Status   10/17/2022 8 7 - 15 mmol/L Final   10/14/2019 8 5 - 18 mmol/L Final     Glucose   Date Value Ref Range Status   10/17/2022 89 70 - 99 mg/dL Final   10/14/2019 111 70 - 125 mg/dL Final     Urea Nitrogen   Date Value Ref Range Status   10/17/2022 7.5 (L) 8.0 - 23.0 mg/dL Final   10/14/2019 12 8 - 28 mg/dL Final     Creatinine   Date Value Ref Range Status   10/17/2022 0.64 0.51 - 0.95 mg/dL Final     GFR Estimate   Date Value Ref Range Status   10/17/2022 89 >60  mL/min/1.73m2 Final     Comment:     Effective December 21, 2021 eGFRcr in adults is calculated using the 2021 CKD-EPI creatinine equation which includes age and gender (Kacy et al., NEJ, DOI: 10.1056/WHASgq3556982)   10/14/2019 >60 >60 mL/min/1.73m2 Final     Calcium   Date Value Ref Range Status   10/17/2022 9.0 8.8 - 10.2 mg/dL Final     Assessment/plan:       ICD-10-CM    1. Compression fracture of C5 vertebra with routine healing, subsequent encounter  S12.490D       2. Closed fracture of proximal end of left humerus with routine healing, unspecified fracture morphology, subsequent encounter  S42.202D       3. Pain management  R52         Recent reduction in Morphine to 7.5 mg.  Pt reporting increased pain. Discontinue Morphine. Start Tramadol 50 mg Q 6 hours prn. Continue tylenol 1000 mg Q 6 hour. Nursing to complete pain assessments Q 6 hours. Therapy to start US to left arm for pain modalities. Pt received info that she no longer has to wear her cervical collar.     Total time spent for this visit was 35 minutes greater than 50% of time spent face-to-face with patient and her daughter reviewing pain medications, risk and benefits as well as above plan of care.     This note has been dictated using voice recognition software. Any grammatical or context distortions are unintentional and inherent to the software    Electronically signed by: Marni May CNP

## 2022-10-27 NOTE — LETTER
"    10/27/2022        RE: Mackenzie Brown  [address]  [facility] [id number]        [facility]    Code Status:  FULL CODE   Visit Type:   Chief Complaint   Patient presents with     Nursing Home Acute     TCU Follow up     Facility:  [facility] ([facility]) [[id number]]           Transitional Care Course: Mackenzie Brown is a 77 year old female who I am seeing today for follow-up on the TCU.  Patient recently hospitalized on 10/6/2022 at [facility].  Patient had a fall sustaining a C5 vertebral compression fracture and a left humeral fracture.  Past medical history includes generalized anxiety disorder, hypertension, hypothyroidism, obstructive sleep apnea on CPAP, GERD, presence of Watchman left atrial appendage closure device, senile dementia and congestive heart failure.  She was seen by neurology and underwent an MRI of the cervical spine.  Suggestion were to wear c-collar at all times.  She also had post fall tendinitis and dizziness.  Neurology felt this may be possible postconcussive syndrome versus intrinsic ear issue.  Exam negative for palatal muscle myoclonus per the records.  Recommended follow-up with ENT if tinnitus persists.  She also had some associated nausea.  Left humeral fracture.  She was seen by orthopedics and felt it was nonoperative.  She was placed in a sling.  She continues on morphine and Tylenol for pain.  She did develop acute on chronic hypoxia.  She was placed on oxygen.  She continues on this at 2 L.  Chest x-ray showed atelectasis.    On today's visit patient is sitting up in bed. Yesterday pt seen for pain management and follow up. Pt reported \"not feeling\" well. She reported headache and nausea. She has hx of chronic migraines and has been treated in the past with Excedrin.  Patient also with chronic history of nausea.  She takes Zofran at home.  She is continued on this.  She has been afebrile.  She denies any runny nose, sore throat, cough or " congestion.  This a.m. she developed loose stools.  Patient was swabbed for COVID which was negative.  She was also swabbed for influenza AMB which was positive for influenza A.  She has been afebrile.  She has had liquid diarrhea x3 this AM.  We have had some C. difficile on the unit.  We will rule that out first.  Patient reports very poor oral intake.  BMP unremarkable.  Recently her Lasix was resumed.  We will hold this due to poor appetite.  Her pain medication was changed from morphine to tramadol 50 mg every 6 hours as needed.  Pain appears to be adequately controlled.  Overall patient feels weak and fatigued.  Recent C5 fracture.  She had follow-up imaging that reviewed area was healed.  Orders were received to DC her cervical collar.        Active Ambulatory Problems     Diagnosis Date Noted     Adrenal nodule (H) 11/08/2010     PAF (paroxysmal atrial fibrillation) (H) 09/22/2018     AK (actinic keratosis) 12/21/2011     Amnesia 12/14/2020     Blood typing encounter 03/20/2017     Bunion 01/25/2011     Cataracts, both eyes 10/19/2016     Chronic heart failure with preserved ejection fraction (H) 10/12/2019     Chronic back pain 10/29/2009     Chronic headache disorder 12/14/2020     Complications of medical care 10/29/2009     Depression with anxiety 12/14/2020     Diverticular disease of colon 02/16/2015     Diverticulitis 12/14/2020     Diverticulitis of colon 09/19/2010     Eczema 10/05/2010     Fibula fracture 04/27/2015     GERD (gastroesophageal reflux disease) 12/14/2020     History of colonic polyps 01/24/2011     Hypertension 07/28/2015     Insomnia 12/14/2020     Irritable bowel syndrome with diarrhea 10/14/2020     Knee pain, left 10/23/2015     Lactose intolerance 10/14/2020     Major depressive disorder, recurrent episode, mild (H) 11/28/2011     Malignant neoplasm of skin 11/11/2009     Migraine 10/29/2009     Mild cognitive impairment 10/23/2014     Toxic multinodular goiter 02/27/2015      Obesity 12/14/2020     Osteoarthrosis 10/29/2009     Primary osteoarthritis involving multiple joints 02/22/2017     Senile osteoporosis 04/15/2012     Sensorineural hearing loss, asymmetrical 11/23/2011     Spondylosis of lumbar region without myelopathy or radiculopathy 03/07/2017     Tibial plateau fracture 04/27/2015     Wrist fracture, left 10/28/2011     Neck pain 10/29/2009     Hemorrhage of rectum and anus 07/31/2019     Postoperative hypothyroidism 02/24/2021     Tachy-braydon syndrome (H) 12/07/2021     EVERT on CPAP 02/24/2021     Irregular bowel habits 07/31/2019     Hypoxemia 02/24/2021     Hemorrhoids 01/30/2020     Concussion 01/13/2021     Chest pain 09/01/2021     Change in bowel habits 10/11/2022     B12 deficiency 04/11/2022     Senile dementia, uncomplicated (H) 10/06/2022     Presence of Watchman left atrial appendage closure device 10/06/2022     Closed nondisplaced fracture of fifth cervical vertebra (H) 10/06/2022     Closed fracture of proximal end of humerus 10/06/2022     Acute on chronic respiratory failure with hypoxemia (H) 10/06/2022     Resolved Ambulatory Problems     Diagnosis Date Noted     Pain in limb 05/02/2008     Other postprocedural status(V45.89) 05/02/2008     Ulcer of varicose vein of left leg (H) 01/30/2019     No Additional Past Medical History     Allergies   Allergen Reactions     Demerol [Meperidine] Unknown     Levofloxacin Unknown     Metronidazole Unknown       All Meds and Allergies reviewed in the record at the facility and is the most up-to-date.      Current Outpatient Medications   Medication Sig     aspirin-acetaminophen-caffeine (EXCEDRIN MIGRAINE) 250-250-65 MG tablet Take 1 tablet by mouth daily as needed for headaches     loperamide (IMODIUM) 2 MG capsule Take 2 mg by mouth 4 times daily as needed for diarrhea     acetaminophen (TYLENOL) 500 MG tablet Take 1,000 mg by mouth 4 times daily     amLODIPine (NORVASC) 5 MG tablet Take 5 mg by mouth daily      "aspirin (ASA) 325 MG EC tablet Take 1 tablet (325 mg) by mouth daily     buPROPion (WELLBUTRIN SR) 150 MG 12 hr tablet Take 1 tablet (150 mg) by mouth daily     Calcium-Magnesium-Vitamin D (CITRACAL SLOW RELEASE) 600- MG-MG-UNIT TB24 Take 600 mg by mouth     cholecalciferol 50 MCG (2000 UT) CAPS Take 2,000 Units by mouth     Cyanocobalamin 1000 MCG CAPS Take 1,000 mcg by mouth daily     donepezil (ARICEPT) 10 MG tablet      irbesartan (AVAPRO) 150 MG tablet Take 1 tablet (150 mg) by mouth At Bedtime     levothyroxine (SYNTHROID/LEVOTHROID) 125 MCG tablet Take 1 tablet (125 mcg) by mouth daily     nitroGLYcerin (NITROSTAT) 0.4 MG sublingual tablet Place 1 tablet (0.4 mg) under the tongue every 5 minutes as needed for chest pain For chest pain place 1 tablet under the tongue every 5 minutes for 3 doses. If symptoms persist 5 minutes after 1st dose call 911.     ondansetron (ZOFRAN) 4 MG tablet Take 1 tablet (4 mg) by mouth every 8 hours as needed for nausea     pantoprazole sodium (PROTONIX) 40 MG packet Take 1 packet (40 mg) by mouth daily     PARoxetine (PAXIL) 40 MG tablet Take 1 tablet (40 mg) by mouth every morning     sennosides (SENOKOT) 8.6 MG tablet Take 1 tablet by mouth 2 times daily     traMADol (ULTRAM) 50 MG tablet Take 50 mg by mouth every 6 hours as needed for severe pain     traZODone (DESYREL) 50 MG tablet Take 1 tablet (50 mg) by mouth At Bedtime     No current facility-administered medications for this visit.       REVIEW OF SYSTEMS:   Review of Systems  7 point review of systems reviewed.  Pertinent positives in HPI.    PHYSICAL EXAMINATION:  Physical Exam     Vital signs: BP (!) 167/84   Pulse 73   Temp 96.9  F (36.1  C)   Resp 16   Ht 1.626 m (5' 4\")   Wt 79.6 kg (175 lb 6.4 oz)   SpO2 92%   BMI 30.11 kg/m    General: Awake, appears fatigued, alert, sitting up in bed, conversant  HEENT: Pink conjunctiva, anicteric sclerae, dry oral mucosa  NECK: Patient now out of her cervical " collar.  CVS:  S1  S2, without murmur or gallop.   LUNG: Clear to auscultation, No wheezes, rales or rhonci.  EXTREMITIES: Moves both upper and lower extremities, with limitation to the left upper extremity.  Sling in place.  Positive CMS.   SKIN: Warm and dry  NEUROLOGIC: Intact, pulses palpable  PSYCHIATRIC: Pleasant.      Labs:  All labs reviewed in the nursing home record and Epic   @  Lab Results   Component Value Date    WBC 3.8 10/13/2019    WBC 6.1 02/04/2008     Lab Results   Component Value Date    RBC 4.44 10/13/2019    RBC 4.21 02/04/2008     Lab Results   Component Value Date    HGB 13.4 10/13/2019    HGB 12.5 02/04/2008     Lab Results   Component Value Date    HCT 40.7 10/13/2019    HCT 37.4 02/04/2008     Lab Results   Component Value Date    MCV 92 10/13/2019    MCV 89 02/04/2008     Lab Results   Component Value Date    MCH 30.2 10/13/2019    MCH 29.7 02/04/2008     Lab Results   Component Value Date    MCHC 32.9 10/13/2019    MCHC 33.4 02/04/2008     Lab Results   Component Value Date    RDW 13.9 10/13/2019    RDW 13.2 02/04/2008     Lab Results   Component Value Date     10/13/2019     02/04/2008        @Last Comprehensive Metabolic Panel:  Sodium   Date Value Ref Range Status   10/27/2022 142 136 - 145 mmol/L Final     Potassium   Date Value Ref Range Status   10/27/2022 4.2 3.4 - 5.3 mmol/L Final   10/15/2019 5.0 3.5 - 5.0 mmol/L Final     Chloride   Date Value Ref Range Status   10/27/2022 100 98 - 107 mmol/L Final   10/14/2019 103 98 - 107 mmol/L Final     Carbon Dioxide (CO2)   Date Value Ref Range Status   10/27/2022 31 (H) 22 - 29 mmol/L Final   10/14/2019 28 22 - 31 mmol/L Final     Anion Gap   Date Value Ref Range Status   10/27/2022 11 7 - 15 mmol/L Final   10/14/2019 8 5 - 18 mmol/L Final     Glucose   Date Value Ref Range Status   10/27/2022 86 70 - 99 mg/dL Final   10/14/2019 111 70 - 125 mg/dL Final     Urea Nitrogen   Date Value Ref Range Status   10/27/2022 6.8 (L)  8.0 - 23.0 mg/dL Final   10/14/2019 12 8 - 28 mg/dL Final     Creatinine   Date Value Ref Range Status   10/27/2022 0.59 0.51 - 0.95 mg/dL Final     GFR Estimate   Date Value Ref Range Status   10/27/2022 >90 >60 mL/min/1.73m2 Final     Comment:     Effective December 21, 2021 eGFRcr in adults is calculated using the 2021 CKD-EPI creatinine equation which includes age and gender (Kacy et al., NE, DOI: 10.1056/JWPTod6635059)   10/14/2019 >60 >60 mL/min/1.73m2 Final     Calcium   Date Value Ref Range Status   10/27/2022 8.9 8.8 - 10.2 mg/dL Final     Assessment/plan:       ICD-10-CM    1. Influenza A  J10.1       2. Nausea  R11.0       3. Diarrhea, unspecified type  R19.7       4. Episodic cluster headache, not intractable  G44.019         Influenza swab positive for a today.  Start Tamiflu 75 mg p.o. twice daily x5 days.  Encourage fluids.  BMP today unremarkable.  Stop Lasix due to poor oral intake.  Resume Excedrin she was taking this at home previously for history of migraines.  Diarrhea.  Rule out C. difficile due to recent cases on the TCU.  If negative start loperamide 2 mg 4 times daily as needed.  Continue Zofran for nausea.    This note has been dictated using voice recognition software. Any grammatical or context distortions are unintentional and inherent to the software    Electronically signed by: Marni May CNP           Sincerely,        Marni May NP

## 2022-10-27 NOTE — PROGRESS NOTES
[facility]    Code Status:  FULL CODE   Visit Type:   Chief Complaint   Patient presents with     Nursing Home Acute     TCU Follow up     Facility:  [facility] ([facility]) [[id number]]           Transitional Care Course: Mackenzie Brown is a 77 year old female who I am seeing today for follow-up ont TCU.  Patient recently hospitalized on 10/6/2022 at [facility].  Patient had a fall sustaining a C5 vertebral compression fracture and a left humeral fracture.  Past medical history includes generalized anxiety disorder, hypertension, hypothyroidism, obstructive sleep apnea on CPAP, GERD, presence of Watchman left atrial appendage closure device, senile dementia and congestive heart failure.  She was seen by neurology and underwent an MRI of the cervical spine.  Suggestion were to wear c-collar at all times.  She also had post fall tendinitis and dizziness.  Neurology felt this may be possible postconcussive syndrome versus intrinsic ear issue.  Exam negative for palatal muscle myoclonus per the records.  Recommended follow-up with ENT if tinnitus persists.  She also had some associated nausea.  Left humeral fracture.  She was seen by orthopedics and felt it was nonoperative.  She was placed in a sling.  She continues on morphine and Tylenol for pain.  She did develop acute on chronic hypoxia.  She was placed on oxygen.  She continues on this at 2 L.  Chest x-ray showed atelectasis.    On today's visit patient is sitting up in bed.  Patient with a recent C5 vertebral compression fracture.  She underwent MRI of her neck.  She had a follow-up with neurosurgery.  We are awaiting orders and recommendations.  Patient returned without any information.  Nursing had placed a call.  She continues in cervical collar.  She continues on Tylenol and low-dose morphine for pain.  Recent left humeral fracture.  She continues in splinting.  She had a follow-up with Ortho.  Per the consult notes her morphine was  reduced to 2 mg.  There is no 2 mg tablet.  I did discuss with patient.  She is anticipating discharge early next week.  We will decrease morphine to 7.5 mg and attempt to wean off prior to her discharge.  She has had some periods of increased confusion.  Her daughter wishes to wean her off as soon as possible.  She continues with some ongoing nausea however she reports this was present prior to hospitalization.  She takes as needed Zofran at home and is continued on this at the TCU.  She did have increased nausea on today's visit.  Noted underlying anxiety.  Patient previously on oxygen.  She has been weaned off.  She does have underlying CHF.  She denies any increased shortness of breath or chest pain.  Recent weight gain of about 5 pounds in 2 days.  Her Lasix was resumed at 10 mg daily.  Hypertension.  Blood pressure satisfactory.      Active Ambulatory Problems     Diagnosis Date Noted     Adrenal nodule (H) 11/08/2010     PAF (paroxysmal atrial fibrillation) (H) 09/22/2018     AK (actinic keratosis) 12/21/2011     Amnesia 12/14/2020     Blood typing encounter 03/20/2017     Bunion 01/25/2011     Cataracts, both eyes 10/19/2016     Chronic heart failure with preserved ejection fraction (H) 10/12/2019     Chronic back pain 10/29/2009     Chronic headache disorder 12/14/2020     Complications of medical care 10/29/2009     Depression with anxiety 12/14/2020     Diverticular disease of colon 02/16/2015     Diverticulitis 12/14/2020     Diverticulitis of colon 09/19/2010     Eczema 10/05/2010     Fibula fracture 04/27/2015     GERD (gastroesophageal reflux disease) 12/14/2020     History of colonic polyps 01/24/2011     Hypertension 07/28/2015     Insomnia 12/14/2020     Irritable bowel syndrome with diarrhea 10/14/2020     Knee pain, left 10/23/2015     Lactose intolerance 10/14/2020     Major depressive disorder, recurrent episode, mild (H) 11/28/2011     Malignant neoplasm of skin 11/11/2009     Migraine  10/29/2009     Mild cognitive impairment 10/23/2014     Toxic multinodular goiter 02/27/2015     Obesity 12/14/2020     Osteoarthrosis 10/29/2009     Primary osteoarthritis involving multiple joints 02/22/2017     Senile osteoporosis 04/15/2012     Sensorineural hearing loss, asymmetrical 11/23/2011     Spondylosis of lumbar region without myelopathy or radiculopathy 03/07/2017     Tibial plateau fracture 04/27/2015     Wrist fracture, left 10/28/2011     Neck pain 10/29/2009     Hemorrhage of rectum and anus 07/31/2019     Postoperative hypothyroidism 02/24/2021     Tachy-braydon syndrome (H) 12/07/2021     EVERT on CPAP 02/24/2021     Irregular bowel habits 07/31/2019     Hypoxemia 02/24/2021     Hemorrhoids 01/30/2020     Concussion 01/13/2021     Chest pain 09/01/2021     Change in bowel habits 10/11/2022     B12 deficiency 04/11/2022     Senile dementia, uncomplicated (H) 10/06/2022     Presence of Watchman left atrial appendage closure device 10/06/2022     Closed nondisplaced fracture of fifth cervical vertebra (H) 10/06/2022     Closed fracture of proximal end of humerus 10/06/2022     Acute on chronic respiratory failure with hypoxemia (H) 10/06/2022     Resolved Ambulatory Problems     Diagnosis Date Noted     Pain in limb 05/02/2008     Other postprocedural status(V45.89) 05/02/2008     Ulcer of varicose vein of left leg (H) 01/30/2019     No Additional Past Medical History     Allergies   Allergen Reactions     Demerol [Meperidine] Unknown     Levofloxacin Unknown     Metronidazole Unknown       All Meds and Allergies reviewed in the record at the facility and is the most up-to-date.      Current Outpatient Medications   Medication Sig     acetaminophen (TYLENOL) 500 MG tablet Take 1,000 mg by mouth 4 times daily     amLODIPine (NORVASC) 5 MG tablet Take 5 mg by mouth daily     aspirin (ASA) 325 MG EC tablet Take 1 tablet (325 mg) by mouth daily     buPROPion (WELLBUTRIN SR) 150 MG 12 hr tablet Take 1 tablet  "(150 mg) by mouth daily     Calcium-Magnesium-Vitamin D (CITRACAL SLOW RELEASE) 600- MG-MG-UNIT TB24 Take 600 mg by mouth     cholecalciferol 50 MCG (2000 UT) CAPS Take 2,000 Units by mouth     Cyanocobalamin 1000 MCG CAPS Take 1,000 mcg by mouth daily     donepezil (ARICEPT) 10 MG tablet      furosemide (LASIX) 20 MG tablet Take 20 mg by mouth every morning     irbesartan (AVAPRO) 150 MG tablet Take 1 tablet (150 mg) by mouth At Bedtime     levothyroxine (SYNTHROID/LEVOTHROID) 125 MCG tablet Take 1 tablet (125 mcg) by mouth daily     morphine (MSIR) 15 MG IR tablet Take 1 tablet (15 mg) by mouth every 6 hours as needed for severe pain (Patient taking differently: Take 15 mg by mouth every 6 hours as needed for severe pain Give half tab to equal 7.5 mg every 6 hours as needed)     nitroGLYcerin (NITROSTAT) 0.4 MG sublingual tablet Place 1 tablet (0.4 mg) under the tongue every 5 minutes as needed for chest pain For chest pain place 1 tablet under the tongue every 5 minutes for 3 doses. If symptoms persist 5 minutes after 1st dose call 911.     ondansetron (ZOFRAN) 4 MG tablet Take 1 tablet (4 mg) by mouth every 8 hours as needed for nausea     pantoprazole sodium (PROTONIX) 40 MG packet Take 1 packet (40 mg) by mouth daily     PARoxetine (PAXIL) 40 MG tablet Take 1 tablet (40 mg) by mouth every morning     sennosides (SENOKOT) 8.6 MG tablet Take 1 tablet by mouth 2 times daily     traZODone (DESYREL) 50 MG tablet Take 1 tablet (50 mg) by mouth At Bedtime     No current facility-administered medications for this visit.       REVIEW OF SYSTEMS:   Review of Systems  10 point review of systems reviewed.  Pertinent positives in HPI.    PHYSICAL EXAMINATION:  Physical Exam     Vital signs: /63   Pulse 90   Temp 97.9  F (36.6  C)   Resp 20   Ht 1.626 m (5' 4\")   Wt 79.8 kg (176 lb)   SpO2 94%   BMI 30.21 kg/m    General: Awake, Alert, sitting up in bed, conversant  HEENT: Pink conjunctiva, anicteric " sclerae, dry oral mucosa  NECK: Continues in cervical collar.  Sheepskin underneath.  CVS:  S1  S2, without murmur or gallop.   LUNG: Clear to auscultation, No wheezes, rales or rhonci.  EXTREMITIES: Moves both upper and lower extremities, with limitation to the left upper extremity.  Sling in place.  Positive CMS.   SKIN: Warm and dry  NEUROLOGIC: Intact, pulses palpable  PSYCHIATRIC: Flat affect.      Labs:  All labs reviewed in the nursing home record and Epic   @  Lab Results   Component Value Date    WBC 3.8 10/13/2019    WBC 6.1 02/04/2008     Lab Results   Component Value Date    RBC 4.44 10/13/2019    RBC 4.21 02/04/2008     Lab Results   Component Value Date    HGB 13.4 10/13/2019    HGB 12.5 02/04/2008     Lab Results   Component Value Date    HCT 40.7 10/13/2019    HCT 37.4 02/04/2008     Lab Results   Component Value Date    MCV 92 10/13/2019    MCV 89 02/04/2008     Lab Results   Component Value Date    MCH 30.2 10/13/2019    MCH 29.7 02/04/2008     Lab Results   Component Value Date    MCHC 32.9 10/13/2019    MCHC 33.4 02/04/2008     Lab Results   Component Value Date    RDW 13.9 10/13/2019    RDW 13.2 02/04/2008     Lab Results   Component Value Date     10/13/2019     02/04/2008        @Last Comprehensive Metabolic Panel:  Sodium   Date Value Ref Range Status   10/17/2022 143 136 - 145 mmol/L Final     Potassium   Date Value Ref Range Status   10/17/2022 4.3 3.4 - 5.3 mmol/L Final   10/15/2019 5.0 3.5 - 5.0 mmol/L Final     Chloride   Date Value Ref Range Status   10/17/2022 99 98 - 107 mmol/L Final   10/14/2019 103 98 - 107 mmol/L Final     Carbon Dioxide (CO2)   Date Value Ref Range Status   10/17/2022 36 (H) 22 - 29 mmol/L Final   10/14/2019 28 22 - 31 mmol/L Final     Anion Gap   Date Value Ref Range Status   10/17/2022 8 7 - 15 mmol/L Final   10/14/2019 8 5 - 18 mmol/L Final     Glucose   Date Value Ref Range Status   10/17/2022 89 70 - 99 mg/dL Final   10/14/2019 111 70 - 125  mg/dL Final     Urea Nitrogen   Date Value Ref Range Status   10/17/2022 7.5 (L) 8.0 - 23.0 mg/dL Final   10/14/2019 12 8 - 28 mg/dL Final     Creatinine   Date Value Ref Range Status   10/17/2022 0.64 0.51 - 0.95 mg/dL Final     GFR Estimate   Date Value Ref Range Status   10/17/2022 89 >60 mL/min/1.73m2 Final     Comment:     Effective December 21, 2021 eGFRcr in adults is calculated using the 2021 CKD-EPI creatinine equation which includes age and gender (Kacy et al., NEJ, DOI: 10.1056/XCXTwd5388832)   10/14/2019 >60 >60 mL/min/1.73m2 Final     Calcium   Date Value Ref Range Status   10/17/2022 9.0 8.8 - 10.2 mg/dL Final     Assessment/plan:       ICD-10-CM    1. Compression fracture of C5 vertebra with routine healing, subsequent encounter  S12.490D  X-rays taken during her Ortho visit yesterday.  Awaiting results and recommendations.  Continues in cervical collar.      2. Closed fracture of proximal end of left humerus with routine healing, unspecified fracture morphology, subsequent encounter  S42.202D  Continue to be nonweightbearing.  She is in a sling.  Pain medication reduced during Ortho appointment to 2 mg.  However morphine does not come in a 2 mg tablet.  Currently on 15 mg every 6 hours as needed.  Will reduce to 7.5 mg every 6 hours as needed.  Continue Tylenol every 6 hours.  Also suggested tramadol however patient declines.      3. PAF (paroxysmal atrial fibrillation) (H)  I48.0  Pacemaker placement with recent atrial fibs seen.  She was seen by cardiology and placed on aspirin 81 mg twice daily.  No longer on anticoagulation given her falls.      4. Acute on chronic respiratory failure with hypoxemia (H)  J96.21  She has been weaned off her oxygen.      5. CHF (congestive heart failure), NYHA class I, acute, diastolic (H)  I50.31  Recently resumed her Lasix 10 mg daily.          This note has been dictated using voice recognition software. Any grammatical or context distortions are unintentional  and inherent to the software    Electronically signed by: Marni May, CNP

## 2022-10-28 LAB
ERYTHROCYTE [DISTWIDTH] IN BLOOD BY AUTOMATED COUNT: 14.2 % (ref 10–15)
HCT VFR BLD AUTO: 39.1 % (ref 35–47)
HGB BLD-MCNC: 12.2 G/DL (ref 11.7–15.7)
MCH RBC QN AUTO: 29.5 PG (ref 26.5–33)
MCHC RBC AUTO-ENTMCNC: 31.2 G/DL (ref 31.5–36.5)
MCV RBC AUTO: 94 FL (ref 78–100)
PLATELET # BLD AUTO: 261 10E3/UL (ref 150–450)
RBC # BLD AUTO: 4.14 10E6/UL (ref 3.8–5.2)
WBC # BLD AUTO: 4.8 10E3/UL (ref 4–11)

## 2022-10-28 PROCEDURE — P9604 ONE-WAY ALLOW PRORATED TRIP: HCPCS | Mod: ORL | Performed by: FAMILY MEDICINE

## 2022-10-28 PROCEDURE — 85027 COMPLETE CBC AUTOMATED: CPT | Mod: ORL | Performed by: FAMILY MEDICINE

## 2022-10-28 PROCEDURE — 36415 COLL VENOUS BLD VENIPUNCTURE: CPT | Mod: ORL | Performed by: FAMILY MEDICINE

## 2022-10-31 ENCOUNTER — TRANSITIONAL CARE UNIT VISIT (OUTPATIENT)
Dept: GERIATRICS | Facility: CLINIC | Age: 77
End: 2022-10-31
Payer: COMMERCIAL

## 2022-10-31 VITALS
HEART RATE: 80 BPM | OXYGEN SATURATION: 95 % | BODY MASS INDEX: 29.11 KG/M2 | SYSTOLIC BLOOD PRESSURE: 155 MMHG | WEIGHT: 169.6 LBS | RESPIRATION RATE: 18 BRPM | TEMPERATURE: 97.7 F | DIASTOLIC BLOOD PRESSURE: 82 MMHG

## 2022-10-31 DIAGNOSIS — S42.202D CLOSED FRACTURE OF PROXIMAL END OF LEFT HUMERUS WITH ROUTINE HEALING, UNSPECIFIED FRACTURE MORPHOLOGY, SUBSEQUENT ENCOUNTER: ICD-10-CM

## 2022-10-31 DIAGNOSIS — J10.1 INFLUENZA A: Primary | ICD-10-CM

## 2022-10-31 DIAGNOSIS — I50.31 CHF (CONGESTIVE HEART FAILURE), NYHA CLASS I, ACUTE, DIASTOLIC (H): ICD-10-CM

## 2022-10-31 DIAGNOSIS — R19.7 DIARRHEA, UNSPECIFIED TYPE: ICD-10-CM

## 2022-10-31 DIAGNOSIS — I48.0 PAF (PAROXYSMAL ATRIAL FIBRILLATION) (H): ICD-10-CM

## 2022-10-31 DIAGNOSIS — S12.490D COMPRESSION FRACTURE OF C5 VERTEBRA WITH ROUTINE HEALING, SUBSEQUENT ENCOUNTER: ICD-10-CM

## 2022-10-31 PROCEDURE — 99316 NF DSCHRG MGMT 30 MIN+: CPT | Performed by: NURSE PRACTITIONER

## 2022-10-31 RX ORDER — FUROSEMIDE 20 MG
20 TABLET ORAL DAILY
COMMUNITY

## 2022-10-31 NOTE — LETTER
10/31/2022        RE: Mackenzie Brown  [address]  [facility] [id number]        [facility]    Code Status:  FULL CODE   Visit Type:   Chief Complaint   Patient presents with     Nursing Home Acute     TCU Follow up     Facility:  [facility]) [[id number]]           Transitional Care Course: Mackenzie Brown is a 77 year old female who I am seeing today for discharge from the TCU.  Patient recently hospitalized on 10/6/2022 at [facility].  Patient had a fall sustaining a C5 vertebral compression fracture and a left humeral fracture.  Past medical history includes generalized anxiety disorder, hypertension, hypothyroidism, obstructive sleep apnea on CPAP, GERD, presence of Watchman left atrial appendage closure device, senile dementia and congestive heart failure.  She was seen by neurology and underwent an MRI of the cervical spine.  Suggestion were to wear c-collar at all times.  She also had post fall tendinitis and dizziness.  Neurology felt this may be possible postconcussive syndrome versus intrinsic ear issue.  Exam negative for palatal muscle myoclonus per the records.  Recommended follow-up with ENT if tinnitus persists.  She also had some associated nausea.  Left humeral fracture.  She was seen by orthopedics and felt it was nonoperative.  She was placed in a sling.  She continues on morphine and Tylenol for pain.  She did develop acute on chronic hypoxia.  She was placed on oxygen.  She continues on this at 2 L.  Chest x-ray showed atelectasis.    On today's visit patient is sitting up in bedside chair.  Patient recently tested positive for influenza A. Today is her last day of Tamiflu.  She denies any head congestion, runny nose, sore throat or respiratory-like symptoms.  She was feeling fatigued, nauseous and had diarrhea.  She continues to have 1-2 loose stools per day.  Her Lasix has been on hold while she was having loose stools.  She continues with  Imodium as needed.  She is also taking Zofran as needed.  She does have a history of chronic nausea.  She reports hearing loss.  She has experienced this since fall.  She was seen by audiology last week and recommended follow-up with ENT.  She has a follow-up on Friday.  I did attempt to look in her ear today however there was no otoscope onsite.  She did previously have tinnitus however this has resolved.  Dizziness resolved.  She reports muffled hearing.  Recent left humeral fracture.  She continues in a sling.  She is currently nonweightbearing.  She is a follow-up with Ortho in 2 weeks.  C5 fracture now healed.  She is able to be out of her collar.  She continues on tramadol for pain.  We did talk about attempting to wean off at home.  CHF.  Lasix to be resumed in the a.m. at 10 mg.  Patient admitted on oxygen however has been weaned off.  Blood pressures occasionally elevated.  She does have some underlying anxiety however this appears to have improved with coming off morphine.    Active Ambulatory Problems     Diagnosis Date Noted     Adrenal nodule (H) 11/08/2010     PAF (paroxysmal atrial fibrillation) (H) 09/22/2018     AK (actinic keratosis) 12/21/2011     Amnesia 12/14/2020     Blood typing encounter 03/20/2017     Bunion 01/25/2011     Cataracts, both eyes 10/19/2016     Chronic heart failure with preserved ejection fraction (H) 10/12/2019     Chronic back pain 10/29/2009     Chronic headache disorder 12/14/2020     Complications of medical care 10/29/2009     Depression with anxiety 12/14/2020     Diverticular disease of colon 02/16/2015     Diverticulitis 12/14/2020     Diverticulitis of colon 09/19/2010     Eczema 10/05/2010     Fibula fracture 04/27/2015     GERD (gastroesophageal reflux disease) 12/14/2020     History of colonic polyps 01/24/2011     Hypertension 07/28/2015     Insomnia 12/14/2020     Irritable bowel syndrome with diarrhea 10/14/2020     Knee pain, left 10/23/2015     Lactose  intolerance 10/14/2020     Major depressive disorder, recurrent episode, mild (H) 11/28/2011     Malignant neoplasm of skin 11/11/2009     Migraine 10/29/2009     Mild cognitive impairment 10/23/2014     Toxic multinodular goiter 02/27/2015     Obesity 12/14/2020     Osteoarthrosis 10/29/2009     Primary osteoarthritis involving multiple joints 02/22/2017     Senile osteoporosis 04/15/2012     Sensorineural hearing loss, asymmetrical 11/23/2011     Spondylosis of lumbar region without myelopathy or radiculopathy 03/07/2017     Tibial plateau fracture 04/27/2015     Wrist fracture, left 10/28/2011     Neck pain 10/29/2009     Hemorrhage of rectum and anus 07/31/2019     Postoperative hypothyroidism 02/24/2021     Tachy-braydon syndrome (H) 12/07/2021     EVERT on CPAP 02/24/2021     Irregular bowel habits 07/31/2019     Hypoxemia 02/24/2021     Hemorrhoids 01/30/2020     Concussion 01/13/2021     Chest pain 09/01/2021     Change in bowel habits 10/11/2022     B12 deficiency 04/11/2022     Senile dementia, uncomplicated (H) 10/06/2022     Presence of Watchman left atrial appendage closure device 10/06/2022     Closed nondisplaced fracture of fifth cervical vertebra (H) 10/06/2022     Closed fracture of proximal end of humerus 10/06/2022     Acute on chronic respiratory failure with hypoxemia (H) 10/06/2022     Resolved Ambulatory Problems     Diagnosis Date Noted     Pain in limb 05/02/2008     Other postprocedural status(V45.89) 05/02/2008     Ulcer of varicose vein of left leg (H) 01/30/2019     No Additional Past Medical History     Allergies   Allergen Reactions     Demerol [Meperidine] Unknown     Levofloxacin Unknown     Metronidazole Unknown       All Meds and Allergies reviewed in the record at the facility and is the most up-to-date.      Current Outpatient Medications   Medication Sig     furosemide (LASIX) 20 MG tablet Take 20 mg by mouth daily Take 0.5 tab every day.     acetaminophen (TYLENOL) 500 MG tablet  Take 1,000 mg by mouth 4 times daily     amLODIPine (NORVASC) 5 MG tablet Take 5 mg by mouth daily     aspirin (ASA) 325 MG EC tablet Take 1 tablet (325 mg) by mouth daily     aspirin-acetaminophen-caffeine (EXCEDRIN MIGRAINE) 250-250-65 MG tablet Take 1 tablet by mouth daily as needed for headaches     buPROPion (WELLBUTRIN SR) 150 MG 12 hr tablet Take 1 tablet (150 mg) by mouth daily     Calcium-Magnesium-Vitamin D (CITRACAL SLOW RELEASE) 600- MG-MG-UNIT TB24 Take 600 mg by mouth     cholecalciferol 50 MCG (2000 UT) CAPS Take 2,000 Units by mouth     Cyanocobalamin 1000 MCG CAPS Take 1,000 mcg by mouth daily     donepezil (ARICEPT) 10 MG tablet      irbesartan (AVAPRO) 150 MG tablet Take 1 tablet (150 mg) by mouth At Bedtime     levothyroxine (SYNTHROID/LEVOTHROID) 125 MCG tablet Take 1 tablet (125 mcg) by mouth daily     loperamide (IMODIUM) 2 MG capsule Take 2 mg by mouth 4 times daily as needed for diarrhea     nitroGLYcerin (NITROSTAT) 0.4 MG sublingual tablet Place 1 tablet (0.4 mg) under the tongue every 5 minutes as needed for chest pain For chest pain place 1 tablet under the tongue every 5 minutes for 3 doses. If symptoms persist 5 minutes after 1st dose call 911.     ondansetron (ZOFRAN) 4 MG tablet Take 1 tablet (4 mg) by mouth every 8 hours as needed for nausea     pantoprazole sodium (PROTONIX) 40 MG packet Take 1 packet (40 mg) by mouth daily     PARoxetine (PAXIL) 40 MG tablet Take 1 tablet (40 mg) by mouth every morning     sennosides (SENOKOT) 8.6 MG tablet Take 1 tablet by mouth 2 times daily     traMADol (ULTRAM) 50 MG tablet Take 50 mg by mouth every 6 hours as needed for severe pain     traZODone (DESYREL) 50 MG tablet Take 1 tablet (50 mg) by mouth At Bedtime     No current facility-administered medications for this visit.       REVIEW OF SYSTEMS:   Review of Systems  7 point review of systems reviewed.  Pertinent positives in HPI.    PHYSICAL EXAMINATION:  Physical Exam     Vital  signs: BP (!) 155/82   Pulse 80   Temp 97.7  F (36.5  C)   Resp 18   Wt 76.9 kg (169 lb 9.6 oz)   SpO2 95%   BMI 29.11 kg/m    General: Awake, sitting up in bedside chair, alert,  conversant  HEENT: Pink conjunctiva, hoarse voice, dry oral mucosa.  No rhinorrhea or nasal discharge.  NECK: Patient now out of her cervical collar.  CVS:  S1  S2, without murmur or gallop.   LUNG: Clear to auscultation, No wheezes, rales or rhonci.  No cough on exam.  EXTREMITIES: Moves both upper and lower extremities, with limitation to the left upper extremity.  Sling in place.  Positive CMS.   SKIN: Warm and dry  NEUROLOGIC: Intact, pulses palpable  PSYCHIATRIC: Pleasant.      Labs:  All labs reviewed in the nursing home record and Epic   @  Lab Results   Component Value Date    WBC 3.8 10/13/2019    WBC 6.1 02/04/2008     Lab Results   Component Value Date    RBC 4.44 10/13/2019    RBC 4.21 02/04/2008     Lab Results   Component Value Date    HGB 13.4 10/13/2019    HGB 12.5 02/04/2008     Lab Results   Component Value Date    HCT 40.7 10/13/2019    HCT 37.4 02/04/2008     Lab Results   Component Value Date    MCV 92 10/13/2019    MCV 89 02/04/2008     Lab Results   Component Value Date    MCH 30.2 10/13/2019    MCH 29.7 02/04/2008     Lab Results   Component Value Date    MCHC 32.9 10/13/2019    MCHC 33.4 02/04/2008     Lab Results   Component Value Date    RDW 13.9 10/13/2019    RDW 13.2 02/04/2008     Lab Results   Component Value Date     10/13/2019     02/04/2008        @Last Comprehensive Metabolic Panel:  Sodium   Date Value Ref Range Status   10/27/2022 142 136 - 145 mmol/L Final     Potassium   Date Value Ref Range Status   10/27/2022 4.2 3.4 - 5.3 mmol/L Final   10/15/2019 5.0 3.5 - 5.0 mmol/L Final     Chloride   Date Value Ref Range Status   10/27/2022 100 98 - 107 mmol/L Final   10/14/2019 103 98 - 107 mmol/L Final     Carbon Dioxide (CO2)   Date Value Ref Range Status   10/27/2022 31 (G) 22 - 70  mmol/L Final   10/14/2019 28 22 - 31 mmol/L Final     Anion Gap   Date Value Ref Range Status   10/27/2022 11 7 - 15 mmol/L Final   10/14/2019 8 5 - 18 mmol/L Final     Glucose   Date Value Ref Range Status   10/27/2022 86 70 - 99 mg/dL Final   10/14/2019 111 70 - 125 mg/dL Final     Urea Nitrogen   Date Value Ref Range Status   10/27/2022 6.8 (L) 8.0 - 23.0 mg/dL Final   10/14/2019 12 8 - 28 mg/dL Final     Creatinine   Date Value Ref Range Status   10/27/2022 0.59 0.51 - 0.95 mg/dL Final     GFR Estimate   Date Value Ref Range Status   10/27/2022 >90 >60 mL/min/1.73m2 Final     Comment:     Effective December 21, 2021 eGFRcr in adults is calculated using the 2021 CKD-EPI creatinine equation which includes age and gender (Kacy et al., [place], DOI: 10.1056/CBIPsm4179458)   10/14/2019 >60 >60 mL/min/1.73m2 Final     Calcium   Date Value Ref Range Status   10/27/2022 8.9 8.8 - 10.2 mg/dL Final     Assessment/plan:       ICD-10-CM    1. Influenza A  J10.1  Today's last day of Tamiflu.  She denies respiratory symptoms.  Nausea appears to be improving.  She continues on as needed Zofran.  She continues with occasional loose stool.      2. Closed fracture of proximal end of left humerus with routine healing, unspecified fracture morphology, subsequent encounter  S42.202D  Continues in sling.  Follow-up with Ortho in 2 weeks.  Continue tramadol for pain with attempt to wean off.  I will send her with 15 tabs.      3. Compression fracture of C5 vertebra with routine healing, subsequent encounter  S12.490D  Now healed.  She is out of her cervical collar.      4. Diarrhea, unspecified type  R19.7  Patient having 1-2 loose stools per day.  Continue as needed Imodium.  C. difficile was negative.      5. PAF (paroxysmal atrial fibrillation) (H)  I48.0  Regular rate and rhythm on exam.      6. CHF (congestive heart failure), NYHA class I, acute, diastolic (H)  I50.31  Resume Lasix tomorrow.  This was held during her influenza due  to poor oral intake, low BP and diarrhea.        Okay to DC home with current meds and treatments.  Home PT, OT, home health aide and RN for medication management.  Follow-up with primary care provider in 1 week.  Follow-up with Ortho in 2 weeks.  Follow-up with ENT on Friday.    DISCHARGE PLAN/FACE TO FACE:  I certify that this patient is under my care and that I, or a nurse practitioner or physician's assistant working with me, had a face-to-face encounter that meets the physician face-to-face encounter requirements with this patient.       I certify that, based on my findings, the following services are medically necessary home health services.    My clinical findings support the need for the above skilled services.    This patient is homebound because: Recent fall sustaining humeral fracture, C5 fracture and recent influenza A.    The patient is, or has been, under my care and I have initiated the establishment of the plan of care. This patient will be followed by a physician who will periodically review the plan of care.    Total time spent for this visit was 35 minutes were greater than 50% time spent face-to-face patient reviewing discharge medications, home care services and follow-ups.    This note has been dictated using voice recognition software. Any grammatical or context distortions are unintentional and inherent to the software    Electronically signed by: Marni May CNP           Sincerely,        Marni May NP

## 2022-11-01 NOTE — PROGRESS NOTES
[facility] GERIATRIC SERVICES    Code Status:  FULL CODE   Visit Type:   Chief Complaint   Patient presents with     Nursing Home Acute     TCU Follow up     Facility:  [facility] ([facility]) [[id number]]           Transitional Care Course: Mackenzie Brown is a 77 year old female who I am seeing today for discharge from the TCU.  Patient recently hospitalized on 10/6/2022 at [facility].  Patient had a fall sustaining a C5 vertebral compression fracture and a left humeral fracture.  Past medical history includes generalized anxiety disorder, hypertension, hypothyroidism, obstructive sleep apnea on CPAP, GERD, presence of Watchman left atrial appendage closure device, senile dementia and congestive heart failure.  She was seen by neurology and underwent an MRI of the cervical spine.  Suggestion were to wear c-collar at all times.  She also had post fall tendinitis and dizziness.  Neurology felt this may be possible postconcussive syndrome versus intrinsic ear issue.  Exam negative for palatal muscle myoclonus per the records.  Recommended follow-up with ENT if tinnitus persists.  She also had some associated nausea.  Left humeral fracture.  She was seen by orthopedics and felt it was nonoperative.  She was placed in a sling.  She continues on morphine and Tylenol for pain.  She did develop acute on chronic hypoxia.  She was placed on oxygen.  She continues on this at 2 L.  Chest x-ray showed atelectasis.    On today's visit patient is sitting up in bedside chair.  Patient recently tested positive for influenza A. Today is her last day of Tamiflu.  She denies any head congestion, runny nose, sore throat or respiratory-like symptoms.  She was feeling fatigued, nauseous and had diarrhea.  She continues to have 1-2 loose stools per day.  Her Lasix has been on hold while she was having loose stools.  She continues with Imodium as needed.  She is also taking Zofran as needed.  She does have a history of chronic nausea.   She reports hearing loss.  She has experienced this since fall.  She was seen by audiology last week and recommended follow-up with ENT.  She has a follow-up on Friday.  I did attempt to look in her ear today however there was no otoscope onsite.  She did previously have tinnitus however this has resolved.  Dizziness resolved.  She reports muffled hearing.  Recent left humeral fracture.  She continues in a sling.  She is currently nonweightbearing.  She is a follow-up with Ortho in 2 weeks.  C5 fracture now healed.  She is able to be out of her collar.  She continues on tramadol for pain.  We did talk about attempting to wean off at home.  CHF.  Lasix to be resumed in the a.m. at 10 mg.  Patient admitted on oxygen however has been weaned off.  Blood pressures occasionally elevated.  She does have some underlying anxiety however this appears to have improved with coming off morphine.    Active Ambulatory Problems     Diagnosis Date Noted     Adrenal nodule (H) 11/08/2010     PAF (paroxysmal atrial fibrillation) (H) 09/22/2018     AK (actinic keratosis) 12/21/2011     Amnesia 12/14/2020     Blood typing encounter 03/20/2017     Bunion 01/25/2011     Cataracts, both eyes 10/19/2016     Chronic heart failure with preserved ejection fraction (H) 10/12/2019     Chronic back pain 10/29/2009     Chronic headache disorder 12/14/2020     Complications of medical care 10/29/2009     Depression with anxiety 12/14/2020     Diverticular disease of colon 02/16/2015     Diverticulitis 12/14/2020     Diverticulitis of colon 09/19/2010     Eczema 10/05/2010     Fibula fracture 04/27/2015     GERD (gastroesophageal reflux disease) 12/14/2020     History of colonic polyps 01/24/2011     Hypertension 07/28/2015     Insomnia 12/14/2020     Irritable bowel syndrome with diarrhea 10/14/2020     Knee pain, left 10/23/2015     Lactose intolerance 10/14/2020     Major depressive disorder, recurrent episode, mild (H) 11/28/2011     Malignant  neoplasm of skin 11/11/2009     Migraine 10/29/2009     Mild cognitive impairment 10/23/2014     Toxic multinodular goiter 02/27/2015     Obesity 12/14/2020     Osteoarthrosis 10/29/2009     Primary osteoarthritis involving multiple joints 02/22/2017     Senile osteoporosis 04/15/2012     Sensorineural hearing loss, asymmetrical 11/23/2011     Spondylosis of lumbar region without myelopathy or radiculopathy 03/07/2017     Tibial plateau fracture 04/27/2015     Wrist fracture, left 10/28/2011     Neck pain 10/29/2009     Hemorrhage of rectum and anus 07/31/2019     Postoperative hypothyroidism 02/24/2021     Tachy-braydon syndrome (H) 12/07/2021     EVERT on CPAP 02/24/2021     Irregular bowel habits 07/31/2019     Hypoxemia 02/24/2021     Hemorrhoids 01/30/2020     Concussion 01/13/2021     Chest pain 09/01/2021     Change in bowel habits 10/11/2022     B12 deficiency 04/11/2022     Senile dementia, uncomplicated (H) 10/06/2022     Presence of Watchman left atrial appendage closure device 10/06/2022     Closed nondisplaced fracture of fifth cervical vertebra (H) 10/06/2022     Closed fracture of proximal end of humerus 10/06/2022     Acute on chronic respiratory failure with hypoxemia (H) 10/06/2022     Resolved Ambulatory Problems     Diagnosis Date Noted     Pain in limb 05/02/2008     Other postprocedural status(V45.89) 05/02/2008     Ulcer of varicose vein of left leg (H) 01/30/2019     No Additional Past Medical History     Allergies   Allergen Reactions     Demerol [Meperidine] Unknown     Levofloxacin Unknown     Metronidazole Unknown       All Meds and Allergies reviewed in the record at the facility and is the most up-to-date.      Current Outpatient Medications   Medication Sig     furosemide (LASIX) 20 MG tablet Take 20 mg by mouth daily Take 0.5 tab every day.     acetaminophen (TYLENOL) 500 MG tablet Take 1,000 mg by mouth 4 times daily     amLODIPine (NORVASC) 5 MG tablet Take 5 mg by mouth daily      aspirin (ASA) 325 MG EC tablet Take 1 tablet (325 mg) by mouth daily     aspirin-acetaminophen-caffeine (EXCEDRIN MIGRAINE) 250-250-65 MG tablet Take 1 tablet by mouth daily as needed for headaches     buPROPion (WELLBUTRIN SR) 150 MG 12 hr tablet Take 1 tablet (150 mg) by mouth daily     Calcium-Magnesium-Vitamin D (CITRACAL SLOW RELEASE) 600- MG-MG-UNIT TB24 Take 600 mg by mouth     cholecalciferol 50 MCG (2000 UT) CAPS Take 2,000 Units by mouth     Cyanocobalamin 1000 MCG CAPS Take 1,000 mcg by mouth daily     donepezil (ARICEPT) 10 MG tablet      irbesartan (AVAPRO) 150 MG tablet Take 1 tablet (150 mg) by mouth At Bedtime     levothyroxine (SYNTHROID/LEVOTHROID) 125 MCG tablet Take 1 tablet (125 mcg) by mouth daily     loperamide (IMODIUM) 2 MG capsule Take 2 mg by mouth 4 times daily as needed for diarrhea     nitroGLYcerin (NITROSTAT) 0.4 MG sublingual tablet Place 1 tablet (0.4 mg) under the tongue every 5 minutes as needed for chest pain For chest pain place 1 tablet under the tongue every 5 minutes for 3 doses. If symptoms persist 5 minutes after 1st dose call 911.     ondansetron (ZOFRAN) 4 MG tablet Take 1 tablet (4 mg) by mouth every 8 hours as needed for nausea     pantoprazole sodium (PROTONIX) 40 MG packet Take 1 packet (40 mg) by mouth daily     PARoxetine (PAXIL) 40 MG tablet Take 1 tablet (40 mg) by mouth every morning     sennosides (SENOKOT) 8.6 MG tablet Take 1 tablet by mouth 2 times daily     traMADol (ULTRAM) 50 MG tablet Take 50 mg by mouth every 6 hours as needed for severe pain     traZODone (DESYREL) 50 MG tablet Take 1 tablet (50 mg) by mouth At Bedtime     No current facility-administered medications for this visit.       REVIEW OF SYSTEMS:   Review of Systems  7 point review of systems reviewed.  Pertinent positives in HPI.    PHYSICAL EXAMINATION:  Physical Exam     Vital signs: BP (!) 155/82   Pulse 80   Temp 97.7  F (36.5  C)   Resp 18   Wt 76.9 kg (169 lb 9.6 oz)   SpO2  95%   BMI 29.11 kg/m    General: Awake, sitting up in bedside chair, alert,  conversant  HEENT: Pink conjunctiva, hoarse voice, dry oral mucosa.  No rhinorrhea or nasal discharge.  NECK: Patient now out of her cervical collar.  CVS:  S1  S2, without murmur or gallop.   LUNG: Clear to auscultation, No wheezes, rales or rhonci.  No cough on exam.  EXTREMITIES: Moves both upper and lower extremities, with limitation to the left upper extremity.  Sling in place.  Positive CMS.   SKIN: Warm and dry  NEUROLOGIC: Intact, pulses palpable  PSYCHIATRIC: Pleasant.      Labs:  All labs reviewed in the nursing home record and Epic   @  Lab Results   Component Value Date    WBC 3.8 10/13/2019    WBC 6.1 02/04/2008     Lab Results   Component Value Date    RBC 4.44 10/13/2019    RBC 4.21 02/04/2008     Lab Results   Component Value Date    HGB 13.4 10/13/2019    HGB 12.5 02/04/2008     Lab Results   Component Value Date    HCT 40.7 10/13/2019    HCT 37.4 02/04/2008     Lab Results   Component Value Date    MCV 92 10/13/2019    MCV 89 02/04/2008     Lab Results   Component Value Date    MCH 30.2 10/13/2019    MCH 29.7 02/04/2008     Lab Results   Component Value Date    MCHC 32.9 10/13/2019    MCHC 33.4 02/04/2008     Lab Results   Component Value Date    RDW 13.9 10/13/2019    RDW 13.2 02/04/2008     Lab Results   Component Value Date     10/13/2019     02/04/2008        @Last Comprehensive Metabolic Panel:  Sodium   Date Value Ref Range Status   10/27/2022 142 136 - 145 mmol/L Final     Potassium   Date Value Ref Range Status   10/27/2022 4.2 3.4 - 5.3 mmol/L Final   10/15/2019 5.0 3.5 - 5.0 mmol/L Final     Chloride   Date Value Ref Range Status   10/27/2022 100 98 - 107 mmol/L Final   10/14/2019 103 98 - 107 mmol/L Final     Carbon Dioxide (CO2)   Date Value Ref Range Status   10/27/2022 31 (H) 22 - 29 mmol/L Final   10/14/2019 28 22 - 31 mmol/L Final     Anion Gap   Date Value Ref Range Status   10/27/2022 11 7  - 15 mmol/L Final   10/14/2019 8 5 - 18 mmol/L Final     Glucose   Date Value Ref Range Status   10/27/2022 86 70 - 99 mg/dL Final   10/14/2019 111 70 - 125 mg/dL Final     Urea Nitrogen   Date Value Ref Range Status   10/27/2022 6.8 (L) 8.0 - 23.0 mg/dL Final   10/14/2019 12 8 - 28 mg/dL Final     Creatinine   Date Value Ref Range Status   10/27/2022 0.59 0.51 - 0.95 mg/dL Final     GFR Estimate   Date Value Ref Range Status   10/27/2022 >90 >60 mL/min/1.73m2 Final     Comment:     Effective December 21, 2021 eGFRcr in adults is calculated using the 2021 CKD-EPI creatinine equation which includes age and gender (Kacy et al., NEJ, DOI: 10.1056/CRMPka0534531)   10/14/2019 >60 >60 mL/min/1.73m2 Final     Calcium   Date Value Ref Range Status   10/27/2022 8.9 8.8 - 10.2 mg/dL Final     Assessment/plan:       ICD-10-CM    1. Influenza A  J10.1  Today's last day of Tamiflu.  She denies respiratory symptoms.  Nausea appears to be improving.  She continues on as needed Zofran.  She continues with occasional loose stool.      2. Closed fracture of proximal end of left humerus with routine healing, unspecified fracture morphology, subsequent encounter  S42.202D  Continues in sling.  Follow-up with Ortho in 2 weeks.  Continue tramadol for pain with attempt to wean off.  I will send her with 15 tabs.      3. Compression fracture of C5 vertebra with routine healing, subsequent encounter  S12.490D  Now healed.  She is out of her cervical collar.      4. Diarrhea, unspecified type  R19.7  Patient having 1-2 loose stools per day.  Continue as needed Imodium.  C. difficile was negative.      5. PAF (paroxysmal atrial fibrillation) (H)  I48.0  Regular rate and rhythm on exam.      6. CHF (congestive heart failure), NYHA class I, acute, diastolic (H)  I50.31  Resume Lasix tomorrow.  This was held during her influenza due to poor oral intake, low BP and diarrhea.        Okay to DC home with current meds and treatments.  Home PT, OT,  home health aide and RN for medication management.  Follow-up with primary care provider in 1 week.  Follow-up with Ortho in 2 weeks.  Follow-up with ENT on Friday.    DISCHARGE PLAN/FACE TO FACE:  I certify that this patient is under my care and that I, or a nurse practitioner or physician's assistant working with me, had a face-to-face encounter that meets the physician face-to-face encounter requirements with this patient.       I certify that, based on my findings, the following services are medically necessary home health services.    My clinical findings support the need for the above skilled services.    This patient is homebound because: Recent fall sustaining humeral fracture, C5 fracture and recent influenza A.    The patient is, or has been, under my care and I have initiated the establishment of the plan of care. This patient will be followed by a physician who will periodically review the plan of care.    Total time spent for this visit was 35 minutes were greater than 50% time spent face-to-face patient reviewing discharge medications, home care services and follow-ups.    This note has been dictated using voice recognition software. Any grammatical or context distortions are unintentional and inherent to the software    Electronically signed by: Marni May, CNP

## 2022-12-14 NOTE — TELEPHONE ENCOUNTER
2/20/2023-Spoke with [facility] to have images pushed ASAP-MR @ 1124am    2/24/2023-Images now in PACS-MR @ 452am    FUTURE VISIT INFORMATION      FUTURE VISIT INFORMATION:    Date: 3/3/2023    Time: 315pm    Location: [facility]  REFERRAL INFORMATION:    Referring provider:  Dr. Lainez    Referring providers clinic:  [facility]      Reason for visit/diagnosis  Headaches       RECORDS REQUESTED FROM:       Clinic name Comments Records Status Imaging Status   [facility]-11/7/2022    CT Head-10/4/2022, 3/12/2022 Care Everywhere Requested          Internal MR Brain-2/5/2016 Epic PACS

## 2023-03-03 ENCOUNTER — PRE VISIT (OUTPATIENT)
Dept: NEUROLOGY | Facility: CLINIC | Age: 78
End: 2023-03-03

## 2023-06-01 ENCOUNTER — HEALTH MAINTENANCE LETTER (OUTPATIENT)
Age: 78
End: 2023-06-01

## 2023-09-27 ENCOUNTER — TRANSCRIBE ORDERS (OUTPATIENT)
Dept: OTHER | Age: 78
End: 2023-09-27

## 2023-09-27 DIAGNOSIS — G89.29 CHRONIC NONINTRACTABLE HEADACHE, UNSPECIFIED HEADACHE TYPE: Primary | ICD-10-CM

## 2023-09-27 DIAGNOSIS — R51.9 CHRONIC NONINTRACTABLE HEADACHE, UNSPECIFIED HEADACHE TYPE: Primary | ICD-10-CM

## 2023-12-12 ENCOUNTER — TELEPHONE (OUTPATIENT)
Dept: NEUROLOGY | Facility: CLINIC | Age: 78
End: 2023-12-12
Payer: COMMERCIAL

## 2023-12-14 ENCOUNTER — TELEPHONE (OUTPATIENT)
Dept: NEUROLOGY | Facility: CLINIC | Age: 78
End: 2023-12-14
Payer: COMMERCIAL

## 2024-01-04 ENCOUNTER — MEDICAL CORRESPONDENCE (OUTPATIENT)
Dept: HEALTH INFORMATION MANAGEMENT | Facility: CLINIC | Age: 79
End: 2024-01-04
Payer: COMMERCIAL

## 2024-01-08 ENCOUNTER — TRANSCRIBE ORDERS (OUTPATIENT)
Dept: OTHER | Age: 79
End: 2024-01-08

## 2024-01-08 DIAGNOSIS — R53.83 FATIGUE, UNSPECIFIED TYPE: Primary | ICD-10-CM

## 2024-01-08 NOTE — TELEPHONE ENCOUNTER
Records Requested     January 8, 2024 3:43 PM   [id number]   Facility  [facility]   Outcome 3:45 pm Sent request for imaging to be pushed to PACS. -[facility] on 1/29/2024 at 9:43 AM Imaging resolved into PACS. -[facility]     RECORDS RECEIVED FROM: Care Everywhere   REASON FOR VISIT: Headaches   Date of Appt: 3/28/24 @ 4:30 pm    NOTES (FOR ALL VISITS) STATUS DETAILS   OFFICE NOTE from referring provider Care Everywhere 9/27/23 (Transcribed Orders), 9/25/23, 6/15/23, 5/16/23  Quinn Lainez MD @[facility]     MEDICATION LIST Internal    IMAGING  (FOR ALL VISITS)     MRI (HEAD, NECK, SPINE) Internal [facility]  2/14/23 MR Head Brain  10/17/22 MR Cervical Spine     CT (HEAD, NECK, SPINE) PACS [place]  10/4/22 CT Cervical Spine  10/4/22 CT Head    [facility]  3/12/22 CT Head Brain

## 2024-02-19 ENCOUNTER — TELEPHONE (OUTPATIENT)
Dept: NEUROLOGY | Facility: CLINIC | Age: 79
End: 2024-02-19
Payer: COMMERCIAL

## 2024-03-28 ENCOUNTER — PRE VISIT (OUTPATIENT)
Dept: NEUROLOGY | Facility: CLINIC | Age: 79
End: 2024-03-28

## 2024-03-28 ENCOUNTER — OFFICE VISIT (OUTPATIENT)
Dept: NEUROLOGY | Facility: CLINIC | Age: 79
End: 2024-03-28
Payer: COMMERCIAL

## 2024-03-28 VITALS
DIASTOLIC BLOOD PRESSURE: 77 MMHG | OXYGEN SATURATION: 92 % | SYSTOLIC BLOOD PRESSURE: 149 MMHG | RESPIRATION RATE: 20 BRPM | HEART RATE: 74 BPM

## 2024-03-28 DIAGNOSIS — G43.709 CHRONIC MIGRAINE WITHOUT AURA WITHOUT STATUS MIGRAINOSUS, NOT INTRACTABLE: ICD-10-CM

## 2024-03-28 DIAGNOSIS — G89.29 CHRONIC NONINTRACTABLE HEADACHE, UNSPECIFIED HEADACHE TYPE: ICD-10-CM

## 2024-03-28 DIAGNOSIS — M54.2 CERVICALGIA: Primary | ICD-10-CM

## 2024-03-28 DIAGNOSIS — R51.9 CHRONIC NONINTRACTABLE HEADACHE, UNSPECIFIED HEADACHE TYPE: ICD-10-CM

## 2024-03-28 PROCEDURE — 99417 PROLNG OP E/M EACH 15 MIN: CPT | Performed by: NURSE PRACTITIONER

## 2024-03-28 PROCEDURE — 99215 OFFICE O/P EST HI 40 MIN: CPT | Performed by: NURSE PRACTITIONER

## 2024-03-28 RX ORDER — BUPROPION HYDROCHLORIDE 150 MG/1
150 TABLET ORAL EVERY MORNING
COMMUNITY
Start: 2024-02-10

## 2024-03-28 ASSESSMENT — PAIN SCALES - GENERAL: PAINLEVEL: EXTREME PAIN (8)

## 2024-03-28 NOTE — PATIENT INSTRUCTIONS
Plan:  Botox trial for headaches prevention  Neck specialist non surgical with history of C5 fracture 2 years ago and persistent neck pain and headaches  Rescue treatment -a trial of Nurtec for migraines. Alternate with excedrin and tylenol.   Stay hydrated   Follow up 3-6 months or sooner if needed

## 2024-03-28 NOTE — LETTER
3/28/2024       RE: Mackenzie Brown  [address]  [facility] [address]     Dear Colleague,    Thank you for referring your patient, Mackenzie Brown, to the [facility] [place] at [facility]. Please see a copy of my visit note below.    [facility]   Headache Neurology Consult  March 28, 2024     Mackenzie Brown MRN# [id number]   YOB: 1945 Age: 78 year old            Assessment and Recommendations:     Mackenzie Brown is a 78 year old female   TA intact bilaterally and no jaw pain -GCA low yield. Will wait with additional work up   Exam non focal today  Trigger points tenderness neck, traps   Head CT  in 2022 -no acute structural abnormalities   Neck CT fracture of the anterior/inferior corner of the C5 vertebral body.  Headache all of her life but got worse over 2 years. Headaches symptoms appear to be consistent with chronic migraines. Possible post traumatic and acute analgesic overuse -otc Excedrin and tylenol daily.  Daughter suffers of migraines.   Big Fall 2 years ago and tripped over a . Broke neck, shoulder and hit her head.   Discussed headache treatment options. Reviewed Botox cons and pros. Pros-Reduces risk of polypharmacy with adding a new oral preventative medications and cons requires driving to the Clinic. Daughter states that she gets Botox with Dr Menard and hopes their schedule will line up eventually to help her mother with transportation. Will start Botox and see if same schedule can be achieved with time.     Plan:  Botox trial for headaches prevention and side effects reviewed. Patient is in agreement and ready to proceed once Botox approved.   Neck specialist non surgical with history of C5 fracture 2 years ago and persistent neck pain and headaches  Rescue treatment -a trial of Nurtec for migraines. Alternate with excedrin and tylenol. Limit use of OTC  medications for pain relieve to no more than 14 days per month.   Stay hydrated   Follow up 3-6 months after starting Botox or sooner if needed     I discussed all my recommendations with Mackenzie Brown who verbalizes understanding and comfortable with the plan.      67 minutes spent on the date of the encounter doing face to face access, chart  review, exam, results review,  meds review, treatment plan, documentation and further activities as noted above    AZEB Clancy, CNP [facility]  Headache certified  [facility]              Chief Complaint:       History is obtained from the patient and medical record.      Mackenzie Brown is a 78 year old female   Accompanying by her daughter Loreta  Remote history of migraines starting at age 12. No migraines during all 5 pregnancies. Migraines returned after the delivery. Headache all of her life but got worse over 2 years.   Big Fall 2 years ago and tripped over a . Broke neck, shoulder and hit her head. Prior to it smaller falls. Fall off in 2014-left knee.   Headaches every single day all the time for 2 years  Neck pain fracture fell 2 years -2 years ago. No PT ever  Location -frontal and a lot nausea. Nausea all the time.   No light or noise sensitivity. No vision changes  Not positional.   No jaw pain.   Sleeps at night but takes trazadone  Staying hydrated   Likes food     Eye exam -advanced prizm     Headache Tx  Exedrine migraine helps   Imitrex -never touched headache  Rizatriptan -never worked  Topiramate-did not help  Tylenol alternate with Excedrin migraine almost every day   On bupropion   On paroxetine  On trazadone   Tramadol was prescribed by [facility] for back pain and arthritis.     Daughter had brain aneurysm and migraines started 6 months before aneurysm and another daughter gets migraines     [facility]   [address]   [address]   [place] [address] [phone]  Jim  MD Diana                Past Medical History:    Past Medical History:   . Date    A-fib (HC)    Adrenal mass (HC) 10/19/2012    Adrenal nodule (HC) 11/08/2010    AK (actinic keratosis)    Anxiety state, unspecified 10/29/2009    Atrial fibrillation (HC) 01/01/1991    Bunion 04/12/2010    Cataracts, both eyes    CHF (congestive heart failure), NYHA class I, acute, diastolic (HC)    Chronic back pain 10/29/2009    Concussion    Depression, recurrent (HC)    Diverticular disease of large intestine    Diverticulitis of colon without hemorrhage    Eczema 10/05/2010    ROMARIO (generalized anxiety disorder)    Hammertoe 11/14/2012    Hemorrhoids    HTN (hypertension)    Irritable bowel syndrome (IBS)    Irritable bowel syndrome with diarrhea    Knee pain, left    Lactose intolerance    Migraine, unspecified, without mention of intractable migraine without mention of status migrainosus 10/29/2009    Mild cognitive impairment    MRSA (methicillin resistant staph aureus) culture positive 10/30/2011    Multiple thyroid nodules 02/27/2015    Neck pain 10/29/2009    EVERT on CPAP    Osteoarthritis 10/29/2009    Osteoporosis    PAF (paroxysmal atrial fibrillation) (HC)    Personal history of colonic polyps    Primary osteoarthritis involving multiple joints    Senile osteoporosis    Skin cancer 11/11/2009    Sleep apnea    SOBOE (shortness of breath on exertion) 02/18/2021    Spondylosis of lumbar region without myelopathy or radiculopathy    Ulcer of varicose vein of left leg (HC)        Past Surgical History:      Surgical History:  Ablation   Appendectomy   Breasts Implants 1999  Colectomy 2010  Hysterectomy   ORIF Tibial Shaft Fracture W/ Plates And Screws Left 4/2015  Pacemaker Generator   Patella Fracture Surgery Left 4/2015  Right Foot Surgery 2/21/13  Tonsillectomy   Total Knee Arthroplasty Left 10/27/2015  Total Thyroidectomy 02/23/2021  Wrist Fracture Surgery Left ~2009  [facility] Atrial Appendage Insert 03/30/2022      Social History     Socioeconomic History    Marital status:      Spouse name: Not on file    Number of children: Not on file    Years of education: Not on file    Highest education level: Not on file   Occupational History    Not on file   Tobacco Use    Smoking status: Never    Smokeless tobacco: Never   Substance and Sexual Activity    Alcohol use: Yes    Drug use: Never    Sexual activity: Not Currently   Other Topics Concern    Not on file   Social History Narrative    Not on file     Social Determinants of Health     Financial Resource Strain: Not on file   Food Insecurity: Not on file   Transportation Needs: Not on file   Physical Activity: Not on file   Stress: Not on file   Social Connections: Not on file   Interpersonal Safety: Not on file   Housing Stability: Not on file             Family History:   No family history on file.          Allergies:      Allergies   Allergen Reactions    Demerol [Meperidine] Unknown    Levofloxacin Unknown    Metronidazole Unknown             Medications:     Current Outpatient Medications:     acetaminophen (TYLENOL) 500 MG tablet, Take 1,000 mg by mouth 4 times daily, Disp: , Rfl:     amLODIPine (NORVASC) 5 MG tablet, Take 5 mg by mouth daily, Disp: , Rfl:     aspirin (ASA) 325 MG EC tablet, Take 1 tablet (325 mg) by mouth daily, Disp: , Rfl:     aspirin-acetaminophen-caffeine (EXCEDRIN MIGRAINE) 250-250-65 MG tablet, Take 1 tablet by mouth daily as needed for headaches, Disp: , Rfl:     buPROPion (WELLBUTRIN SR) 150 MG 12 hr tablet, Take 1 tablet (150 mg) by mouth daily, Disp: , Rfl:     Calcium-Magnesium-Vitamin D (CITRACAL SLOW RELEASE) 600- MG-MG-UNIT TB24, Take 600 mg by mouth, Disp: , Rfl:     cholecalciferol 50 MCG (2000 UT) CAPS, Take 2,000 Units by mouth, Disp: , Rfl:     Cyanocobalamin 1000 MCG CAPS, Take 1,000 mcg by mouth daily, Disp: , Rfl:     donepezil (ARICEPT) 10 MG tablet, , Disp: , Rfl:     furosemide (LASIX) 20 MG tablet, Take 20 mg by  mouth daily Take 0.5 tab every day., Disp: , Rfl:     irbesartan (AVAPRO) 150 MG tablet, Take 1 tablet (150 mg) by mouth At Bedtime, Disp: , Rfl:     levothyroxine (SYNTHROID/LEVOTHROID) 125 MCG tablet, Take 1 tablet (125 mcg) by mouth daily, Disp: , Rfl:     loperamide (IMODIUM) 2 MG capsule, Take 2 mg by mouth 4 times daily as needed for diarrhea, Disp: , Rfl:     nitroGLYcerin (NITROSTAT) 0.4 MG sublingual tablet, Place 1 tablet (0.4 mg) under the tongue every 5 minutes as needed for chest pain For chest pain place 1 tablet under the tongue every 5 minutes for 3 doses. If symptoms persist 5 minutes after 1st dose call 911., Disp: , Rfl:     ondansetron (ZOFRAN) 4 MG tablet, Take 1 tablet (4 mg) by mouth every 8 hours as needed for nausea, Disp: , Rfl:     pantoprazole sodium (PROTONIX) 40 MG packet, Take 1 packet (40 mg) by mouth daily, Disp: , Rfl:     PARoxetine (PAXIL) 40 MG tablet, Take 1 tablet (40 mg) by mouth every morning, Disp: , Rfl:     sennosides (SENOKOT) 8.6 MG tablet, Take 1 tablet by mouth 2 times daily, Disp: , Rfl:     traMADol (ULTRAM) 50 MG tablet, Take 50 mg by mouth every 6 hours as needed for severe pain, Disp: , Rfl:     traZODone (DESYREL) 50 MG tablet, Take 1 tablet (50 mg) by mouth At Bedtime, Disp: , Rfl:           Physical Exam:   There were no vitals taken for this visit.   Physical Exam:   General: NAD  Neurologic:   Mental Status Exam: Alert, awake and oriented to situation. No dysarthria. Speech of normal fluency.   Cranial Nerves: Fundoscopic exam with clear disc margins bilaterally. PERRLA, EOMs intact, no nystagmus, facial movements symmetric, facial sensation intact to light touch, hearing intact to conversation, trapezius and SCMs 5/5 bilaterally, tongue midline and fully mobile.  Motor: Normal tone in all four extremities,5/5 strength bilaterally  Sensory: Sensation intact to light touch on arms and legs bilaterally.    Coordination: Finger-nose-finger intact bilaterally.    Reflexes: symmetric in triceps, biceps, brachioradialis, patellar,  and plantars downgoing bilaterally.   Gait: Normal casual gait.  Head: Normocephalic, atraumatic. No radiating pain with palpation over the supraorbital notches, occipital nerves.  Temporal pulses intact.   Neck: Normal range of motion with lateral head movements and neck flexion.  Eyes: No conjunctival injection, no scleral icterus.           Data:     MRI brain   HEAD MRI WITHOUT IV CONTRAST  2/4/2016 3:26 PM     INDICATION:  Headaches, memory loss.  TECHNIQUE: Head MRI without intravenous contrast.  COMPARISON: 12/27/2011     FINDINGS:  Mild generalized atrophy, age-appropriate. Mild degree of age-related small vessel ischemic change, with periventricular and deep white matter T2 hyperintensities on axial FLAIR imaging. No acute ischemia apparent on the diffusion-weighted   imaging. No structural abnormality or mass lesion is evident. The hippocampal volumes are appropriate for the generalized cerebral atrophy. No gray matter heterotopia or cortical malformation.     Signal voids in the [place] and venous sinuses are within normal limits. Craniocervical junction and calvaria are unremarkable. Paranasal sinuses and mastoid air cells are clear.     IMPRESSION:  CONCLUSION:  1.   Mild age-related changes.  2.  No significant superimposed intracranial finding.    HEAD CT:  1.  No acute intracranial abnormality.  2.  Mild soft tissue contusion throughout the left lateral forehead and frontal scalp without underlying acute calvarial fracture.  3.  Stable mild chronic age-related changes.    CERVICAL SPINE CT:  1.  Acute, minimally displaced fracture of the anterior/inferior corner of the C5 vertebral body.  2.  No additional acute fracture or traumatic subluxation.  3.  Prevertebral soft tissue edema extending from the C4-C7 levels.  4.  No high-grade spinal canal or neuroforaminal stenosis.      Results discussed with Alyssa Pop on  10/4/2022 11:38 AM.  Narrative    For Patients: As a result of the 21st Century Cures Act, medical imaging exams and procedure reports are released immediately into your electronic medical record. You may view this report before your referring provider. If you have questions, please contact your health care provider.    EXAM: CT HEAD BRAIN WO, CT SPINE CERVICAL WO  LOCATION: [facility] MEDICAL IMAGING  DATE/TIME: 10/4/2022 10:57 AM    INDICATION: Traumatic head and neck injury. Fall.  COMPARISON: 03/12/2022  TECHNIQUE:  1. Routine CT head without IV contrast. Multiplanar reformats. Dose reduction techniques were used.  2. Routine CT cervical spine without IV contrast. Multiplanar reformats. Dose reduction techniques were used.    FINDINGS:  HEAD CT:  INTRACRANIAL CONTENTS: No intracranial hemorrhage, extraaxial collection, or mass effect.  No CT evidence of acute infarct. Mild presumed chronic small vessel ischemic changes. Mild generalized volume loss. No hydrocephalus.    VISUALIZED ORBITS/SINUSES/MASTOIDS: No intraorbital abnormality. No paranasal sinus mucosal disease. No middle ear or mastoid effusion.    BONES/SOFT TISSUES: Mild soft tissue swelling throughout the left lateral forehead and frontal scalp. No underlying acute calvarial fracture.      CERVICAL SPINE CT:  VERTEBRAE: Diffuse bone demineralization. There is an acute, minimally displaced fracture of the anterior/inferior corner of the C5 vertebral body (sagittal series 6, image #39). No associated vertebral body height loss. Otherwise normal vertebral body heights. Slight retrolisthesis of C3 on C4 and slight anterolisthesis of C7 on T1. No additional acute fracture or traumatic subluxation identified by CT. Ankylosis of the right C2-C3 facet joint.    CANAL/FORAMINA: No high-grade spinal canal or neuroforaminal stenosis. Mild degenerative spinal canal stenoses at C4-C5 through C6-C7, unchanged. Moderate degenerative neuroforaminal stenosis on the right at  C4-C5 and C5-C6.    PARASPINAL: Prevertebral soft tissue edema extending from the C4-C7 levels. Partially visualized left chest wall cardiac pacemaker and leads. Mild atherosclerotic calcifications of the right carotid bifurcation. Mild fibrotic change in the bilateral lung apices.      Again, thank you for allowing me to participate in the care of your patient.      Sincerely,    AZEB Hare CNP

## 2024-03-28 NOTE — PROGRESS NOTES
[facility]   Headache Neurology Consult  March 28, 2024     Mackenzie Brown MRN# [id number]   YOB: 1945 Age: 78 year old            Assessment and Recommendations:     Mackenzie Brown is a 78 year old female   TA intact bilaterally and no jaw pain -GCA low yield. Will wait with additional work up   Exam non focal today  Trigger points tenderness neck, traps   Head CT  in 2022 -no acute structural abnormalities   Neck CT fracture of the anterior/inferior corner of the C5 vertebral body.  Headache all of her life but got worse over 2 years. Headaches symptoms appear to be consistent with chronic migraines. Possible post traumatic and acute analgesic overuse -otc Excedrin and tylenol daily.  Daughter suffers of migraines.   Big Fall 2 years ago and tripped over a . Broke neck, shoulder and hit her head.   Discussed headache treatment options. Reviewed Botox cons and pros. Pros-Reduces risk of polypharmacy with adding a new oral preventative medications and cons requires driving to the Clinic. Daughter states that she gets Botox with Dr Menard and hopes their schedule will line up eventually to help her mother with transportation. Will start Botox and see if same schedule can be achieved with time.     Plan:  Botox trial for headaches prevention and side effects reviewed. Patient is in agreement and ready to proceed once Botox approved.   Neck specialist non surgical with history of C5 fracture 2 years ago and persistent neck pain and headaches  Rescue treatment -a trial of Nurtec for migraines. Alternate with excedrin and tylenol. Limit use of OTC medications for pain relieve to no more than 14 days per month.   Stay hydrated   Follow up 3-6 months after starting Botox or sooner if needed     I discussed all my recommendations with Mackenzie Brown who verbalizes understanding and comfortable with the plan.      67 minutes spent on the date of the encounter doing face to  face access, chart  review, exam, results review,  meds review, treatment plan, documentation and further activities as noted above    AZEB Clacny, CNP [facility]  Headache certified  [facility]              Chief Complaint:       History is obtained from the patient and medical record.      Mackenzie Brown is a 78 year old female   Accompanying by her daughter oLreta  Remote history of migraines starting at age 12. No migraines during all 5 pregnancies. Migraines returned after the delivery. Headache all of her life but got worse over 2 years.   Big Fall 2 years ago and tripped over a . Broke neck, shoulder and hit her head. Prior to it smaller falls. Fall off in 2014-left knee.   Headaches every single day all the time for 2 years  Neck pain fracture fell 2 years -2 years ago. No PT ever  Location -frontal and a lot nausea. Nausea all the time.   No light or noise sensitivity. No vision changes  Not positional.   No jaw pain.   Sleeps at night but takes trazadone  Staying hydrated   Likes food     Eye exam -advanced prizm     Headache Tx  Exedrine migraine helps   Imitrex -never touched headache  Rizatriptan -never worked  Topiramate-did not help  Tylenol alternate with Excedrin migraine almost every day   On bupropion   On paroxetine  On trazadone   Tramadol was prescribed by [facility] for back pain and arthritis.     Daughter had brain aneurysm and migraines started 6 months before aneurysm and another daughter gets migraines     [address]   [place] [address]   [phone]  Diana Fernandez MD                Past Medical History:    Past Medical History:   . Date    A-fib (HC)    Adrenal mass (HC) 10/19/2012    Adrenal nodule (HC) 11/08/2010    AK (actinic keratosis)    Anxiety state, unspecified 10/29/2009    Atrial fibrillation (HC) 01/01/1991    Bunion 04/12/2010    Cataracts, both eyes    CHF (congestive  heart failure), NYHA class I, acute, diastolic (HC)    Chronic back pain 10/29/2009    Concussion    Depression, recurrent (HC)    Diverticular disease of large intestine    Diverticulitis of colon without hemorrhage    Eczema 10/05/2010    ROMARIO (generalized anxiety disorder)    Hammertoe 11/14/2012    Hemorrhoids    HTN (hypertension)    Irritable bowel syndrome (IBS)    Irritable bowel syndrome with diarrhea    Knee pain, left    Lactose intolerance    Migraine, unspecified, without mention of intractable migraine without mention of status migrainosus 10/29/2009    Mild cognitive impairment    MRSA (methicillin resistant staph aureus) culture positive 10/30/2011    Multiple thyroid nodules 02/27/2015    Neck pain 10/29/2009    EVERT on CPAP    Osteoarthritis 10/29/2009    Osteoporosis    PAF (paroxysmal atrial fibrillation) (HC)    Personal history of colonic polyps    Primary osteoarthritis involving multiple joints    Senile osteoporosis    Skin cancer 11/11/2009    Sleep apnea    SOBOE (shortness of breath on exertion) 02/18/2021    Spondylosis of lumbar region without myelopathy or radiculopathy    Ulcer of varicose vein of left leg (HC)        Past Surgical History:      Surgical History:  Ablation   Appendectomy   Breasts Implants 1999  Colectomy 2010  Hysterectomy   ORIF Tibial Shaft Fracture W/ Plates And Screws Left 4/2015  Pacemaker Generator   Patella Fracture Surgery Left 4/2015  Right Foot Surgery 2/21/13  Tonsillectomy   Total Knee Arthroplasty Left 10/27/2015  Total Thyroidectomy 02/23/2021  Wrist Fracture Surgery Left ~2009  Hc Watchman Lt Atrial Appendage Insert 03/30/2022     Social History     Socioeconomic History    Marital status:      Spouse name: Not on file    Number of children: Not on file    Years of education: Not on file    Highest education level: Not on file   Occupational History    Not on file   Tobacco Use    Smoking status: Never    Smokeless tobacco: Never   Substance and  Sexual Activity    Alcohol use: Yes    Drug use: Never    Sexual activity: Not Currently   Other Topics Concern    Not on file   Social History Narrative    Not on file     Social Determinants of Health     Financial Resource Strain: Not on file   Food Insecurity: Not on file   Transportation Needs: Not on file   Physical Activity: Not on file   Stress: Not on file   Social Connections: Not on file   Interpersonal Safety: Not on file   Housing Stability: Not on file             Family History:   No family history on file.          Allergies:      Allergies   Allergen Reactions    Demerol [Meperidine] Unknown    Levofloxacin Unknown    Metronidazole Unknown             Medications:     Current Outpatient Medications:     acetaminophen (TYLENOL) 500 MG tablet, Take 1,000 mg by mouth 4 times daily, Disp: , Rfl:     amLODIPine (NORVASC) 5 MG tablet, Take 5 mg by mouth daily, Disp: , Rfl:     aspirin (ASA) 325 MG EC tablet, Take 1 tablet (325 mg) by mouth daily, Disp: , Rfl:     aspirin-acetaminophen-caffeine (EXCEDRIN MIGRAINE) 250-250-65 MG tablet, Take 1 tablet by mouth daily as needed for headaches, Disp: , Rfl:     buPROPion (WELLBUTRIN SR) 150 MG 12 hr tablet, Take 1 tablet (150 mg) by mouth daily, Disp: , Rfl:     Calcium-Magnesium-Vitamin D (CITRACAL SLOW RELEASE) 600- MG-MG-UNIT TB24, Take 600 mg by mouth, Disp: , Rfl:     cholecalciferol 50 MCG (2000 UT) CAPS, Take 2,000 Units by mouth, Disp: , Rfl:     Cyanocobalamin 1000 MCG CAPS, Take 1,000 mcg by mouth daily, Disp: , Rfl:     donepezil (ARICEPT) 10 MG tablet, , Disp: , Rfl:     furosemide (LASIX) 20 MG tablet, Take 20 mg by mouth daily Take 0.5 tab every day., Disp: , Rfl:     irbesartan (AVAPRO) 150 MG tablet, Take 1 tablet (150 mg) by mouth At Bedtime, Disp: , Rfl:     levothyroxine (SYNTHROID/LEVOTHROID) 125 MCG tablet, Take 1 tablet (125 mcg) by mouth daily, Disp: , Rfl:     loperamide (IMODIUM) 2 MG capsule, Take 2 mg by mouth 4 times daily as  needed for diarrhea, Disp: , Rfl:     nitroGLYcerin (NITROSTAT) 0.4 MG sublingual tablet, Place 1 tablet (0.4 mg) under the tongue every 5 minutes as needed for chest pain For chest pain place 1 tablet under the tongue every 5 minutes for 3 doses. If symptoms persist 5 minutes after 1st dose call 911., Disp: , Rfl:     ondansetron (ZOFRAN) 4 MG tablet, Take 1 tablet (4 mg) by mouth every 8 hours as needed for nausea, Disp: , Rfl:     pantoprazole sodium (PROTONIX) 40 MG packet, Take 1 packet (40 mg) by mouth daily, Disp: , Rfl:     PARoxetine (PAXIL) 40 MG tablet, Take 1 tablet (40 mg) by mouth every morning, Disp: , Rfl:     sennosides (SENOKOT) 8.6 MG tablet, Take 1 tablet by mouth 2 times daily, Disp: , Rfl:     traMADol (ULTRAM) 50 MG tablet, Take 50 mg by mouth every 6 hours as needed for severe pain, Disp: , Rfl:     traZODone (DESYREL) 50 MG tablet, Take 1 tablet (50 mg) by mouth At Bedtime, Disp: , Rfl:           Physical Exam:   There were no vitals taken for this visit.   Physical Exam:   General: NAD  Neurologic:   Mental Status Exam: Alert, awake and oriented to situation. No dysarthria. Speech of normal fluency.   Cranial Nerves: Fundoscopic exam with clear disc margins bilaterally. PERRLA, EOMs intact, no nystagmus, facial movements symmetric, facial sensation intact to light touch, hearing intact to conversation, trapezius and SCMs 5/5 bilaterally, tongue midline and fully mobile.  Motor: Normal tone in all four extremities,5/5 strength bilaterally  Sensory: Sensation intact to light touch on arms and legs bilaterally.    Coordination: Finger-nose-finger intact bilaterally.   Reflexes: symmetric in triceps, biceps, brachioradialis, patellar,  and plantars downgoing bilaterally.   Gait: Normal casual gait.  Head: Normocephalic, atraumatic. No radiating pain with palpation over the supraorbital notches, occipital nerves.  Temporal pulses intact.   Neck: Normal range of motion with lateral head movements  and neck flexion.  Eyes: No conjunctival injection, no scleral icterus.           Data:     MRI brain   HEAD MRI WITHOUT IV CONTRAST  2/4/2016 3:26 PM     INDICATION:  Headaches, memory loss.  TECHNIQUE: Head MRI without intravenous contrast.  COMPARISON: 12/27/2011     FINDINGS:  Mild generalized atrophy, age-appropriate. Mild degree of age-related small vessel ischemic change, with periventricular and deep white matter T2 hyperintensities on axial FLAIR imaging. No acute ischemia apparent on the diffusion-weighted   imaging. No structural abnormality or mass lesion is evident. The hippocampal volumes are appropriate for the generalized cerebral atrophy. No gray matter heterotopia or cortical malformation.     Signal voids in the [place] and venous sinuses are within normal limits. Craniocervical junction and calvaria are unremarkable. Paranasal sinuses and mastoid air cells are clear.     IMPRESSION:  CONCLUSION:  1.   Mild age-related changes.  2.  No significant superimposed intracranial finding.    HEAD CT:  1.  No acute intracranial abnormality.  2.  Mild soft tissue contusion throughout the left lateral forehead and frontal scalp without underlying acute calvarial fracture.  3.  Stable mild chronic age-related changes.    CERVICAL SPINE CT:  1.  Acute, minimally displaced fracture of the anterior/inferior corner of the C5 vertebral body.  2.  No additional acute fracture or traumatic subluxation.  3.  Prevertebral soft tissue edema extending from the C4-C7 levels.  4.  No high-grade spinal canal or neuroforaminal stenosis.      Results discussed with Alyssa Pop on 10/4/2022 11:38 AM.  Narrative    For Patients: As a result of the 21st Century Cures Act, medical imaging exams and procedure reports are released immediately into your electronic medical record. You may view this report before your referring provider. If you have questions, please contact your health care provider.    EXAM: CT  HEAD BRAIN WO, CT SPINE CERVICAL WO  LOCATION: [facility] MEDICAL [facility]AG[facility]  DATE/TIME: 10/4/2022 10:57 AM    INDICATION: Traumatic head and neck injury. Fall.  COMPARISON: 03/12/2022  TECHNIQUE:  1. Routine CT head without IV contrast. Multiplanar reformats. Dose reduction techniques were used.  2. Routine CT cervical spine without IV contrast. Multiplanar reformats. Dose reduction techniques were used.    FINDINGS:  HEAD CT:  INTRACRANIAL CONTENTS: No intracranial hemorrhage, extraaxial collection, or mass effect.  No CT evidence of acute infarct. Mild presumed chronic small vessel ischemic changes. Mild generalized volume loss. No hydrocephalus.    VISUALIZED ORBITS/SINUSES/MASTOIDS: No intraorbital abnormality. No paranasal sinus mucosal disease. No middle ear or mastoid effusion.    BONES/SOFT TISSUES: Mild soft tissue swelling throughout the left lateral forehead and frontal scalp. No underlying acute calvarial fracture.      CERVICAL SPINE CT:  VERTEBRAE: Diffuse bone demineralization. There is an acute, minimally displaced fracture of the anterior/inferior corner of the C5 vertebral body (sagittal series 6, image #39). No associated vertebral body height loss. Otherwise normal vertebral body heights. Slight retrolisthesis of C3 on C4 and slight anterolisthesis of C7 on T1. No additional acute fracture or traumatic subluxation identified by CT. Ankylosis of the right C2-C3 facet joint.    CANAL/FORAMINA: No high-grade spinal canal or neuroforaminal stenosis. Mild degenerative spinal canal stenoses at C4-C5 through C6-C7, unchanged. Moderate degenerative neuroforaminal stenosis on the right at C4-C5 and C5-C6.    PARASPINAL: Prevertebral soft tissue edema extending from the C4-C7 levels. Partially visualized left chest wall cardiac pacemaker and leads. Mild atherosclerotic calcifications of the right carotid bifurcation. Mild fibrotic change in the bilateral lung apices.

## 2024-03-28 NOTE — NURSING NOTE
Chief Complaint   Patient presents with    New Patient     BP (!) 149/77 (BP Location: Left arm, Patient Position: Sitting, Cuff Size: Adult Regular)   Pulse 74   Resp 20   SpO2 92%     EDER TINY

## 2024-03-29 ENCOUNTER — TELEPHONE (OUTPATIENT)
Dept: NEUROLOGY | Facility: CLINIC | Age: 79
End: 2024-03-29
Payer: COMMERCIAL

## 2024-03-29 NOTE — TELEPHONE ENCOUNTER
Prior Authorization Retail Medication Request    Medication/Dose: rimegepant (NURTEC) 75 MG ODT tablet  Diagnosis and ICD code (if different than what is on RX):    New/renewal/insurance change PA/secondary ins. PA:  Previously Tried and Failed:    Exedrine migraine helps   Imitrex -never touched headache  Rizatriptan -never worked  Topiramate-did not help  Tylenol alternate with Excedrin migraine    Rationale:  migraine    Insurance   Primary: [employer]  Insurance ID:  [id number]    Pharmacy Information (if different than what is on RX)  Name:    Phone:    Fax:

## 2024-04-03 ENCOUNTER — TELEPHONE (OUTPATIENT)
Dept: NEUROLOGY | Facility: CLINIC | Age: 79
End: 2024-04-03
Payer: COMMERCIAL

## 2024-04-03 NOTE — TELEPHONE ENCOUNTER
Patient Contacted to schedule the following:    Appointment type: Return Headache  Provider: AZEB Clancy CNP  Return date: Around 9/28/2024  Specialty phone number: [phone]  Additional appointment(s) needed: N/A  Additional Notes: N/A    Spoke with patient, scheduled on 9/24.    Kieran Junior on 4/3/2024 at 11:03 AM

## 2024-04-04 DIAGNOSIS — G43.709 CHRONIC MIGRAINE WITHOUT AURA WITHOUT STATUS MIGRAINOSUS, NOT INTRACTABLE: Primary | ICD-10-CM

## 2024-04-10 NOTE — TELEPHONE ENCOUNTER
PA Initiation    Medication: NURTEC 75 MG PO TBDP  Insurance Company: [employer] Non-Specialty PA's - Phone [phone] Fax [phone]  Pharmacy Filling the Rx: [id number] - [place] [address] [address]  Filling Pharmacy Phone: [phone]  Filling Pharmacy Fax: [phone]  Start Date: 4/10/2024

## 2024-04-10 NOTE — TELEPHONE ENCOUNTER
Prior Authorization Approval    Medication: NURTEC 75 MG PO TBDP  Authorization Effective Date: 3/11/2024  Authorization Expiration Date: 4/10/2025  Approved Dose/Quantity:   Reference #:     Insurance Company: [employer] Non-Specialty PA's - Phone [phone] Fax [phone]  Expected CoPay: $    CoPay Card Available:      Financial Assistance Needed:   Which Pharmacy is filling the prescription: [id number] - [place], [place] - [facility] [address]  Pharmacy Notified: YES  Patient Notified: **Instructed pharmacy to notify patient when script is ready to /ship.**

## 2024-04-23 ENCOUNTER — OFFICE VISIT (OUTPATIENT)
Dept: NEUROLOGY | Facility: CLINIC | Age: 79
End: 2024-04-23
Payer: COMMERCIAL

## 2024-04-23 DIAGNOSIS — G43.709 CHRONIC MIGRAINE WITHOUT AURA WITHOUT STATUS MIGRAINOSUS, NOT INTRACTABLE: Primary | ICD-10-CM

## 2024-04-23 PROCEDURE — 64615 CHEMODENERV MUSC MIGRAINE: CPT | Performed by: PSYCHIATRY & NEUROLOGY

## 2024-04-23 NOTE — PROGRESS NOTES
Botulinum Toxin Procedure Note    Mackenzie Brown  [id number]    Ordering provider: La Nena Rolle CNP    The procedure was explained to the patient. Benefits of the treatment were discussed including headache and migraine reduction. Risks of the procedure were reviewed including but not limited to pain, bruising, bleeding, infection, weakness of muscles injected or those distal to injection. The patient voiced understanding of the risks and benefits. All questions answered and the patient consented to proceed.     Prior to receiving Botox injections the patient reported the following:  Baseline headache/migraine frequency: daily x 2 years  Baseline headache/migraine duration: constant  Associated migrainous features: nausea, photophobia    Previous treatment trials:  Topiramate ineffective  On multiple antidepressants can not add TCAs safelt given age and concommittant use of bupropion, paroxetine and trazodone  Nurtec prn    Last Botox procedure: this is first one  Current migraine frequency: see above  Current migraine duration: see above    Functional improvements since starting botulinum toxin treatments this is first one    Last Neurology office visit: 3/28/24    A 200 unit vial of Botox was diluted with 4ml of 0.9% sodium chloride    Botox lot # and expiration date: See MAR for details  0.9% sodium chloride lot # and expiration date: See MAR for details    The following muscles were injected using a 30 gauge needle:  Procerus 1 site 5 units   2 sites (bilat) 10 units  Frontalis 4 sites (bilat) 20 units  Temporalis 8 sites (bilat) 40 units  Trapezius muscles 6 sites (bilat) 30 units  Cervical paraspinals (bilat) 4 sites 20 units  Occipitalis 6 sites (bilat) 30 units    A total of 155 units were injected, 45 wasted.   The patient tolerated the injections well. I was present for the entire procedure directly supervising Dr Heidi Pineda PGY-4.    Clarissa Menard MD

## 2024-04-23 NOTE — LETTER
4/23/2024       RE: Mackenzie Brown  [address]  [place] [facility] [address]       Dear Colleague,    Thank you for referring your patient, Mackenzie Brown, to the [facility] [place] [facility]. Please see a copy of my visit note below.        Botulinum Toxin Procedure Note    Mackenzie Brown  [id number]    Ordering provider: La Nena Rolle CNP    The procedure was explained to the patient. Benefits of the treatment were discussed including headache and migraine reduction. Risks of the procedure were reviewed including but not limited to pain, bruising, bleeding, infection, weakness of muscles injected or those distal to injection. The patient voiced understanding of the risks and benefits. All questions answered and the patient consented to proceed.     Prior to receiving Botox injections the patient reported the following:  Baseline headache/migraine frequency: daily x 2 years  Baseline headache/migraine duration: constant  Associated migrainous features: nausea, photophobia    Previous treatment trials:  Topiramate ineffective  On multiple antidepressants can not add TCAs safelt given age and concommittant use of bupropion, paroxetine and trazodone  Nurtec prn    Last Botox procedure: this is first one  Current migraine frequency: see above  Current migraine duration: see above    Functional improvements since starting botulinum toxin treatments this is first one    Last Neurology office visit: 3/28/24    A 200 unit vial of Botox was diluted with 4ml of 0.9% sodium chloride    Botox lot # and expiration date: See MAR for details  0.9% sodium chloride lot # and expiration date: See MAR for details    The following muscles were injected using a 30 gauge needle:  Procerus 1 site 5 units   2 sites (bilat) 10 units  Frontalis 4 sites (bilat) 20 units  Temporalis 8 sites (bilat) 40 units  Trapezius muscles 6 sites (bilat) 30  units  Cervical paraspinals (bilat) 4 sites 20 units  Occipitalis 6 sites (bilat) 30 units    A total of 155 units were injected, 45 wasted.   The patient tolerated the injections well. I was present for the entire procedure directly supervising Dr Heidi Pineda PGY-4.          Again, thank you for allowing me to participate in the care of your patient.      Sincerely,    Clarissa Menard MD

## 2024-06-09 ENCOUNTER — HEALTH MAINTENANCE LETTER (OUTPATIENT)
Age: 79
End: 2024-06-09

## 2024-06-17 ENCOUNTER — ANCILLARY PROCEDURE (OUTPATIENT)
Dept: GENERAL RADIOLOGY | Facility: CLINIC | Age: 79
End: 2024-06-17
Attending: PHYSICAL MEDICINE & REHABILITATION
Payer: COMMERCIAL

## 2024-06-17 ENCOUNTER — OFFICE VISIT (OUTPATIENT)
Dept: PHYSICAL MEDICINE AND REHAB | Facility: CLINIC | Age: 79
End: 2024-06-17
Attending: NURSE PRACTITIONER
Payer: COMMERCIAL

## 2024-06-17 VITALS
OXYGEN SATURATION: 93 % | SYSTOLIC BLOOD PRESSURE: 149 MMHG | HEART RATE: 92 BPM | DIASTOLIC BLOOD PRESSURE: 73 MMHG | RESPIRATION RATE: 16 BRPM

## 2024-06-17 DIAGNOSIS — M25.512 CHRONIC LEFT SHOULDER PAIN: Primary | ICD-10-CM

## 2024-06-17 DIAGNOSIS — M54.2 CERVICALGIA: ICD-10-CM

## 2024-06-17 DIAGNOSIS — M47.892 OTHER SPONDYLOSIS, CERVICAL REGION: ICD-10-CM

## 2024-06-17 DIAGNOSIS — G89.29 CHRONIC LEFT SHOULDER PAIN: Primary | ICD-10-CM

## 2024-06-17 PROCEDURE — 72040 X-RAY EXAM NECK SPINE 2-3 VW: CPT | Performed by: RADIOLOGY

## 2024-06-17 PROCEDURE — 99204 OFFICE O/P NEW MOD 45 MIN: CPT | Performed by: PHYSICAL MEDICINE & REHABILITATION

## 2024-06-17 NOTE — Clinical Note
Hi, I had ordered an ultrasound-guided left suprascapular nerve block for this patient.  I do not think any of the schedulers had reached out to her.  Looks like we are pretty full next week but could use a blocked time or potentially work around Bill's schedule in the afternoon if possible.  Otherwise, if she is willing to wait I could see her the next time and at [place]. Thanks,

## 2024-06-17 NOTE — PATIENT INSTRUCTIONS
It was a pleasure seeing you today in our [facility]. We discussed the following:    #. Physical Therapy referral    Please work with your primary care physician to arrange an appointment at any of the [facility] outpatient physical therapy locations.  It will be very important for you to do the physical therapy exercises on a regular basis to decrease your pain and prevent future flares of pain.     Ideally, I would like you to complete 4-6 weeks of physical therapy.     #. X-ray: Flexion/extension views of cervical spine    An XR order was added today. You may schedule this at the discharge desk or radiology will call you to schedule. You may also call [facility] Imaging Scheduling directly if you do not hear from them in the next couple of days.    Imaging scheduling  -[facility] [phone] Clinics and Surgery Center [facility] Clinics, including [facility], [facility]  -[facility] [phone] [facility], [facility] Clinics including [facility], and [place]  -[facility] [phone] [facility], [facility], [facility], [facility] clinics, including [place], [facility], and [facility]  -[facility] [phone] [facility], [facility], [facility] Clinics, including [place], [facility], and [place]       #. [facility] [place] Injection Scheduling    An order for a LEFT Suprascapular nerve block procedure at [facility] has been added today.   You should receive a call to arrange the appointment.   You may also call /[phone] if you do not hear from the schedulers in the next couple of days.    Location:  [facility]   Address: [address] [place] [address] [address], [place] [address]

## 2024-06-17 NOTE — NURSING NOTE
Chief Complaint   Patient presents with    New Patient     Here for consult, confirmed with patient     Martha Rolon

## 2024-06-17 NOTE — LETTER
6/17/2024       RE: Mackenzie Brown  [address]  [facility] [id number]     Dear Colleague,    Thank you for referring your patient, Mackenzie Brown, to the [facility] [place] [facility]. Please see a copy of my visit note below.    Patient seen at the request of AZEB Clancy CNP for an opinion and evaluation of neck pain    HISTORY OF PRESENT ILLNESS:  Mackenzie Brown is a 78 year old female who presents with a chief complaint of headaches, neck pain and left shoulder pain.     Pain began  Fall 2022 and is associated with trauma. Tripped on a , falling forward on her body, head, and arm. She developed neck pain after the fall and sustained left shoulder injury.  She was seen at an outside emergency department and per care everywhere she had sustained minimally displaced fracture of the anterior/inferior corner of the C5 vertebral body, as well as, other close displaced fracture of the proximal end of the left humerus which was managed nonoperatively with sling and initial nonweightbearing.  She has followed with orthopedics for her left shoulder issues through [facility].    Currently, she localizes pain and symptoms to the cervical spine relatively midline with radiating symptoms to the scapula and occasionally left shoulder and to the level of the arm.  She attributes some of her radiating symptoms to the shoulder/humerus fracture that she had sustained previously.  Pain score 5/10 today at rest and 8/10 at its worst last week.  Pain is described as constant dull and aching in nature. Symptoms are worse when reading for prolong positioning ; and improved with tylenol. She does denies report pain-related sleep disturbance.     PRIOR INJURIES/TREATMENT:   Ice/Heat: Both help temporary   Brace: cervical collar after the fracture  Physical Therapy:   None      - Current Pain Medications -    Tylenol prn   Tramadol 50mg prn - very rare use. Has not used for a long time.     - Prior/Trialed Pain Medications -   NSAIDs - avoids due to cardiac issues    Prior Procedures:  Complete first round of Botox ~6weeks.     04/16/24 - L GHJ inj w/ US ([facility]) - No sig benefit    Prior Related Surgery: None   Other (acupuncture, OMT, CMM, TENS, DME, etc.):   None    Specialists Seen - (with most recent, available notes and clinic visits reviewed)   1. Neurology - Dr. Menard, La Nena Rolle, APRN CNP      IMAGING - reviewed   10/17/22 MRI C spine ([facility])  Impression:   1. C5 anterior inferior endplate fracture, with associated likely disruption/injury to the anterior longitudinal ligament. The posterior longitudinal, ligamentum flavum and interspinous ligaments do not demonstrate edema signal and are favored to be intact.   2. Additional possible stress reaction or injury versus degenerative change to the anterior inferior C6 vertebral body.   3. At C4-5, mild spinal canal with moderately severe right and mild left neural foraminal narrowing. Potential impingement of the right C5 nerve.   4. At C5-6, mild spinal canal with moderately severe right and moderate left neural foraminal narrowing. Potential impingement of the right C6 nerve.   5. At C6-7, mild spinal canal and moderate bilateral neural foraminal narrowing.     Review Of Systems:  I am responding to those symptoms which are directly relevant to the specific indication for my consultation. I recommend that the patient follow up with their primary or referring provider to pursue any other symptoms which may be of concern.       Medical History:  Problem List  Patient Active Problem List   Diagnosis Code   Migraine, unspecified, without mention of intractable migraine without mention of status migrainosus G43.909   Chronic back pain M54.9, G89.29   Skin cancer C44.90   Diverticulitis of colon (without mention of hemorrhage) K57.32   Personal history of colonic  polyps Z86.010   Generalized anxiety disorder F41.1   A-fib (HC) I48.91   AK (actinic keratosis) L57.0   Senile osteoporosis M81.0   ACP (advance care planning) Z71.89   Mild cognitive impairment G31.84   Multiple thyroid nodules E04.2   Hypertension I10   Knee pain, left M25.562   Cataracts, both eyes H26.9   Adrenal nodule (HC) E27.8   Primary osteoarthritis involving multiple joints M15.9   Spondylosis of lumbar region without myelopathy or radiculopathy M47.816   PAF (paroxysmal atrial fibrillation) (HC) I48.0   Depression, recurrent (HC) F33.9   Diverticular disease of large intestine K57.30   Irritable bowel syndrome with diarrhea K58.0   Lactose intolerance E73.9   CHF (congestive heart failure), NYHA class I, acute, diastolic (HC) I50.31   Concussion S06.0XAA   Postoperative hypothyroidism E89.0   Toxic multinodular goiter E05.20   EVERT on CPAP G47.33   Hypoxemia R09.02   Insomnia G47.00   Chest pain R07.9   Acute heart failure with preserved ejection fraction (HC) I50.31   Acute on chronic heart failure with preserved ejection fraction (HC) I50.33   Tachy-braydon syndrome (HC) I49.5   B12 deficiency E53.8   Chest pain R07.9   GERD (gastroesophageal reflux disease) K21.9   Presence of Watchman left atrial appendage closure device Z95.818   Senile dementia, uncomplicated (HC) F03.90   Chronic heart failure with preserved ejection fraction (HC) I50.32   Closed nondisplaced fracture of fifth cervical vertebra (HC) S12.401A   Acute on chronic respiratory failure with hypoxemia (HC) J96.21   Closed fracture of proximal end of humerus S42.209A   Closed fracture of proximal end of left humerus with routine healing S42.202D   HLD (hyperlipidemia) E78.5   Esophoria H50.51     Mackenzie has a past surgical history that includes appendectomy; hysterectomy; breasts implants (1999); tonsillectomy; colectomy (2010); mr head brain wwo (11/10/2011); Right Foot Surgery (2/21/13); Patella fracture surgery (Left, 4/2015); ORIF  tibial shaft fracture w/ plates and screws (Left, 4/2015); Wrist fracture surgery (Left, ~2009); Total knee arthroplasty (Left, 10/27/2015); Total thyroidectomy (02/23/2021); ablation; pacemaker generator; and (ia) [facility] watchman lt atrial appendage insert (03/30/2022).    Family History  Her family history is not on file.     Social History:  Work: Retired. Owned a business   Current living situation:    She  reports that she has never smoked. She has never used smokeless tobacco. She reports current alcohol use. She reports that she does not use drugs.        Current Medications:   She has a current medication list which includes the following prescription(s): acetaminophen, amlodipine, aspirin-acetaminophen-caffeine, bupropion, citracal slow release, cholecalciferol, cyanocobalamin, donepezil, furosemide, irbesartan, levothyroxine, loperamide, nitroglycerin, pantoprazole sodium, paroxetine, rimegepant, sennosides, tramadol, and trazodone, and the following Facility-Administered Medications: botulinum toxin type a and botulinum toxin type a.     Allergies:    -- Demerol [Meperidine] -- Unknown   -- Levofloxacin -- Unknown   -- Metronidazole -- Unknown    PHYSICAL EXAMINATION:  BP (!) 149/73 (BP Location: Right arm, Patient Position: Sitting, Cuff Size: Adult Regular)   Pulse 92   Resp 16   SpO2 93%    General: Pleasant, straightforward, WDWN individual.  Mental Status: Pleasant, direct, appropriate mood and affect  Resp: breathing is unlabored without audible wheeze  Vascular: No cyanosis   Heme: No visible ecchymosis or erythema on extremities  Skin: No notable rash    Neurologic:  Strength: All major muscle groups of the bilateral upper extremities have normal and symmetric muscle strength EXCEPT left shoulder FF and abd w/ pain limitations     Sensation: SILT in upper extremities bilaterally C5-T1     DTRs: bilateral upper extremity stretch reflexes are equal and symmetric      Musculoskeletal:  Cervical Spine: ROM mild-mod reduced in all planes, Posture kyphotic w/ forward neck protrusion, Tender over bilateral cervical paraspinals, trapezius, and levator scapulae muscles. Spurling negative.      Left Shoulder Exam: ROM full but with end range pain and positive impingement.  Scapular kinetics abml.  Tender left lateral and anterior shoulder, Pain with supraspinatus activation.  Empty can, Jaret-Hawkin tests and Neers sign pos        ASSESSMENT:  Mackenzie Brown is a pleasant 78 year old female who presents with:    #. Cervicalgia  #. Other spondylosis, cervical region   #. Traumatic minimally displaced fracture of the anterior/inferior corner of the C5 vertebral body (2022)  #. Chronic left shoulder pain in the setting of underlying GHJ OA and closed, displaced fracture of the proximal end of the left humerus (2022) managed nonoperatively.     Complicating co-morbidities include:   #. Chronic migraine headaches. Follows with neurology  #. CHF, paroxysmal A-fib + PPM?   #. IBS  #. Depression   #. Polyarthralgia     PLAN:  -Refer to PT. patient requests referral to [facility] closer to home  -X-ray cervical spine flexion/extension views  -Refer for ultrasound-guided left suprascapular nerve block.   -Follow up for procedure.     Ready to learn, no apparent learning barriers.  Education provided on treatment plan according to patient's preferred learning style.  Patient verbalizes understanding.   __________________________________      I spent a total of 45 minutes on the day of the visit.   Time spent by me doing chart review, history and exam, documentation and further activities per the note          Again, thank you for allowing me to participate in the care of your patient.      Sincerely,    Emily Patel MD

## 2024-06-17 NOTE — PROGRESS NOTES
Patient seen at the request of AZEB Clancy CNP for an opinion and evaluation of neck pain    HISTORY OF PRESENT ILLNESS:  Mackenzie Brown is a 78 year old female who presents with a chief complaint of headaches, neck pain and left shoulder pain.     Pain began  Fall 2022 and is associated with trauma. Tripped on a , falling forward on her body, head, and arm. She developed neck pain after the fall and sustained left shoulder injury.  She was seen at an outside emergency department and per care everywhere she had sustained minimally displaced fracture of the anterior/inferior corner of the C5 vertebral body, as well as, other close displaced fracture of the proximal end of the left humerus which was managed nonoperatively with sling and initial nonweightbearing.  She has followed with orthopedics for her left shoulder issues through [facility].    Currently, she localizes pain and symptoms to the cervical spine relatively midline with radiating symptoms to the scapula and occasionally left shoulder and to the level of the arm.  She attributes some of her radiating symptoms to the shoulder/humerus fracture that she had sustained previously.  Pain score 5/10 today at rest and 8/10 at its worst last week.  Pain is described as constant dull and aching in nature. Symptoms are worse when reading for prolong positioning ; and improved with tylenol. She does denies report pain-related sleep disturbance.     PRIOR INJURIES/TREATMENT:   Ice/Heat: Both help temporary   Brace: cervical collar after the fracture  Physical Therapy:   None      - Current Pain Medications -   Tylenol prn   Tramadol 50mg prn - very rare use. Has not used for a long time.     - Prior/Trialed Pain Medications -   NSAIDs - avoids due to cardiac issues    Prior Procedures:  Complete first round of Botox ~6weeks.     04/16/24 - L GHJ inj w/ US ([facility]) - No sig benefit    Prior Related Surgery: None   Other (acupuncture, OMT, CMM, TENS, DME,  etc.):   None    Specialists Seen - (with most recent, available notes and clinic visits reviewed)   1. Neurology - Dr. Menard, La Nena Rolle, APRN CNP      IMAGING - reviewed   10/17/22 MRI C spine ([facility])  Impression:   1. C5 anterior inferior endplate fracture, with associated likely disruption/injury to the anterior longitudinal ligament. The posterior longitudinal, ligamentum flavum and interspinous ligaments do not demonstrate edema signal and are favored to be intact.   2. Additional possible stress reaction or injury versus degenerative change to the anterior inferior C6 vertebral body.   3. At C4-5, mild spinal canal with moderately severe right and mild left neural foraminal narrowing. Potential impingement of the right C5 nerve.   4. At C5-6, mild spinal canal with moderately severe right and moderate left neural foraminal narrowing. Potential impingement of the right C6 nerve.   5. At C6-7, mild spinal canal and moderate bilateral neural foraminal narrowing.     Review Of Systems:  I am responding to those symptoms which are directly relevant to the specific indication for my consultation. I recommend that the patient follow up with their primary or referring provider to pursue any other symptoms which may be of concern.       Medical History:  Problem List  Patient Active Problem List   Diagnosis Code   Migraine, unspecified, without mention of intractable migraine without mention of status migrainosus G43.909   Chronic back pain M54.9, G89.29   Skin cancer C44.90   Diverticulitis of colon (without mention of hemorrhage) K57.32   Personal history of colonic polyps Z86.010   Generalized anxiety disorder F41.1   A-fib (HC) I48.91   AK (actinic keratosis) L57.0   Senile osteoporosis M81.0   ACP (advance care planning) Z71.89   Mild cognitive impairment G31.84   Multiple thyroid nodules E04.2   Hypertension I10   Knee pain, left M25.562   Cataracts, both eyes H26.9   Adrenal nodule (HC) E27.8   Primary  osteoarthritis involving multiple joints M15.9   Spondylosis of lumbar region without myelopathy or radiculopathy M47.816   PAF (paroxysmal atrial fibrillation) (HC) I48.0   Depression, recurrent (HC) F33.9   Diverticular disease of large intestine K57.30   Irritable bowel syndrome with diarrhea K58.0   Lactose intolerance E73.9   CHF (congestive heart failure), NYHA class I, acute, diastolic (HC) I50.31   Concussion S06.0XAA   Postoperative hypothyroidism E89.0   Toxic multinodular goiter E05.20   EVERT on CPAP G47.33   Hypoxemia R09.02   Insomnia G47.00   Chest pain R07.9   Acute heart failure with preserved ejection fraction (HC) I50.31   Acute on chronic heart failure with preserved ejection fraction (HC) I50.33   Tachy-braydon syndrome (HC) I49.5   B12 deficiency E53.8   Chest pain R07.9   GERD (gastroesophageal reflux disease) K21.9   Presence of Watchman left atrial appendage closure device Z95.818   Senile dementia, uncomplicated (HC) F03.90   Chronic heart failure with preserved ejection fraction (HC) I50.32   Closed nondisplaced fracture of fifth cervical vertebra (HC) S12.401A   Acute on chronic respiratory failure with hypoxemia (HC) J96.21   Closed fracture of proximal end of humerus S42.209A   Closed fracture of proximal end of left humerus with routine healing S42.202D   HLD (hyperlipidemia) E78.5   Esophoria H50.51     Mackenzie has a past surgical history that includes appendectomy; hysterectomy; breasts implants (1999); tonsillectomy; colectomy (2010); mr head brain wwo (11/10/2011); Right Foot Surgery (2/21/13); Patella fracture surgery (Left, 4/2015); ORIF tibial shaft fracture w/ plates and screws (Left, 4/2015); Wrist fracture surgery (Left, ~2009); Total knee arthroplasty (Left, 10/27/2015); Total thyroidectomy (02/23/2021); ablation; pacemaker generator; and (ia) [facility] watchman lt atrial appendage insert (03/30/2022).    Family History  Her family history is not on file.     Social History:  Work:  Retired. Owned a business   Current living situation:    She  reports that she has never smoked. She has never used smokeless tobacco. She reports current alcohol use. She reports that she does not use drugs.        Current Medications:   She has a current medication list which includes the following prescription(s): acetaminophen, amlodipine, aspirin-acetaminophen-caffeine, bupropion, citracal slow release, cholecalciferol, cyanocobalamin, donepezil, furosemide, irbesartan, levothyroxine, loperamide, nitroglycerin, pantoprazole sodium, paroxetine, rimegepant, sennosides, tramadol, and trazodone, and the following Facility-Administered Medications: botulinum toxin type a and botulinum toxin type a.     Allergies:    -- Demerol [Meperidine] -- Unknown   -- Levofloxacin -- Unknown   -- Metronidazole -- Unknown    PHYSICAL EXAMINATION:  BP (!) 149/73 (BP Location: Right arm, Patient Position: Sitting, Cuff Size: Adult Regular)   Pulse 92   Resp 16   SpO2 93%    General: Pleasant, straightforward, WDWN individual.  Mental Status: Pleasant, direct, appropriate mood and affect  Resp: breathing is unlabored without audible wheeze  Vascular: No cyanosis   Heme: No visible ecchymosis or erythema on extremities  Skin: No notable rash    Neurologic:  Strength: All major muscle groups of the bilateral upper extremities have normal and symmetric muscle strength EXCEPT left shoulder FF and abd w/ pain limitations     Sensation: SILT in upper extremities bilaterally C5-T1     DTRs: bilateral upper extremity stretch reflexes are equal and symmetric     Musculoskeletal:  Cervical Spine: ROM mild-mod reduced in all planes, Posture kyphotic w/ forward neck protrusion, Tender over bilateral cervical paraspinals, trapezius, and levator scapulae muscles. Spurling negative.      Left Shoulder Exam: ROM full but with end range pain and positive impingement.  Scapular kinetics abml.  Tender left lateral and anterior shoulder, Pain  with supraspinatus activation.  Empty can, Jaret-Hawkin tests and Neers sign pos        ASSESSMENT:  Mackenzie Brown is a pleasant 78 year old female who presents with:    #. Cervicalgia  #. Other spondylosis, cervical region   #. Traumatic minimally displaced fracture of the anterior/inferior corner of the C5 vertebral body (2022)  #. Chronic left shoulder pain in the setting of underlying GHJ OA and closed, displaced fracture of the proximal end of the left humerus (2022) managed nonoperatively.     Complicating co-morbidities include:   #. Chronic migraine headaches. Follows with neurology  #. CHF, paroxysmal A-fib + PPM?   #. IBS  #. Depression   #. Polyarthralgia     PLAN:  -Refer to PT. patient requests referral to [facility] closer to home  -X-ray cervical spine flexion/extension views  -Refer for ultrasound-guided left suprascapular nerve block.   -Follow up for procedure.     Ready to learn, no apparent learning barriers.  Education provided on treatment plan according to patient's preferred learning style.  Patient verbalizes understanding.   __________________________________  Emily Patel MD  Physical Medicine & Rehabilitation        I spent a total of 45 minutes on the day of the visit.   Time spent by me doing chart review, history and exam, documentation and further activities per the note

## 2024-06-24 DIAGNOSIS — M25.512 CHRONIC LEFT SHOULDER PAIN: Primary | ICD-10-CM

## 2024-06-24 DIAGNOSIS — G89.29 CHRONIC LEFT SHOULDER PAIN: Primary | ICD-10-CM

## 2024-07-11 ENCOUNTER — RADIOLOGY INJECTION OFFICE VISIT (OUTPATIENT)
Dept: PHYSICAL MEDICINE AND REHAB | Facility: CLINIC | Age: 79
End: 2024-07-11
Attending: PHYSICAL MEDICINE & REHABILITATION
Payer: COMMERCIAL

## 2024-07-11 VITALS
OXYGEN SATURATION: 90 % | RESPIRATION RATE: 16 BRPM | TEMPERATURE: 99.4 F | DIASTOLIC BLOOD PRESSURE: 68 MMHG | HEART RATE: 95 BPM | SYSTOLIC BLOOD PRESSURE: 114 MMHG

## 2024-07-11 DIAGNOSIS — M25.512 CHRONIC LEFT SHOULDER PAIN: ICD-10-CM

## 2024-07-11 DIAGNOSIS — G89.29 CHRONIC LEFT SHOULDER PAIN: ICD-10-CM

## 2024-07-11 PROCEDURE — 64418 NJX AA&/STRD SPRSCAP NRV: CPT | Mod: LT | Performed by: PHYSICAL MEDICINE & REHABILITATION

## 2024-07-11 PROCEDURE — 76942 ECHO GUIDE FOR BIOPSY: CPT | Performed by: PHYSICAL MEDICINE & REHABILITATION

## 2024-07-11 RX ORDER — BUPIVACAINE HYDROCHLORIDE 2.5 MG/ML
INJECTION, SOLUTION EPIDURAL; INFILTRATION; INTRACAUDAL
Status: COMPLETED | OUTPATIENT
Start: 2024-07-11 | End: 2024-07-11

## 2024-07-11 RX ORDER — TRIAMCINOLONE ACETONIDE 40 MG/ML
INJECTION, SUSPENSION INTRA-ARTICULAR; INTRAMUSCULAR
Status: COMPLETED | OUTPATIENT
Start: 2024-07-11 | End: 2024-07-11

## 2024-07-11 RX ORDER — LIDOCAINE HYDROCHLORIDE 10 MG/ML
INJECTION, SOLUTION EPIDURAL; INFILTRATION; INTRACAUDAL; PERINEURAL
Status: COMPLETED | OUTPATIENT
Start: 2024-07-11 | End: 2024-07-11

## 2024-07-11 RX ADMIN — BUPIVACAINE HYDROCHLORIDE 2 ML: 2.5 INJECTION, SOLUTION EPIDURAL; INFILTRATION; INTRACAUDAL at 09:51

## 2024-07-11 RX ADMIN — LIDOCAINE HYDROCHLORIDE 4 ML: 10 INJECTION, SOLUTION EPIDURAL; INFILTRATION; INTRACAUDAL; PERINEURAL at 09:50

## 2024-07-11 RX ADMIN — TRIAMCINOLONE ACETONIDE 40 MG: 40 INJECTION, SUSPENSION INTRA-ARTICULAR; INTRAMUSCULAR at 09:51

## 2024-07-11 ASSESSMENT — PAIN SCALES - GENERAL
PAINLEVEL: SEVERE PAIN (6)
PAINLEVEL: EXTREME PAIN (8)

## 2024-07-11 NOTE — PATIENT INSTRUCTIONS
DISCHARGE INSTRUCTIONS    During office hours (8:00 a.m.- 4:00 p.m.) questions or concerns may be answered  by calling [facility] Navigation Nurses at  [phone].  Messages received after hours will be returned the following business day.      In the case of an emergency, please dial 911 or seek assistance at the nearest Emergency Room/Urgent Care facility.     All Patients:    You may experience an increase in your symptoms for the first 2 days, once the numbing medication wears off.    You may resume your regular medication    You may shower. No swimming, tub bath or hot tub for 24 hours               POSSIBLE PROCEDURE SIDE EFFECTS  -Call Spine Center if concerned-    Increased Pain  Increased numbness/tingling     Nausea/Vomiting  Bruising/bleeding at site (hematoma)             Swelling at site (edema) Headache  Difficulty walking  Infection        Fever greater than 100.5

## 2024-07-17 ENCOUNTER — LAB (OUTPATIENT)
Dept: LAB | Facility: CLINIC | Age: 79
End: 2024-07-17
Payer: COMMERCIAL

## 2024-07-17 ENCOUNTER — OFFICE VISIT (OUTPATIENT)
Dept: RHEUMATOLOGY | Facility: CLINIC | Age: 79
End: 2024-07-17
Payer: COMMERCIAL

## 2024-07-17 VITALS
BODY MASS INDEX: 28.92 KG/M2 | SYSTOLIC BLOOD PRESSURE: 112 MMHG | HEART RATE: 96 BPM | DIASTOLIC BLOOD PRESSURE: 65 MMHG | WEIGHT: 168.5 LBS | OXYGEN SATURATION: 95 %

## 2024-07-17 DIAGNOSIS — M25.50 POLYARTHRALGIA: ICD-10-CM

## 2024-07-17 DIAGNOSIS — R76.8 ANA POSITIVE: ICD-10-CM

## 2024-07-17 DIAGNOSIS — M15.0 PRIMARY OSTEOARTHRITIS INVOLVING MULTIPLE JOINTS: ICD-10-CM

## 2024-07-17 DIAGNOSIS — R76.8 ANA POSITIVE: Primary | ICD-10-CM

## 2024-07-17 PROCEDURE — 86235 NUCLEAR ANTIGEN ANTIBODY: CPT

## 2024-07-17 PROCEDURE — G2211 COMPLEX E/M VISIT ADD ON: HCPCS | Performed by: INTERNAL MEDICINE

## 2024-07-17 PROCEDURE — 99204 OFFICE O/P NEW MOD 45 MIN: CPT | Performed by: INTERNAL MEDICINE

## 2024-07-17 PROCEDURE — 86225 DNA ANTIBODY NATIVE: CPT

## 2024-07-17 PROCEDURE — 86160 COMPLEMENT ANTIGEN: CPT

## 2024-07-17 PROCEDURE — 36415 COLL VENOUS BLD VENIPUNCTURE: CPT

## 2024-07-17 RX ORDER — ROSUVASTATIN CALCIUM 20 MG/1
20 TABLET, COATED ORAL DAILY
COMMUNITY

## 2024-07-17 NOTE — PROGRESS NOTES
This document was created using a software with less than 100% fidelity, at times resulting in unintended, even erroneous syntax and grammar.  The reader is advised to keep this under consideration while reviewing, interpreting this note.      Rheumatology Consult Note      Mackenzie Brown     YOB: 1945 Age: 78 year old    Date of visit: 7/17/24    PCP: Quinn Lainez    Chief Complaint   Patient presents with:  Consult      Assessment and Plan     Joana was seen today for consult.    Diagnoses and all orders for this visit:    NICOLA positive  -     ALDA antibody panel; Future  -     DNA double stranded antibodies; Future  -     Complement C3; Future  -     Complement C4; Future    Primary osteoarthritis involving multiple joints    Polyarthralgia           This patient with nausea headaches diarrhea fatigue, positive NICOLA at 1: 160, 1: 320 various occasions negative dsDNA in the past, has majority of symptoms which Typically are not considered diagnostic for connective tissue disease such as SLE though the nonspecificity of those are outlined.  Today I have outlined to her what NICOLA stands for.  We discussed the risks of development of lupus in patients to have positive NICOLA.  She also has widespread osteoarthritis.  We discussed the management principles thereof.  She will follow-up for this per her symptoms..  It is conceivable that many of her symptoms may be associated with nonrestorative sleep and she is as noted working with her sleep physicians on that.         HPI   Mackenzie Brown is a 78 year old female  is seen today for evaluation of multiplicity of symptoms in the background of positive NICOLA, accompanied by her daughter.  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  She reports symptoms going back at least 2 years which include nausea, headache, diarrhea off and on, and fatigue.  These have been such a bother for her that she has  "looked into various aspects of the thyroid was looked into she has had workup which revealed a positive NICOLA.  She was seen in rheumatology at [facility] and 2022.  She has noted no rash, photosensitivity, features of stomatitis, pleuritic symptoms.  As far as she knows there has been no DVT, PE.  She had as noted 5 pregnancies no miscarriages.  No history of seizure disorder.  As best as she knows there is no kidney issues.  She might have had Raynaud's on 2 occasions couple of parada ago.  She is known to have sleep apnea, however she finds it very hard to tolerate the CPAP and is going to be seeing her sleep doctors office shortly.  She has had Botox injections for her headaches.  She has noted pain in the left shoulder at 1 point she was told that this is \"bone-on-bone and she should go for arthroplasty she sought second opinion and was suggested that she should try conservative measures first including corticosteroid injections of which she has had 3 the first 2 were effective the #3 did not help her.  She has undergone physical therapy for that.  Off-and-on she has had pain in her joints in the hands specially pointing to the PIPs when this is flaring up she has difficult time even light touch the.  She had a fall in 2014 off a ladder and was injured in the left knee that is still troubles her.  She is status post TKA on the left.  She unfortunately had another fall 2-1/2 years ago where she had \"broke her arm, and neck.  That has since healed with residual pain as noted earlier.  She is not a smoker hardly ever takes alcohol.  One of her daughter has SLE.  She noted that her father may have the same.             ASSESSMENT/PLAN:    1. Fatigue. Can be multifactorial. Has no clinical evidence of autoimmune process. Previously had negative work-up on multiple occasions by rheumatology  2. Nausea. Again no evidence of autoimmune concern. We will check some additional labs to further evaluate.  3. Positive NICOLA. " Can be positive in 25% of general population. Also has family worse with autoimmune condition which can make this positive. History of thyroid issues can also give a positive NICOLA. We will check some additional antibodies for completeness.      The patient understood the rational for diagnosis and treatment plan. All questions were answered to the best of my ability and the patient's satisfaction.    I have reviewed all pertinent investigations including labs, imaging, and outside records, if relevant.     Patient was informed that the investigations done today would be [facility] if they are active. If they are inactive, a letter will be sent with the results. For abnormal results which require further intervention or follow up, patient will receive a call from this office.    Thank you for involving me in the care of the patient. If there are any questions or concerns, please do not hesitate to contact me at [facility].    Aurora Dozier MD .................... 11/18/2022 11:26 AM  Patient seen with above resident. Independent assessment and physical exam conducted by myself. Agree with above findings. Agree with assessment and plan.  Marni Ring DO.........11/18/2022 3:30 PM  Rheumatology Lead Physician  [facility]       In 2018 had positive Anti-nuclear antibody 1:160 homogeneous and normal CRP. In 2019 had slightly low c3 and C4, negative DNA antibody, and CRP of 1.05 in January 2020. Rheumatoid factor negative in 2018 and 2016.    Rapid 3 assessment: functional score:3.3 Pain score:5.5 Patient global assessment:5. Cumulative score 13.8. Rapid 3 score: 4.6  (NR 0 - 1; Low severity 1.3 - 2; MS 2.3 - 4, and high severity 4.3 - 10)    A: it appears likely that the majority of the symptoms are related to osteoarthritis. May have certain aspect of fibromyalgia like chronic pain involved as well. Has this in both shoulders, multilevel DDD in neck, hands.  Her description of  right hand and wrist would raise a possibility of CPPD deposition disease as an explanation (pseudogout accompanying osteoarthritis).    P: discussed osteoarthritis and it's treatment. Try cyclobenzaprine 5mg at bedtime. Cut trazodone to 50mg at bedtime. Trial topical diclofenac for hands and other painful areas. X-ray hands to rule out evidence of pseudogout, or other process.  Total time 30min . Counseling/coordination of care 20min . Over 50% of the total time was spent on counseling for the following issues Disease/medication management .  Will refer to pain clinic as well.  Electronically signed by Mook Garcia MD at 07/29/2020 2:22 PM CDT   Active Problem List     Patient Active Problem List   Diagnosis    Adrenal nodule (H24)    PAF (paroxysmal atrial fibrillation) (H)    AK (actinic keratosis)    Amnesia    Blood typing encounter    Bunion    Cataracts, both eyes    Chronic heart failure with preserved ejection fraction (H)    Chronic back pain    Chronic headache disorder    Complications of medical care    Depression with anxiety    Diverticular disease of colon    Diverticulitis    Diverticulitis of colon    Eczema    Fibula fracture    GERD (gastroesophageal reflux disease)    History of colonic polyps    Hypertension    Insomnia    Irritable bowel syndrome with diarrhea    Knee pain, left    Lactose intolerance    Major depressive disorder, recurrent episode, mild (H24)    Malignant neoplasm of skin    Migraine    Mild cognitive impairment    Toxic multinodular goiter    Obesity    Osteoarthrosis    Primary osteoarthritis involving multiple joints    Senile osteoporosis    Sensorineural hearing loss, asymmetrical    Spondylosis of lumbar region without myelopathy or radiculopathy    Tibial plateau fracture    Wrist fracture, left    Neck pain    Hemorrhage of rectum and anus    Postoperative hypothyroidism    Tachy-braydon syndrome (H)    EVERT on CPAP    Irregular bowel habits    Hypoxemia     Hemorrhoids    Concussion    Chest pain    Change in bowel habits    B12 deficiency    Senile dementia, uncomplicated (H)    Presence of Watchman left atrial appendage closure device    Closed nondisplaced fracture of fifth cervical vertebra (H)    Closed fracture of proximal end of humerus    Acute on chronic respiratory failure with hypoxemia (H)     Past Medical History   No past medical history on file.  Past Surgical History   No past surgical history on file.  Allergy     Allergies   Allergen Reactions    Demerol [Meperidine] Unknown    Levofloxacin Unknown    Metronidazole Unknown     Current Medication List     Current Outpatient Medications   Medication Sig Dispense Refill    acetaminophen (TYLENOL) 500 MG tablet Take 1,000 mg by mouth 4 times daily      amLODIPine (NORVASC) 5 MG tablet Take 5 mg by mouth daily      aspirin-acetaminophen-caffeine (EXCEDRIN MIGRAINE) 250-250-65 MG tablet Take 1 tablet by mouth daily as needed for headaches      buPROPion (WELLBUTRIN XL) 150 MG 24 hr tablet Take 150 mg by mouth every morning      Calcium 200 MG TABS Take 600 mg by mouth      Calcium-Magnesium-Vitamin D (CITRACAL SLOW RELEASE) 600- MG-MG-UNIT TB24 Take 600 mg by mouth      cholecalciferol 50 MCG (2000 UT) CAPS Take 2,000 Units by mouth      Cyanocobalamin 1000 MCG CAPS Take 1,000 mcg by mouth daily      donepezil (ARICEPT) 10 MG tablet       furosemide (LASIX) 20 MG tablet Take 20 mg by mouth daily Take 0.5 tab every day.      irbesartan (AVAPRO) 150 MG tablet Take 1 tablet (150 mg) by mouth At Bedtime      levothyroxine (SYNTHROID/LEVOTHROID) 125 MCG tablet Take 1 tablet (125 mcg) by mouth daily      nitroGLYcerin (NITROSTAT) 0.4 MG sublingual tablet Place 1 tablet (0.4 mg) under the tongue every 5 minutes as needed for chest pain For chest pain place 1 tablet under the tongue every 5 minutes for 3 doses. If symptoms persist 5 minutes after 1st dose call 911.      pantoprazole sodium (PROTONIX) 40 MG  packet Take 1 packet (40 mg) by mouth daily      PARoxetine (PAXIL) 40 MG tablet Take 1 tablet (40 mg) by mouth every morning      rimegepant (NURTEC) 75 MG ODT tablet Place 1 tablet (75 mg) under the tongue daily as needed for migraine Maximum of 1 tablet every 24 hours. 8 tablet 11    rosuvastatin (CRESTOR) 20 MG tablet Take 20 mg by mouth daily      traMADol (ULTRAM) 50 MG tablet Take 50 mg by mouth every 6 hours as needed for severe pain      traZODone (DESYREL) 50 MG tablet Take 1 tablet (50 mg) by mouth At Bedtime       Current Facility-Administered Medications   Medication Dose Route Frequency Provider Last Rate Last Admin    Botulinum Toxin Type A (BOTOX) 200 units injection 155 Units  155 Units Intramuscular See Admin Instructions Clarissa Menard MD   155 Units at 04/23/24 0813    Botulinum Toxin Type A (BOTOX) 200 units injection 155 Units  155 Units Intramuscular See Admin Instructions La Nena Rolle APRN CNP           Current Facility-Administered Medications   Medication Dose Route Frequency Provider Last Rate Last Admin        Family History   No family history on file.      Physical Exam     COMPREHENSIVE EXAMINATION:  Vitals:    07/17/24 1350   BP: 112/65   BP Location: Right arm   Pulse: 96   SpO2: 95%   Weight: 76.4 kg (168 lb 8 oz)     A well appearing alert oriented female. Vital data as noted above. Her eyes examined for inflammation/scleromalacia. ENT examined for oral mucositis, moisture, thrush, nasal deformity, external ear redness, deformity. Her neck is examined for suppleness and lymphadenopathy.  Cardiopulmonary examination without dyspnea at rest, use of accessory muscles of breathing, wheezing, edema, peripheral or central cyanosis.  Abdomen examined for softness, tenderness, obvious organomegaly.  Skin examined for heliotrope, malar area eruption, lupus pernio, periungual erythema, sclerodactyly, papules, erythema nodosum, purpura, nail pitting,  "onycholysis, and obvious psoriasis lesion. Neurological examination done for alertness, speech, facial symmetry,  tone and power in upper and lower extremities, and gait. The joint examination is performed for swelling, tenderness, warmth, erythema, and range of motion in the following joints: DIPs, PIPs, MCPs, wrists, first CMC's, elbows, shoulders, hips, knees, ankles, feet; spine for range of motion and paraspinal muscles for tenderness. The salient normal / abnormal findings are appen she has widespread changes in her fingers joints including ded. Heberden's and Shoaib's nodes she has squaring of the first CMC's, she has impingement of the left shoulder unable to abduct beyond 80 degrees on the left side full on the right, she is tender in the left trochanteric area not so on the right.  Joint line tenderness left knee.  She has edema of the right lower extremity.  She does not have sclerodactyly periungual erythema she does not have stomatitis she has no Maller area eruption.     Labs / Imaging (select studies)     No results found for: \"PPDINDURATIO\", \"PPDREDNESS\", \"TBGOLT\", \"RHF\", \"CCPABG\", \"URIC\", \"JX42173\", \"XB187868\", \"ANTIDNA\", \"ANTIDNAINT\", \"CARIA\", \"SK46149\", \"TT142780\", \"ENASM\", \"ENASCL\", \"JO1\", \"ENASSA\", \"ENASSB\", \"CENTA\", \"O6HYRMN\", \"Q0ILDYB\", \"QP4205\"   Hemoglobin   Date Value Ref Range Status   10/28/2022 12.2 11.7 - 15.7 g/dL Final   10/13/2019 13.4 12.0 - 16.0 g/dL Final   02/04/2008 12.5 11.7 - 15.7 g/dL Final     Urea Nitrogen   Date Value Ref Range Status   10/27/2022 6.8 (L) 8.0 - 23.0 mg/dL Final   10/17/2022 7.5 (L) 8.0 - 23.0 mg/dL Final   10/14/2019 12 8 - 28 mg/dL Final   10/13/2019 7 (L) 8 - 28 mg/dL Final     Sed Rate   Date Value Ref Range Status   02/04/2008 15 0 - 30 mm/h Final     CRP Inflammation   Date Value Ref Range Status   02/04/2008 66.2 (H) 0.0 - 8.0 mg/L Final     Albumin   Date Value Ref Range Status   10/13/2019 3.8 3.5 - 5.0 g/dL Final          Immunization History "     Immunization History   Administered Date(s) Administered    COVID-19 Bivalent 12+ (Pfizer) 09/15/2022    COVID-19 MONOVALENT 12+ (Pfizer) 02/11/2021, 03/04/2021    COVID-19 Monovalent 12+ (Pfizer 2022) 07/15/2022    DT (PEDS <7y) 01/01/1997    Flu 65+ Years 09/05/2017, 09/27/2018    Flu, Unspecified 10/05/2010, 09/13/2011, 11/01/2016    [id number] Novel Flu 01/11/2010    Influenza (H1N1) 01/11/2010    Influenza (High Dose) 3 valent vaccine 10/23/2015, 11/01/2016, 09/27/2018    Influenza (IIV3) PF 12/02/2003, 02/08/2006, 09/15/2009, 10/05/2010, 09/13/2011, 10/10/2012, 12/04/2013, 10/15/2014    Influenza Vaccine 18-64 (Flublok) 10/11/2019    Influenza Vaccine 65+ (FLUAD) 10/19/2020, 11/17/2021, 09/15/2022    Influenza Vaccine >6 months,quad, PF 10/11/2019    Influenza, seasonal, injectable, PF 09/13/2011    Pneumo Conj 13-V (2010&after) 06/01/2015    Pneumococcal 23 valent 06/18/2009, 12/30/2009    TD,PF 7+ (Tenivac) 06/18/2009    TDAP (Adacel,Boostrix) 02/11/2013    Td (Adult), Adsorbed 01/01/1997, 12/11/1998, 06/18/2009    Tdap (Adult) Unspecified Formulation 01/01/1997    Zoster recombinant adjuvanted (SHINGRIX) 07/22/2019, 10/11/2019, 12/01/2019    Zoster vaccine, live 01/24/2011

## 2024-07-18 LAB
C3 SERPL-MCNC: 106 MG/DL (ref 81–157)
C4 SERPL-MCNC: 15 MG/DL (ref 13–39)
DSDNA AB SER-ACNC: 1.7 IU/ML
ENA SM IGG SER IA-ACNC: <0.7 U/ML
ENA SM IGG SER IA-ACNC: NEGATIVE
ENA SS-A AB SER IA-ACNC: <0.5 U/ML
ENA SS-A AB SER IA-ACNC: NEGATIVE
ENA SS-B IGG SER IA-ACNC: <0.6 U/ML
ENA SS-B IGG SER IA-ACNC: NEGATIVE
U1 SNRNP IGG SER IA-ACNC: 1.7 U/ML
U1 SNRNP IGG SER IA-ACNC: NEGATIVE

## 2024-07-25 ENCOUNTER — TELEPHONE (OUTPATIENT)
Dept: PHYSICAL MEDICINE AND REHAB | Facility: CLINIC | Age: 79
End: 2024-07-25
Payer: COMMERCIAL

## 2024-07-25 NOTE — TELEPHONE ENCOUNTER
Left Voicemail (2nd Attempt) for the patient to call back and schedule the following:    Appointment type: Return Med Spine  Provider: Susana Martinez NP  Return date: On 9/20/24 or first available after  Specialty phone number: [phone]  Additional appointment(s) needed: NA  Additonal Notes: This will replace her follow up with Dr Patel on 9/16, please cancel once this is scheduled.     OK to follow up with this provider this time per Laron in clinic, video or in person is ok.    Petrona Casas on 7/25/2024 at 11:25 AM

## 2024-08-13 ENCOUNTER — TELEPHONE (OUTPATIENT)
Dept: NEUROLOGY | Facility: CLINIC | Age: 79
End: 2024-08-13
Payer: COMMERCIAL

## 2024-08-13 NOTE — TELEPHONE ENCOUNTER
Left Voicemail (1st Attempt) for the patient to call back and schedule the following:    Appointment type: Resched Sept Appt  Provider: La Nena Rolle  Return date: Oct 14, 2024 @ 12:30  Specialty phone number: [phone]  Additional appointment(s) needed:   Additonal Notes:     Please offer the pt Oct 14, 2024 @ 12:30 for a In Person appt with La Nena Rolle Per Alyssa BROWNLEE.    Appt is on hold, please manually sched and remove the hold

## 2024-08-16 ENCOUNTER — TELEPHONE (OUTPATIENT)
Dept: NEUROLOGY | Facility: CLINIC | Age: 79
End: 2024-08-16
Payer: COMMERCIAL

## 2024-08-16 NOTE — TELEPHONE ENCOUNTER
Sent Mychart (1st Attempt) and Left Voicemail (2nd Attempt) for the patient to call back and schedule the following:    Appointment type: Resched Sept In Person appt  Provider: La Nena Rolle  Return date: 10/14/24 @12:30  Specialty phone number: [phone]  Additional appointment(s) needed:   Additonal Notes:     Please assist with manually scheduling the pt in the held slot on 10/14/24 at 12:30 for a In Person appt with La Nena Rolle. Remove the hold once scheduled.

## 2024-09-03 ENCOUNTER — OFFICE VISIT (OUTPATIENT)
Dept: NEUROLOGY | Facility: CLINIC | Age: 79
End: 2024-09-03
Payer: COMMERCIAL

## 2024-09-03 DIAGNOSIS — G43.709 CHRONIC MIGRAINE WITHOUT AURA WITHOUT STATUS MIGRAINOSUS, NOT INTRACTABLE: Primary | ICD-10-CM

## 2024-09-03 PROCEDURE — 64615 CHEMODENERV MUSC MIGRAINE: CPT | Performed by: PSYCHIATRY & NEUROLOGY

## 2024-09-03 NOTE — PROGRESS NOTES
Botulinum Toxin Procedure Note     Mackenzie Brown  [id number]     Ordering provider: La Nena Rolle CNP     The procedure was explained to the patient. Benefits of the treatment were discussed including headache and migraine reduction. Risks of the procedure were reviewed including but not limited to pain, bruising, bleeding, infection, weakness of muscles injected or those distal to injection. The patient voiced understanding of the risks and benefits. All questions answered and the patient consented to proceed.      Prior to receiving Botox injections the patient reported the following:  Baseline headache/migraine frequency: daily x 2 years  Baseline headache/migraine duration: constant  Associated migrainous features: nausea, photophobia     Previous treatment trials:  Topiramate ineffective  On multiple antidepressants can not add TCAs safelt given age and concommittant use of bupropion, paroxetine and trazodone  Nurtec prn     Last Botox procedure: 4/23/24  Current migraine frequency: no exact numbers she reports she just feels better but still has headaches. Has only had one round of injections.   Current migraine duration: not clear     Functional improvements since starting botulinum toxin treatments: Only has one round, too soon to tell.      Last Neurology office visit: 3/28/24     A 200 unit vial of Botox was diluted with 4ml of 0.9% sodium chloride     Botox lot # and expiration date: See MAR for details  0.9% sodium chloride lot # and expiration date: See MAR for details     The following muscles were injected using a 30 gauge needle:  Procerus 1 site 5 units   2 sites (bilat) 10 units  Frontalis 4 sites (bilat) 20 units  Temporalis 8 sites (bilat) 40 units  Trapezius muscles 6 sites (bilat) 30 units  Cervical paraspinals (bilat) 4 sites 20 units  Occipitalis 6 sites (bilat) 30 units     A total of 155 units were injected, 45 wasted.   The patient tolerated the injections well. I was present  for and performed the entire procedure    Clarissa Menard MD

## 2024-09-03 NOTE — LETTER
9/3/2024       RE: Mackenzie Brown  [address]  [place] [address]     Dear Colleague,    Thank you for referring your patient, Mackenzie Brown, to the [facility] [place] at [facility]. Please see a copy of my visit note below.    Botulinum Toxin Procedure Note     Mackenzie Brown  [id number]     Ordering provider: La Nena Rolle CNP     The procedure was explained to the patient. Benefits of the treatment were discussed including headache and migraine reduction. Risks of the procedure were reviewed including but not limited to pain, bruising, bleeding, infection, weakness of muscles injected or those distal to injection. The patient voiced understanding of the risks and benefits. All questions answered and the patient consented to proceed.      Prior to receiving Botox injections the patient reported the following:  Baseline headache/migraine frequency: daily x 2 years  Baseline headache/migraine duration: constant  Associated migrainous features: nausea, photophobia     Previous treatment trials:  Topiramate ineffective  On multiple antidepressants can not add TCAs safelt given age and concommittant use of bupropion, paroxetine and trazodone  Nurtec prn     Last Botox procedure: 4/23/24  Current migraine frequency: no exact numbers she reports she just feels better but still has headaches. Has only had one round of injections.   Current migraine duration: not clear     Functional improvements since starting botulinum toxin treatments: Only has one round, too soon to tell.      Last Neurology office visit: 3/28/24     A 200 unit vial of Botox was diluted with 4ml of 0.9% sodium chloride     Botox lot # and expiration date: See MAR for details  0.9% sodium chloride lot # and expiration date: See MAR for details     The following muscles were injected using a 30 gauge needle:  Procerus 1 site 5 units   2 sites (bilat)  10 units  Frontalis 4 sites (bilat) 20 units  Temporalis 8 sites (bilat) 40 units  Trapezius muscles 6 sites (bilat) 30 units  Cervical paraspinals (bilat) 4 sites 20 units  Occipitalis 6 sites (bilat) 30 units     A total of 155 units were injected, 45 wasted.   The patient tolerated the injections well. I was present for and performed the entire procedure    Clarissa Menard MD      Again, thank you for allowing me to participate in the care of your patient.      Sincerely,    Clarissa Menard MD

## 2024-09-16 ENCOUNTER — OFFICE VISIT (OUTPATIENT)
Dept: PHYSICAL MEDICINE AND REHAB | Facility: CLINIC | Age: 79
End: 2024-09-16
Payer: COMMERCIAL

## 2024-09-16 VITALS
HEART RATE: 86 BPM | OXYGEN SATURATION: 94 % | SYSTOLIC BLOOD PRESSURE: 91 MMHG | DIASTOLIC BLOOD PRESSURE: 67 MMHG | RESPIRATION RATE: 16 BRPM

## 2024-09-16 DIAGNOSIS — M47.892 OTHER SPONDYLOSIS, CERVICAL REGION: Primary | ICD-10-CM

## 2024-09-16 PROCEDURE — 99214 OFFICE O/P EST MOD 30 MIN: CPT | Performed by: PHYSICAL MEDICINE & REHABILITATION

## 2024-09-16 NOTE — LETTER
9/16/2024       RE: Mackenzie Brown  [address]  [facility] [address]     Dear Colleague,    Thank you for referring your patient, Mackenzie Brown, to the [facility] [place] at [facility]. Please see a copy of my visit note below.    PM&R Follow-Up Visit -     Date of Initial Visit: 06/17/24  LOV: 07/11/24 (Procedure visit)  TD: 9/16/2024     Recall: Joana Brown is a 78 year old female with history of chronic migraine, paroxysmal A-fib, CHG, tachy/bradycardia syndrome with dual-chamber PPM who follows up for neck and shoulder pain predominantly on the left    INTERVAL HISTORY:  Patient was last seen for ultrasound-guided left suprascapular nerve block which was performed July 11, 2024.  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  Following the injection, she notes 75 to 80% ongoing improvement.  She is quite pleased with the results and has been able to be much more pain-free with improved function and range of motion.  She has much less scapular pain on the left as well.  Her symptoms do increase when she lays on her left arm, for example, or with overuse/exertion.    Today, her primary complaint is with regards to her neck pain which was the original reason for referral.  She was referred to [facility] for physical therapy per her preference which has provided relief, albeit somewhat minimal.  She continues to have mid cervical pain that is worse on the right compared to the left that is functionally limiting and reduce his quality of life.  Symptoms are relatively constant but worse when reading or prolonged positioning.    She continues medication and Botox treatment with the neurology headache clinic.  She wonders if her neck symptoms are  contributing to her headache which had significantly worsened following her fall and C5 fracture.    RECALL HPI: Patient seen at the request of AZEB Clancy CNP for an opinion and evaluation of neck pain    HISTORY OF PRESENT ILLNESS:  Mackenzie Brown is a 78 year old female who presents with a chief complaint of headaches, neck pain and left shoulder pain.     Pain began  Fall 2022 and is associated with trauma. Tripped on a , falling forward on her body, head, and arm. She developed neck pain after the fall and sustained left shoulder injury.  She was seen at an outside emergency department and per care everywhere she had sustained minimally displaced fracture of the anterior/inferior corner of the C5 vertebral body, as well as, other close displaced fracture of the proximal end of the left humerus which was managed nonoperatively with sling and initial nonweightbearing.  She has followed with orthopedics for her left shoulder issues through [facility].    Currently, she localizes pain and symptoms to the cervical spine relatively midline with radiating symptoms to the scapula and occasionally left shoulder and to the level of the arm.  She attributes some of her radiating symptoms to the shoulder/humerus fracture that she had sustained previously.  Pain score 5/10 today at rest and 8/10 at its worst last week.  Pain is described as constant dull and aching in nature. Symptoms are worse when reading for prolong positioning ; and improved with tylenol. She does denies report pain-related sleep disturbance.     PRIOR INJURIES/TREATMENT:   Ice/Heat: Both help temporary   Brace: cervical collar after the fracture  Physical Therapy:   Current PT x5 sessions through [facility]     - Current Pain Medications -   Tylenol prn   Tramadol 50mg prn - very rare use. Has not used for a long time.     - Prior/Trialed Pain Medications -   NSAIDs - avoids due to cardiac issues    Prior Procedures:  Botox for migraine HA  every 12 weeks.     04/16/24 - L GHJ inj w/ US ([facility]) - No sig benefit  07/11/24 - L SSN block w/ US - 75-80% ongoing improvement     Prior Related Surgery: None   Other (acupuncture, OMT, CMM, TENS, DME, etc.):   None    Specialists Seen - (with most recent, available notes and clinic visits reviewed)   1. Neurology - Dr. Menard, La Nena Rolle, APRN CNP      IMAGING - reviewed   06/17/2024 XR Cervical spin with flexion/extension   Impression:  1. Trace retrolisthesis of C3 on C4 in neutral, more pronounced in  extension and corrected with flexion.  2. Extensive multilevel cervical spondylosis, most pronounced from C4 to C7.    10/17/22 MRI C spine ([facility])  Impression:   1. C5 anterior inferior endplate fracture, with associated likely disruption/injury to the anterior longitudinal ligament. The posterior longitudinal, ligamentum flavum and interspinous ligaments do not demonstrate edema signal and are favored to be intact.   2. Additional possible stress reaction or injury versus degenerative change to the anterior inferior C6 vertebral body.   3. At C4-5, mild spinal canal with moderately severe right and mild left neural foraminal narrowing. Potential impingement of the right C5 nerve.   4. At C5-6, mild spinal canal with moderately severe right and moderate left neural foraminal narrowing. Potential impingement of the right C6 nerve.   5. At C6-7, mild spinal canal and moderate bilateral neural foraminal narrowing.      Medical History:  Problem List  Patient Active Problem List   Diagnosis Code   Migraine, unspecified, without mention of intractable migraine without mention of status migrainosus G43.909   Chronic back pain M54.9, G89.29   Skin cancer C44.90   Diverticulitis of colon (without mention of hemorrhage) K57.32   Personal history of colonic polyps Z86.010   Generalized anxiety disorder F41.1   A-fib (HC) I48.91   AK (actinic keratosis) L57.0   Senile osteoporosis M81.0   ACP (advance care planning)  Z71.89   Mild cognitive impairment G31.84   Multiple thyroid nodules E04.2   Hypertension I10   Knee pain, left M25.562   Cataracts, both eyes H26.9   Adrenal nodule (HC) E27.8   Primary osteoarthritis involving multiple joints M15.9   Spondylosis of lumbar region without myelopathy or radiculopathy M47.816   PAF (paroxysmal atrial fibrillation) (HC) I48.0   Depression, recurrent (HC) F33.9   Diverticular disease of large intestine K57.30   Irritable bowel syndrome with diarrhea K58.0   Lactose intolerance E73.9   CHF (congestive heart failure), NYHA class I, acute, diastolic (HC) I50.31   Concussion S06.0XAA   Postoperative hypothyroidism E89.0   Toxic multinodular goiter E05.20   EVERT on CPAP G47.33   Hypoxemia R09.02   Insomnia G47.00   Chest pain R07.9   Acute heart failure with preserved ejection fraction (HC) I50.31   Acute on chronic heart failure with preserved ejection fraction (HC) I50.33   Tachy-braydon syndrome (HC) I49.5   B12 deficiency E53.8   Chest pain R07.9   GERD (gastroesophageal reflux disease) K21.9   Presence of Watchman left atrial appendage closure device Z95.818   Senile dementia, uncomplicated (HC) F03.90   Chronic heart failure with preserved ejection fraction (HC) I50.32   Closed nondisplaced fracture of fifth cervical vertebra (HC) S12.401A   Acute on chronic respiratory failure with hypoxemia (HC) J96.21   Closed fracture of proximal end of humerus S42.209A   Closed fracture of proximal end of left humerus with routine healing S42.202D   HLD (hyperlipidemia) E78.5   Esophoria H50.51     Mackenzie has a past surgical history that includes appendectomy; hysterectomy; breasts implants (1999); tonsillectomy; colectomy (2010); mr head brain wwo (11/10/2011); Right Foot Surgery (2/21/13); Patella fracture surgery (Left, 4/2015); ORIF tibial shaft fracture w/ plates and screws (Left, 4/2015); Wrist fracture surgery (Left, ~2009); Total knee arthroplasty (Left, 10/27/2015); Total thyroidectomy  (02/23/2021); ablation; pacemaker generator; and (ia) [facility] watchman lt atrial appendage insert (03/30/2022).           Social History:  Work: Retired. Owned a business   Current living situation:    She  reports that she has never smoked. She has never used smokeless tobacco. She reports current alcohol use. She reports that she does not use drugs.        Current Medications:   She has a current medication list which includes the following prescription(s): acetaminophen, amlodipine, aspirin-acetaminophen-caffeine, bupropion, citracal slow release, cholecalciferol, cyanocobalamin, donepezil, furosemide, irbesartan, levothyroxine, loperamide, nitroglycerin, pantoprazole sodium, paroxetine, rimegepant, sennosides, tramadol, and trazodone, and the following Facility-Administered Medications: botulinum toxin type a and botulinum toxin type a.     Allergies:    -- Demerol [Meperidine] -- Unknown   -- Levofloxacin -- Unknown   -- Metronidazole -- Unknown    PHYSICAL EXAMINATION:  BP 91/67 (BP Location: Right arm, Patient Position: Sitting, Cuff Size: Adult Regular)   Pulse 86   Resp 16   SpO2 94%    General: Pleasant, straightforward, WDWN individual.  Mental Status: Pleasant, direct, appropriate mood and affect  Resp: breathing is unlabored without audible wheeze  Vascular: No cyanosis   Heme: No visible ecchymosis or erythema on extremities  Skin: No notable rash    Neurologic:  Strength: All major muscle groups of the bilateral upper extremities have normal and symmetric muscle strength       Musculoskeletal:  Cervical Spine: ROM mild-mod reduced in all planes, Posture kyphotic w/ forward neck protrusion, Tender over bilateral cervical paraspinals, trapezius, and levator scapulae muscles. Pain with extension and lateral rotation    Left Shoulder Exam: ROM full         ASSESSMENT:  Mackenzie Brown is a pleasant 78 year old female who presents with:    #. Cervicalgia  #. Other spondylosis, cervical region   #.  Traumatic minimally displaced fracture of the anterior/inferior corner of the C5 vertebral body (2022)  #.  Chronic left shoulder pain in the setting of underlying GHJ OA and closed, displaced fracture of the proximal end of the left humerus (2022) managed nonoperatively.  75 to 80% improvement following ultrasound-guided left suprascapular nerve block (07/11/2024)    Complicating co-morbidities include:   #. Chronic migraine headaches. Follows with neurology headache clinic  #. CHF, paroxysmal A-fib + dual chamber    #. IBS  #. Depression   #. Polyarthralgia     PLAN:  -Continue Spine PT at [facility]  -X-ray reviewed with patient today  -Refer for RIGHT C3-4, C4-5 medial branch block #1  The pathway from diagnostic medial branch blocks to radiofrequency denervation was discussed in detail with the patient today.  Risks and benefits were discussed and all questions were answered.  The patient states understanding and wishes to proceed.  There are no contraindications.  The patient understands they will have 2 diagnostic medial branch blocks; and after each block they will be required to keep a pain diary recording their pain scores approximately every hour until the pain has returned to baseline.  During the time after the block, the patient was instructed to perform activities which typically aggravates their pain.  The patient will contact the medical spine staff after each diagnostic block to assess the amount of benefit the patient received.  If the patient has a significantly positive response to each of these diagnostic blocks, they may move forward with the radiofrequency ablation procedure.   -Consider repeat suprascapular nerve blocks in the future.  -Follow up for procedure    Ready to learn, no apparent learning barriers.  Education provided on treatment plan according to patient's preferred learning style.  Patient verbalizes understanding.   __________________________________  Emily Patel  MD  Physical Medicine & Rehabilitation       I spent a total of 35 minutes on the day of the visit.   Time spent by me doing chart review, history and exam, documentation and further activities per the note       Again, thank you for allowing me to participate in the care of your patient.      Sincerely,    Emily Patel MD

## 2024-09-16 NOTE — NURSING NOTE
Chief Complaint   Patient presents with    RECHECK     Here for recheck, confirmed with patient     Matrha Rolon

## 2024-09-16 NOTE — PROGRESS NOTES
PM&R Follow-Up Visit -     Date of Initial Visit: 06/17/24  LOV: 07/11/24 (Procedure visit)  TD: 9/16/2024     Recall: Joana Brown is a 78 year old female with history of chronic migraine, paroxysmal A-fib, CHG, tachy/bradycardia syndrome with dual-chamber PPM who follows up for neck and shoulder pain predominantly on the left    INTERVAL HISTORY:  Patient was last seen for ultrasound-guided left suprascapular nerve block which was performed July 11, 2024.  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  Following the injection, she notes 75 to 80% ongoing improvement.  She is quite pleased with the results and has been able to be much more pain-free with improved function and range of motion.  She has much less scapular pain on the left as well.  Her symptoms do increase when she lays on her left arm, for example, or with overuse/exertion.    Today, her primary complaint is with regards to her neck pain which was the original reason for referral.  She was referred to [facility] for physical therapy per her preference which has provided relief, albeit somewhat minimal.  She continues to have mid cervical pain that is worse on the right compared to the left that is functionally limiting and reduce his quality of life.  Symptoms are relatively constant but worse when reading or prolonged positioning.    She continues medication and Botox treatment with the neurology headache clinic.  She wonders if her neck symptoms are contributing to her headache which had significantly worsened following her fall and C5 fracture.    RECALL HPI: Patient seen at the request of AZEB Clancy CNP for an opinion and evaluation of neck pain    HISTORY OF PRESENT ILLNESS:  Mackenzie Brown is a 78 year old female who presents with a chief complaint of headaches, neck pain and left  shoulder pain.     Pain began  Fall 2022 and is associated with trauma. Tripped on a , falling forward on her body, head, and arm. She developed neck pain after the fall and sustained left shoulder injury.  She was seen at an outside emergency department and per care everywhere she had sustained minimally displaced fracture of the anterior/inferior corner of the C5 vertebral body, as well as, other close displaced fracture of the proximal end of the left humerus which was managed nonoperatively with sling and initial nonweightbearing.  She has followed with orthopedics for her left shoulder issues through [facility].    Currently, she localizes pain and symptoms to the cervical spine relatively midline with radiating symptoms to the scapula and occasionally left shoulder and to the level of the arm.  She attributes some of her radiating symptoms to the shoulder/humerus fracture that she had sustained previously.  Pain score 5/10 today at rest and 8/10 at its worst last week.  Pain is described as constant dull and aching in nature. Symptoms are worse when reading for prolong positioning ; and improved with tylenol. She does denies report pain-related sleep disturbance.     PRIOR INJURIES/TREATMENT:   Ice/Heat: Both help temporary   Brace: cervical collar after the fracture  Physical Therapy:   Current PT x5 sessions through [facility]     - Current Pain Medications -   Tylenol prn   Tramadol 50mg prn - very rare use. Has not used for a long time.     - Prior/Trialed Pain Medications -   NSAIDs - avoids due to cardiac issues    Prior Procedures:  Botox for migraine HA every 12 weeks.     04/16/24 - L GHJ inj w/ US ([facility]) - No sig benefit  07/11/24 - L SSN block w/ US - 75-80% ongoing improvement     Prior Related Surgery: None   Other (acupuncture, OMT, CMM, TENS, DME, etc.):   None    Specialists Seen - (with most recent, available notes and clinic visits reviewed)   1. Neurology - Dr. Menard, La Nena Rolle,  APRN CNP      IMAGING - reviewed   06/17/2024 XR Cervical spin with flexion/extension   Impression:  1. Trace retrolisthesis of C3 on C4 in neutral, more pronounced in  extension and corrected with flexion.  2. Extensive multilevel cervical spondylosis, most pronounced from C4 to C7.    10/17/22 MRI C spine ([facility])  Impression:   1. C5 anterior inferior endplate fracture, with associated likely disruption/injury to the anterior longitudinal ligament. The posterior longitudinal, ligamentum flavum and interspinous ligaments do not demonstrate edema signal and are favored to be intact.   2. Additional possible stress reaction or injury versus degenerative change to the anterior inferior C6 vertebral body.   3. At C4-5, mild spinal canal with moderately severe right and mild left neural foraminal narrowing. Potential impingement of the right C5 nerve.   4. At C5-6, mild spinal canal with moderately severe right and moderate left neural foraminal narrowing. Potential impingement of the right C6 nerve.   5. At C6-7, mild spinal canal and moderate bilateral neural foraminal narrowing.      Medical History:  Problem List  Patient Active Problem List   Diagnosis Code   Migraine, unspecified, without mention of intractable migraine without mention of status migrainosus G43.909   Chronic back pain M54.9, G89.29   Skin cancer C44.90   Diverticulitis of colon (without mention of hemorrhage) K57.32   Personal history of colonic polyps Z86.010   Generalized anxiety disorder F41.1   A-fib () I48.91   AK (actinic keratosis) L57.0   Senile osteoporosis M81.0   ACP (advance care planning) Z71.89   Mild cognitive impairment G31.84   Multiple thyroid nodules E04.2   Hypertension I10   Knee pain, left M25.562   Cataracts, both eyes H26.9   Adrenal nodule (HC) E27.8   Primary osteoarthritis involving multiple joints M15.9   Spondylosis of lumbar region without myelopathy or radiculopathy M47.816   PAF (paroxysmal atrial fibrillation) (HC)  I48.0   Depression, recurrent (HC) F33.9   Diverticular disease of large intestine K57.30   Irritable bowel syndrome with diarrhea K58.0   Lactose intolerance E73.9   CHF (congestive heart failure), NYHA class I, acute, diastolic (HC) I50.31   Concussion S06.0XAA   Postoperative hypothyroidism E89.0   Toxic multinodular goiter E05.20   EVERT on CPAP G47.33   Hypoxemia R09.02   Insomnia G47.00   Chest pain R07.9   Acute heart failure with preserved ejection fraction (HC) I50.31   Acute on chronic heart failure with preserved ejection fraction (HC) I50.33   Tachy-braydon syndrome (HC) I49.5   B12 deficiency E53.8   Chest pain R07.9   GERD (gastroesophageal reflux disease) K21.9   Presence of Watchman left atrial appendage closure device Z95.818   Senile dementia, uncomplicated (HC) F03.90   Chronic heart failure with preserved ejection fraction (HC) I50.32   Closed nondisplaced fracture of fifth cervical vertebra (HC) S12.401A   Acute on chronic respiratory failure with hypoxemia (HC) J96.21   Closed fracture of proximal end of humerus S42.209A   Closed fracture of proximal end of left humerus with routine healing S42.202D   HLD (hyperlipidemia) E78.5   Esophoria H50.51     Mackenzie has a past surgical history that includes appendectomy; hysterectomy; breasts implants (1999); tonsillectomy; colectomy (2010); mr head brain wwo (11/10/2011); Right Foot Surgery (2/21/13); Patella fracture surgery (Left, 4/2015); ORIF tibial shaft fracture w/ plates and screws (Left, 4/2015); Wrist fracture surgery (Left, ~2009); Total knee arthroplasty (Left, 10/27/2015); Total thyroidectomy (02/23/2021); ablation; pacemaker generator; and (ia) [facility] watchman lt atrial appendage insert (03/30/2022).           Social History:  Work: Retired. Owned a business   Current living situation:    She  reports that she has never smoked. She has never used smokeless tobacco. She reports current alcohol use. She reports that she does not use  drugs.        Current Medications:   She has a current medication list which includes the following prescription(s): acetaminophen, amlodipine, aspirin-acetaminophen-caffeine, bupropion, citracal slow release, cholecalciferol, cyanocobalamin, donepezil, furosemide, irbesartan, levothyroxine, loperamide, nitroglycerin, pantoprazole sodium, paroxetine, rimegepant, sennosides, tramadol, and trazodone, and the following Facility-Administered Medications: botulinum toxin type a and botulinum toxin type a.     Allergies:    -- Demerol [Meperidine] -- Unknown   -- Levofloxacin -- Unknown   -- Metronidazole -- Unknown    PHYSICAL EXAMINATION:  BP 91/67 (BP Location: Right arm, Patient Position: Sitting, Cuff Size: Adult Regular)   Pulse 86   Resp 16   SpO2 94%    General: Pleasant, straightforward, WDWN individual.  Mental Status: Pleasant, direct, appropriate mood and affect  Resp: breathing is unlabored without audible wheeze  Vascular: No cyanosis   Heme: No visible ecchymosis or erythema on extremities  Skin: No notable rash    Neurologic:  Strength: All major muscle groups of the bilateral upper extremities have normal and symmetric muscle strength       Musculoskeletal:  Cervical Spine: ROM mild-mod reduced in all planes, Posture kyphotic w/ forward neck protrusion, Tender over bilateral cervical paraspinals, trapezius, and levator scapulae muscles. Pain with extension and lateral rotation    Left Shoulder Exam: ROM full         ASSESSMENT:  Mackenzie Brown is a pleasant 78 year old female who presents with:    #. Cervicalgia  #. Other spondylosis, cervical region   #. Traumatic minimally displaced fracture of the anterior/inferior corner of the C5 vertebral body (2022)  #.  Chronic left shoulder pain in the setting of underlying GHJ OA and closed, displaced fracture of the proximal end of the left humerus (2022) managed nonoperatively.  75 to 80% improvement following ultrasound-guided left suprascapular nerve  block (07/11/2024)    Complicating co-morbidities include:   #. Chronic migraine headaches. Follows with neurology headache clinic  #. CHF, paroxysmal A-fib + dual chamber    #. IBS  #. Depression   #. Polyarthralgia     PLAN:  -Continue Spine PT at [facility]  -X-ray reviewed with patient today  -Refer for RIGHT C3-4, C4-5 medial branch block #1  The pathway from diagnostic medial branch blocks to radiofrequency denervation was discussed in detail with the patient today.  Risks and benefits were discussed and all questions were answered.  The patient states understanding and wishes to proceed.  There are no contraindications.  The patient understands they will have 2 diagnostic medial branch blocks; and after each block they will be required to keep a pain diary recording their pain scores approximately every hour until the pain has returned to baseline.  During the time after the block, the patient was instructed to perform activities which typically aggravates their pain.  The patient will contact the medical spine staff after each diagnostic block to assess the amount of benefit the patient received.  If the patient has a significantly positive response to each of these diagnostic blocks, they may move forward with the radiofrequency ablation procedure.   -Consider repeat suprascapular nerve blocks in the future.  -Follow up for procedure    Ready to learn, no apparent learning barriers.  Education provided on treatment plan according to patient's preferred learning style.  Patient verbalizes understanding.   __________________________________  Emily Patel MD  Physical Medicine & Rehabilitation       I spent a total of 35 minutes on the day of the visit.   Time spent by me doing chart review, history and exam, documentation and further activities per the note

## 2024-09-16 NOTE — PATIENT INSTRUCTIONS
It was a pleasure seeing you today in our [facility]. We discussed the following:    #. We will plan for a Right C3-4,C4-5 medial branch block #1 to help understand if the facet joints in the spine are contributing to your pain. Our procedure schedulers will reach out to you for an appointment.     There is additional information regarding this procedure outlined below.        Medial Branch Blocks and Radiofrequency Ablation (RFA) aka Neurotomy  Back or neck pain may be due to problems with certain nerves near your spine. If so, a medial branch neurotomy can help relieve your pain. Neurotomy destroys a nerve to relieve pain or stop involuntary movements. In some cases, your doctor may give you a nerve block to see how your body will respond to neurotomy. The treatment uses heat, cold, radiofrequency, or chemicals to destroy the nerves near a problem joint. This keeps some pain messages from traveling to your brain, and helps relieve your symptoms.   Medial branch nerves  Each vertebra in your spine has facets (flat surfaces). They touch where the vertebrae fit together. This forms a facet joint. Each facet joint has at least 2 medial branch nerves. They are part of the nerve pathway to and from each facet joint. A facet joint in your back or neck can become inflamed (swollen and irritated). Pain messages may then travel along the nerve pathway from the facet joint to your brain.     Blocking pain messages  Medial branch nerves in each facet joint send and carry messages about back or neck pain. Destroying a few of these nerves can keep certain pain messages from reaching your brain. This can help bring you relief. The relief typically lasts for months to years.   Risks and complications  Risks and complications are rare, but can include:  Infection  Increased pain, numbness, or weakness  Nerve damage  Bleeding  Failure to relieve pain  Stan last reviewed this educational content on 4/1/2018     1073-5146 [employer]. All rights reserved. This information is not intended as a substitute for professional medical care. Always follow your healthcare professional's instructions.          Preparing for Spine Injection Therapy                 Why Do I Need Spine Injection Therapy?  Your Health Care Provider is recommending spine injection therapy to  help relieve your back and neck pain. This will be in addition to other therapies such as medications and physical therapy. The purpose of these injections is to reduce the amount of inflammation (swelling, pain, heat, redness, loss of body function) around the nerves thus reducing the amount of pain.      The medications you will receive with the injections will include:   Anesthetic - medication to numb the painful area.      To reduce your discomfort during the injection procedure, you will receive a numbing medication injection prior to the placement of the needles. You will be lying on your stomach during the injection procedure. We will use a low-dose x-ray (fluoroscopy) to help guide needle placement.  You must have a  for this procedure. We will need to reschedule your injection if you do not have a  with you.       What are the different types of injections and procedure?  Below, is a brief description of the different types of injections we use to deliver pain medication as close as possible to the nerves in the painful area:     Medial Branch Block injections: This is a test to see if your pain is      coming from a specific nerve. This injection is similar to a facet joint injection but contains only the numbing medication.  You will keep a pain score diary for the rest of the day and the following morning after receiving the injection.    Radiofrequency ablation: This is a procedure that uses radio waves and numbing medication to block the nerves that feel pain at the joint. The pain relief effects can last for a long time, but  are not permanent. The procedure is similar to the Medial Branch Block but requires additional testing to ensure that the needle is near the nerve before numbing and ablating it.  Doctors often order sedation for this procedure.  Please see the sections on sedation below.         Follow up: for procedure.

## 2024-09-18 ENCOUNTER — TELEPHONE (OUTPATIENT)
Dept: PHYSICAL MEDICINE AND REHAB | Facility: CLINIC | Age: 79
End: 2024-09-18
Payer: COMMERCIAL

## 2024-09-18 DIAGNOSIS — M47.892 OTHER SPONDYLOSIS, CERVICAL REGION: Primary | ICD-10-CM

## 2024-09-18 NOTE — TELEPHONE ENCOUNTER
Screening questions for MBB Injections:    Injection to be done at which interventional clinic site? [facility]    Procedure ordered by Dr. Patel    Procedure ordered? Cervical Medial Branch Block    What insurance would patient like us to bill for this procedure? [employer]    [employer]: REQUIRES A PA FOR BOTH MBB   Worker's comp- Any injection DO NOT SCHEDULE and route to Meng Oakes.    [employer] insurance - ANY INJECTION, DO NOT SCHEDULE and route to Kristal Booth.     MBBs must be scheduled with elapsed time interval of at least 2 weeks and not more than 6 months between the First MBB and the Second MBB for insurance purposes     [employer] - Any injection besides hip/shoulder/knee joint DO NOT SCHEDULE and route to Kristal Booth. She will obtain PA and call pt back to schedule procedure or notify pt of denial.     [employer]- PA required for all MBB's    **BCBS- ALL need to be routed to Kristal for review if a PA is needed**  IF SCHEDULING IN [place] PAIN OR SPINE PLEASE SCHEDULE AT LEAST 7-10         BUSINESS DAYS OUT SO A PA CAN BE OBTAINED    Genicular Nerve blocks- ALL insurances except for- [employer], Medicare (straight not supplement) and [employer]. Need to be reviewed by Kristal before scheduling.       Is patient scheduled at [facility]? yes   If YES, route every encounter to [facility] CARE NAVIGATION POOL [[phone]]    Any chance of pregnancy? NO   If YES, do NOT schedule and route to RN pool  - Dr. Patel route to Radha Schneider and PM&R Nurse  [[id number]]      Is an  needed? No     Patient has a drive home? (mandatory) Yes     Is patient taking any blood thinners (plavix, coumadin, jantoven, warfarin, heparin, pradaxa or dabigatran )? No    If hold needed, do NOT schedule, route to RN pool/ Dr. Patel's Team     Does the patient have a bleeding or clotting disorder? No  If YES, okay to schedule AND route to RN nurse blanca/ Dr. Patel's Team  **For any patients with  platelet count <100, must be forwarded to provider**    Is patient diabetic? no If YES, have them bring their glucometer.    Does patient have an active infection or treated for one within the past week? No    Is patient currently taking any antibiotics?  No  For patients on chronic, preventative, or prophylactic antibiotics, procedures may be scheduled.   For patients on antibiotics for active or recent infection:antibiotic course must have been completed for 4 days    Is patient currently taking any steroid medications? (i.e. Prednisone, Medrol)  No   For patients on steroid medications, course must have been completed for 4 days    Is patient actively being treated for cancer or immunocompromised? No   If YES, do NOT schedule and route to RN/ Dr. Patel's Team    Are you able to get on and off an exam table with minimal or no assistance? Yes   If NO, do NOT schedule and route to RN/ Dr. Patel's Team    Are you able to roll over and lay on your stomach with minimal or no assistance? Yes   If NO, do NOT schedule and route to RN/ Dr. Patel's Team    Any allergies to contrast dye, iodine, shellfish, or numbing and steroid medications? No  (If so, inform nursing and note in scheduling comments.)    Allergies: Demerol [meperidine], Levofloxacin, and Metronidazole     Does patient have an MRI/CT?  Not Applicable  Check Procedure Scheduling Grid to see if required.    Was the MRI done within the last 3 years?  NA  If yes, where was the MRI done i.e.[facility], [facility], [facility], [facility], [facility] etc?     If no, do not schedule and route to nursing/ Dr. Patel's Team  If MRI was not done at [place], [facility] or [facility] do NOT schedule and route to nursing.    If pt has an imaging disc, the injection MAY be scheduled but pt has to bring disc to appt.   If they show up without the disc the injection cannot be done    Is patient able to transfer to a procedure table with minimal or no assistance? Yes  If NO, do NOT  schedule and route to RN/ Dr. Patel's Team    Medial Branch Block Pre-Procedure Instructions  It is okay to take long acting pain medications (if you are on them) the day of the procedure but try not to take any short acting medications unless absolutely necessary.    YES: ok   Long acting meds would include: Gabapentin (Neurontin), MS Contin, Oxycontin        Short acting meds would include:  Percocet, Oxycodone, Vicodin, Ibuprofen   The day of the procedure, you should try to do things that provoke your pain, since the injection is being done to see if it will relieve your pain . YES: ok   If your pain level is a 4 out of 10 or less on the day of the procedu re, please call [phone] to reschedule.  YES: ok     Reminders:    If you are started on any steroids or antibiotics between now and your appointment, you must contact us because it may affect our ability to perform your procedure ok    Instructed pt to arrive 30 minutes early for IV start if required. (Check Procedure Scheduling Grid) IYES: ok     If this is for a cervical MBB aspirin needs to be held for 6 days.  YES: ok     Do not schedule procedures requiring IV placement in the first appointment of the day or first appointment after lunch. Do NOT schedule at 0745, 0815 or 1245.  ok    For patients 85 or older we recommend having an adult stay w/ them for the remainder of the day.      Does the patient have any questions? no

## 2024-10-03 ENCOUNTER — RADIOLOGY INJECTION OFFICE VISIT (OUTPATIENT)
Dept: PHYSICAL MEDICINE AND REHAB | Facility: CLINIC | Age: 79
End: 2024-10-03
Attending: PHYSICAL MEDICINE & REHABILITATION
Payer: COMMERCIAL

## 2024-10-03 VITALS
TEMPERATURE: 98.1 F | DIASTOLIC BLOOD PRESSURE: 78 MMHG | HEART RATE: 87 BPM | BODY MASS INDEX: 27.83 KG/M2 | HEIGHT: 64 IN | OXYGEN SATURATION: 96 % | WEIGHT: 163 LBS | RESPIRATION RATE: 16 BRPM | SYSTOLIC BLOOD PRESSURE: 148 MMHG

## 2024-10-03 DIAGNOSIS — M47.892 OTHER SPONDYLOSIS, CERVICAL REGION: ICD-10-CM

## 2024-10-03 PROCEDURE — 64490 INJ PARAVERT F JNT C/T 1 LEV: CPT | Mod: RT | Performed by: PHYSICAL MEDICINE & REHABILITATION

## 2024-10-03 PROCEDURE — 64491 INJ PARAVERT F JNT C/T 2 LEV: CPT | Mod: RT | Performed by: PHYSICAL MEDICINE & REHABILITATION

## 2024-10-03 RX ORDER — BUPIVACAINE HYDROCHLORIDE 5 MG/ML
INJECTION, SOLUTION EPIDURAL; INTRACAUDAL
Status: COMPLETED | OUTPATIENT
Start: 2024-10-03 | End: 2024-10-03

## 2024-10-03 RX ORDER — LIDOCAINE HYDROCHLORIDE 10 MG/ML
INJECTION, SOLUTION EPIDURAL; INFILTRATION; INTRACAUDAL; PERINEURAL
Status: COMPLETED | OUTPATIENT
Start: 2024-10-03 | End: 2024-10-03

## 2024-10-03 RX ADMIN — LIDOCAINE HYDROCHLORIDE 3 ML: 10 INJECTION, SOLUTION EPIDURAL; INFILTRATION; INTRACAUDAL; PERINEURAL at 09:34

## 2024-10-03 RX ADMIN — BUPIVACAINE HYDROCHLORIDE 1.5 ML: 5 INJECTION, SOLUTION EPIDURAL; INTRACAUDAL at 09:34

## 2024-10-03 ASSESSMENT — PAIN SCALES - GENERAL
PAINLEVEL: SEVERE PAIN (6)
PAINLEVEL: NO PAIN (0)

## 2024-10-03 NOTE — PATIENT INSTRUCTIONS
Please complete your pain diary and return the diary to the Spine Center at your earliest convenience.  The Spine Center will contact you to schedule your next appointment after your pain diary is reviewed by your provider.  Thank you.    DISCHARGE INSTRUCTIONS    During office hours (8:00 a.m.- 4:00 p.m.) questions or concerns may be answered  by calling Spine Center Navigation Nurses at  [phone].  Messages received after hours will be returned the following business day.      In the case of an emergency, please dial 911 or seek assistance at the nearest Emergency Room/Urgent Care facility.     All Patients:  You may experience an increase in your symptoms for the first 2 days, once the numbing medication wears off.    You may resume your regular medication, no pain medication until you have completed your diary    You may shower. No swimming, tub bath or hot tub for 24 hours    Continue your activities that can cause you pain to test the blocks.                         You should not drive for the next 1 to 3 hours (have someone drive you)           POSSIBLE PROCEDURE SIDE EFFECTS  -Call Spine Center if concerned-    Increased Pain  Increased numbness/tingling     Nausea/Vomiting  Bruising/bleeding at site (hematoma)             Swelling at site (edema) Headache  Difficulty walking  Infection        Fever greater than 100.5      Please complete your pain diary and return the diary to the Spine Center at your earliest convenience.  The Spine Center will contact you once your pain diary has been received and reviewed by your provider.  Thank you.      Medial Branch Block (MBB) TEST    This is a TEST injection to find out which nerves are causing your pain.  These diagnostic injections will NOT provide any long-term pain relief because they are a TEST.  Steroid is NOT used for this injection.  Steroid is used to provide long-lasting pain relief.  Since there is no steroid used, there will not be any long-lasting  pain relief.    The day of your medial branch block TEST:    Do not take any pain medication prior to the injections.    Try to do activities that increase your pain.  We want you to have a high level of pain on the day of the TEST as it makes it easier to know the results.  A pain rating of 5 out of 10 or greater will be required.      Other Information:    - You may eat and drink on the day of the TEST.  - You should take your other medications (just not pain medications) as prescribed.  - You will be asked to fill out a pain diary for the 6 hours following the TEST.      After the TEST    Please do not take any pain medications for 6 hours following the TEST.  After the pain diary is filled out, you may resume taking your pain medications.  Please return the pain diary to the Spine Center as soon as you are able.  You will be contacted by a member of the clinical team with the provider recommendations for next steps after the pain diary has been reviewed.   A. You may be asked to come in for a follow-up appointment to discuss other treatment options.   B. It may be recommended that you have an injection to help treat your pain.   C. You may need to come in for a second set of medial branch blocks (TESTS).

## 2024-11-26 ENCOUNTER — OFFICE VISIT (OUTPATIENT)
Dept: NEUROLOGY | Facility: CLINIC | Age: 79
End: 2024-11-26
Payer: COMMERCIAL

## 2024-11-26 DIAGNOSIS — G43.709 CHRONIC MIGRAINE WITHOUT AURA WITHOUT STATUS MIGRAINOSUS, NOT INTRACTABLE: Primary | ICD-10-CM

## 2024-11-26 ASSESSMENT — HEADACHE IMPACT TEST (HIT 6)
WHEN YOU HAVE HEADACHES HOW OFTEN IS THE PAIN SEVERE: RARELY
HOW OFTEN DO HEADACHES LIMIT YOUR DAILY ACTIVITIES: RARELY
WHEN YOU HAVE A HEADACHE HOW OFTEN DO YOU WISH YOU COULD LIE DOWN: NEVER

## 2024-11-26 NOTE — PROGRESS NOTES
Botulinum Toxin Procedure Note     Mackenzie Brown  [id number]     Ordering provider: La Nena Rolle CNP     The procedure was explained to the patient. Benefits of the treatment were discussed including headache and migraine reduction. Risks of the procedure were reviewed including but not limited to pain, bruising, bleeding, infection, weakness of muscles injected or those distal to injection. The patient voiced understanding of the risks and benefits. All questions answered and the patient consented to proceed.      Prior to receiving Botox injections the patient reported the following:  Baseline headache/migraine frequency: daily x 2 years  Baseline headache/migraine duration: constant  Associated migrainous features: nausea, photophobia     Previous treatment trials:  Topiramate ineffective  On multiple antidepressants can not add TCAs safelt given age and concommittant use of bupropion, paroxetine and trazodone  Nurtec prn     Last Botox procedure: 9/3/24  Current migraine frequency: no exact numbers she reports she just feels better but still has headaches. Has only had one round of injections. Much more than a 50% reduction.   Current migraine duration: not clear     Functional improvements since starting botulinum toxin treatments: Not having to lay down with a headache     Last Neurology office visit: 3/28/24     A 200 unit vial of Botox was diluted with 4ml of 0.9% sodium chloride     Botox lot # and expiration date: See MAR for details  0.9% sodium chloride lot # and expiration date: See MAR for details     The following muscles were injected using a 30 gauge needle:  Procerus 1 site 5 units   2 sites (bilat) 10 units  Frontalis 4 sites (bilat) 20 units  Temporalis 8 sites (bilat) 40 units  Trapezius muscles 6 sites (bilat) 30 units  Cervical paraspinals (bilat) 4 sites 20 units  Occipitalis 6 sites (bilat) 30 units     A total of 155 units were injected, 45 wasted.   The patient tolerated the  injections well. I was present for and performed the entire procedure    Clarissa Menard MD

## 2024-11-26 NOTE — LETTER
11/26/2024       RE: Mackenzie Brown  [address]  [place] [address]     Dear Colleague,    Thank you for referring your patient, aMckenzie Brown, to the [facility] [place] at [facility]. Please see a copy of my visit note below.    Botulinum Toxin Procedure Note     Mackenzie Brown  [id number]     Ordering provider: La Nena Rolle CNP     The procedure was explained to the patient. Benefits of the treatment were discussed including headache and migraine reduction. Risks of the procedure were reviewed including but not limited to pain, bruising, bleeding, infection, weakness of muscles injected or those distal to injection. The patient voiced understanding of the risks and benefits. All questions answered and the patient consented to proceed.      Prior to receiving Botox injections the patient reported the following:  Baseline headache/migraine frequency: daily x 2 years  Baseline headache/migraine duration: constant  Associated migrainous features: nausea, photophobia     Previous treatment trials:  Topiramate ineffective  On multiple antidepressants can not add TCAs safelt given age and concommittant use of bupropion, paroxetine and trazodone  Nurtec prn     Last Botox procedure: 9/3/24  Current migraine frequency: no exact numbers she reports she just feels better but still has headaches. Has only had one round of injections. Much more than a 50% reduction.   Current migraine duration: not clear     Functional improvements since starting botulinum toxin treatments: Not having to lay down with a headache     Last Neurology office visit: 3/28/24     A 200 unit vial of Botox was diluted with 4ml of 0.9% sodium chloride     Botox lot # and expiration date: See MAR for details  0.9% sodium chloride lot # and expiration date: See MAR for details     The following muscles were injected using a 30 gauge needle:  Procerus 1 site 5  units   2 sites (bilat) 10 units  Frontalis 4 sites (bilat) 20 units  Temporalis 8 sites (bilat) 40 units  Trapezius muscles 6 sites (bilat) 30 units  Cervical paraspinals (bilat) 4 sites 20 units  Occipitalis 6 sites (bilat) 30 units     A total of 155 units were injected, 45 wasted.   The patient tolerated the injections well. I was present for and performed the entire procedure    Clarissa Menard MD      Again, thank you for allowing me to participate in the care of your patient.      Sincerely,    Clarissa Menard MD

## 2025-02-18 ENCOUNTER — OFFICE VISIT (OUTPATIENT)
Dept: NEUROLOGY | Facility: CLINIC | Age: 80
End: 2025-02-18
Payer: COMMERCIAL

## 2025-02-18 VITALS — DIASTOLIC BLOOD PRESSURE: 70 MMHG | SYSTOLIC BLOOD PRESSURE: 141 MMHG | HEART RATE: 86 BPM | OXYGEN SATURATION: 95 %

## 2025-02-18 DIAGNOSIS — G43.709 CHRONIC MIGRAINE WITHOUT AURA WITHOUT STATUS MIGRAINOSUS, NOT INTRACTABLE: Primary | ICD-10-CM

## 2025-02-18 ASSESSMENT — HEADACHE IMPACT TEST (HIT 6)
HOW OFTEN HAVE YOU FELT TOO TIRED TO WORK BECAUSE OF YOUR HEADACHES: RARELY
WHEN YOU HAVE A HEADACHE HOW OFTEN DO YOU WISH YOU COULD LIE DOWN: SOMETIMES
WHEN YOU HAVE HEADACHES HOW OFTEN IS THE PAIN SEVERE: RARELY
WHEN YOU HAVE A HEADACHE HOW OFTEN DO YOU WISH YOU COULD LIE DOWN: SOMETIMES
HOW OFTEN DO HEADACHES LIMIT YOUR DAILY ACTIVITIES: RARELY
HOW OFTEN HAVE YOU FELT TOO TIRED TO WORK BECAUSE OF YOUR HEADACHES: RARELY
WHEN YOU HAVE HEADACHES HOW OFTEN IS THE PAIN SEVERE: RARELY
HOW OFTEN DID HEADACHS LIMIT CONCENTRATION ON WORK OR DAILY ACTIVITY: RARELY
HOW OFTEN DO HEADACHES LIMIT YOUR DAILY ACTIVITIES: RARELY
HOW OFTEN HAVE YOU FELT FED UP OR IRRITATED BECAUSE OF YOUR HEADACHES: RARELY
HIT6 TOTAL SCORE: 50
HOW OFTEN DID HEADACHS LIMIT CONCENTRATION ON WORK OR DAILY ACTIVITY: RARELY
HOW OFTEN HAVE YOU FELT FED UP OR IRRITATED BECAUSE OF YOUR HEADACHES: RARELY
HIT6 TOTAL SCORE: 50

## 2025-02-18 NOTE — PROGRESS NOTES
Neurology Progress Note    [facility]    Mackenzie Brown MRN# [id number]   Age: 79 year old YOB: 1945     PCP: Quinn Lainez            Assessment and Plan:     (G43.178) Chronic migraine without aura without status migrainosus, not intractable  (primary encounter diagnosis)  Comment: Currently well controlled with 50% reduction in severity of migraines. Using Tylenol for breakthrough pain.   Plan:   Preventive : Botox 155 unit(s) q 12 weeks  Acute: Tylenol prn    Clarissa Menard MD             History of Present Illness:   CC: Casey    Joana is an 79 year old woman with chornic migraine. She is accompanied by her daughter today.Last office visit with Ms Rolle 3/28/24. In April 2024 she started receivnig Botox for migraine prevention. She reports that since starting Botox her headaches have imporved. They went from occurring daily to  one severe migraine in the three months. She is using Tylenol for milder headaches once a week.     No bad effects from Botox.    She is laying on couch less. Her duaghter hears fewer complaints.     Previous treatment trials:  Topiramate ineffective  On multiple antidepressants can not add TCAs safely given age and concommittant use of bupropion, paroxetine and trazodone  Nurtec prn did not work            Physical Exam:     BP (!) 141/70 (BP Location: Right arm, Patient Position: Sitting, Cuff Size: Adult Large)   Pulse 86   SpO2 95%   Awake, alert, NAD  CN 2-12 intact  Somewhat forgetful and repeats herself during appointment.   No aphasia or dysarthria         Pertinent History/Data for appointment:         2/18/2025     8:44 AM   HIT-6   When you have headaches, how often is the pain severe 8   How often do headaches limit your ability to do usual daily activities including household work, work, school, or social activities? 8   When you have a headache, how often do you wish you could lie down? 10   In the past 4 weeks, how often have you felt too tired  to do work or daily activities because of your headaches 8   In the past 4 weeks, how often have you felt fed up or irritated because of your headaches 8   In the past 4 weeks, how often did headaches limit your ability to concentrate on work or daily activities 8   HIT-6 Total Score 50        Patient-reported            No data to display

## 2025-02-18 NOTE — LETTER
2/18/2025       RE: Mackenzie Brown  [address]  [facility] [address]     Dear Colleague,    Thank you for referring your patient, Mackenzie Brown, to the [facility] [place] [facility]. Please see a copy of my visit note below.    Botulinum Toxin Procedure Note     Mackenzie Brown  [id number]     Ordering provider: La Nena Rolle CNP/Clarissa Menard MD     The procedure was explained to the patient. Benefits of the treatment were discussed including headache and migraine reduction. Risks of the procedure were reviewed including but not limited to pain, bruising, bleeding, infection, weakness of muscles injected or those distal to injection. The patient voiced understanding of the risks and benefits. All questions answered and the patient consented to proceed.      Prior to receiving Botox injections the patient reported the following:  Baseline headache/migraine frequency: daily x 2 years  Baseline headache/migraine duration: constant  Associated migrainous features: nausea, photophobia     Previous treatment trials:  Topiramate ineffective  On multiple antidepressants can not add TCAs safelt given age and concommittant use of bupropion, paroxetine and trazodone  Nurtec prn     Last Botox procedure: 11/26/24  Current migraine frequency:   Current migraine duration:      Functional improvements since starting botulinum toxin treatments: Not having to lay down with a headache     Last Neurology office visit: 2/18/25     A 200 unit vial of Botox was diluted with 4ml of 0.9% sodium chloride     Botox lot # and expiration date: See MAR for details  0.9% sodium chloride lot # and expiration date: See MAR for details     The following muscles were injected using a 30 gauge needle:  Procerus 1 site 5 units   2 sites (bilat) 10 units  Frontalis 4 sites (bilat) 20 units  Temporalis 8 sites (bilat) 40 units  Trapezius muscles 6  sites (bilat) 30 units  Cervical paraspinals (bilat) 4 sites 20 units  Occipitalis 6 sites (bilat) 30 units     A total of 155 units were injected, 45 wasted.   The patient tolerated the injections well. I was present for and performed the entire procedure    Clarissa Menard MD    Neurology Progress Note    [facility]    Macknezie Brown MRN# [id number]   Age: 79 year old YOB: 1945     PCP: Quinn Lainez            Assessment and Plan:     (G43.709) Chronic migraine without aura without status migrainosus, not intractable  (primary encounter diagnosis)  Comment: Currently well controlled with 50% reduction in severity of migraines. Using Tylenol for breakthrough pain.   Plan:   Preventive : Botox 155 unit(s) q 12 weeks  Acute: Tylenol prn    Clarissa Menard MD             History of Present Illness:   CC: Casey    Joana is an 79 year old woman with chornic migraine. She is accompanied by her daughter today.Last office visit with Ms Rolle 3/28/24. In April 2024 she started receivnig Botox for migraine prevention. She reports that since starting Botox her headaches have imporved. They went from occurring daily to  one severe migraine in the three months. She is using Tylenol for milder headaches once a week.     No bad effects from Botox.    She is laying on couch less. Her duaghter hears fewer complaints.     Previous treatment trials:  Topiramate ineffective  On multiple antidepressants can not add TCAs safely given age and concommittant use of bupropion, paroxetine and trazodone  Nurtec prn did not work            Physical Exam:     BP (!) 141/70 (BP Location: Right arm, Patient Position: Sitting, Cuff Size: Adult Large)   Pulse 86   SpO2 95%   Awake, alert, NAD  CN 2-12 intact  Somewhat forgetful and repeats herself during appointment.   No aphasia or dysarthria         Pertinent History/Data for appointment:         2/18/2025     8:44 AM   HIT-6   When you have headaches, how often is the  pain severe 8   How often do headaches limit your ability to do usual daily activities including household work, work, school, or social activities? 8   When you have a headache, how often do you wish you could lie down? 10   In the past 4 weeks, how often have you felt too tired to do work or daily activities because of your headaches 8   In the past 4 weeks, how often have you felt fed up or irritated because of your headaches 8   In the past 4 weeks, how often did headaches limit your ability to concentrate on work or daily activities 8   HIT-6 Total Score 50        Patient-reported            No data to display                Again, thank you for allowing me to participate in the care of your patient.      Sincerely,    Clarissa Menard MD

## 2025-02-18 NOTE — PROGRESS NOTES
Botulinum Toxin Procedure Note     Mackenzie Brown  [id number]     Ordering provider: La Nena Rolle CNP/Clarissa Menard MD     The procedure was explained to the patient. Benefits of the treatment were discussed including headache and migraine reduction. Risks of the procedure were reviewed including but not limited to pain, bruising, bleeding, infection, weakness of muscles injected or those distal to injection. The patient voiced understanding of the risks and benefits. All questions answered and the patient consented to proceed.      Prior to receiving Botox injections the patient reported the following:  Baseline headache/migraine frequency: daily x 2 years  Baseline headache/migraine duration: constant  Associated migrainous features: nausea, photophobia     Previous treatment trials:  Topiramate ineffective  On multiple antidepressants can not add TCAs safelt given age and concommittant use of bupropion, paroxetine and trazodone  Nurtec prn     Last Botox procedure: 11/26/24  Current migraine frequency:   Current migraine duration:      Functional improvements since starting botulinum toxin treatments: Not having to lay down with a headache     Last Neurology office visit: 2/18/25     A 200 unit vial of Botox was diluted with 4ml of 0.9% sodium chloride     Botox lot # and expiration date: See MAR for details  0.9% sodium chloride lot # and expiration date: See MAR for details     The following muscles were injected using a 30 gauge needle:  Procerus 1 site 5 units   2 sites (bilat) 10 units  Frontalis 4 sites (bilat) 20 units  Temporalis 8 sites (bilat) 40 units  Trapezius muscles 6 sites (bilat) 30 units  Cervical paraspinals (bilat) 4 sites 20 units  Occipitalis 6 sites (bilat) 30 units     A total of 155 units were injected, 45 wasted.   The patient tolerated the injections well. I was present for and performed the entire procedure    Clarissa Menard MD

## 2025-04-17 DIAGNOSIS — G43.709 CHRONIC MIGRAINE WITHOUT AURA WITHOUT STATUS MIGRAINOSUS, NOT INTRACTABLE: Primary | ICD-10-CM

## 2025-05-13 ENCOUNTER — OFFICE VISIT (OUTPATIENT)
Dept: NEUROLOGY | Facility: CLINIC | Age: 80
End: 2025-05-13
Payer: COMMERCIAL

## 2025-05-13 DIAGNOSIS — G43.709 CHRONIC MIGRAINE WITHOUT AURA WITHOUT STATUS MIGRAINOSUS, NOT INTRACTABLE: Primary | ICD-10-CM

## 2025-05-13 ASSESSMENT — HEADACHE IMPACT TEST (HIT 6)
HOW OFTEN DID HEADACHS LIMIT CONCENTRATION ON WORK OR DAILY ACTIVITY: SOMETIMES
HOW OFTEN HAVE YOU FELT TOO TIRED TO WORK BECAUSE OF YOUR HEADACHES: VERY OFTEN
WHEN YOU HAVE A HEADACHE HOW OFTEN DO YOU WISH YOU COULD LIE DOWN: SOMETIMES
HOW OFTEN HAVE YOU FELT FED UP OR IRRITATED BECAUSE OF YOUR HEADACHES: VERY OFTEN

## 2025-05-13 NOTE — PROGRESS NOTES
Botulinum Toxin Procedure Note     Mackenzie Brown  [id number]     Ordering provider: La Nena Rolle CNP/Clarissa Menard MD     The procedure was explained to the patient. Benefits of the treatment were discussed including headache and migraine reduction. Risks of the procedure were reviewed including but not limited to pain, bruising, bleeding, infection, weakness of muscles injected or those distal to injection. The patient voiced understanding of the risks and benefits. All questions answered and the patient consented to proceed.      Prior to receiving Botox injections the patient reported the following:  Baseline headache/migraine frequency: daily x 2 years  Baseline headache/migraine duration: constant  Associated migrainous features: nausea, photophobia     Previous treatment trials:  Topiramate ineffective  On multiple antidepressants can not add TCAs safelt given age and concommittant use of bupropion, paroxetine and trazodone  Nurtec prn     Last Botox procedure: 2/18/25  Current migraine frequency: 20 headache days over 3 months. Not migraines, awakening with them  Current migraine duration:  few hours    Taking Tylenol daily, instructed to stop.      Functional improvements since starting botulinum toxin treatments: Not having to lay down with a headache     Last Neurology office visit: 2/18/25     A 200 unit vial of Botox was diluted with 4ml of 0.9% sodium chloride     Botox lot # and expiration date: See MAR for details  0.9% sodium chloride lot # and expiration date: See MAR for details     The following muscles were injected using a 30 gauge needle:  Procerus 1 site 5 units   2 sites (bilat) 10 units  Frontalis 4 sites (bilat) 20 units  Temporalis 8 sites (bilat) 40 units  Trapezius muscles 6 sites (bilat) 30 units  Cervical paraspinals (bilat) 4 sites 20 units  Occipitalis 6 sites (bilat) 30 units     A total of 155 units were injected, 45 wasted.   The patient tolerated the injections  well. I was present for and performed the entire procedure      Clarissa Menard MD

## 2025-05-13 NOTE — LETTER
5/13/2025       RE: Mackenzie Brown  [address]  [facility] [address]     Dear Colleague,    Thank you for referring your patient, Mackenzie Brown, to the [facility] [place] [facility]. Please see a copy of my visit note below.    Botulinum Toxin Procedure Note     Mackenzie Brown  [id number]     Ordering provider: La Nena Rolle CNP/Clarissa Menard MD     The procedure was explained to the patient. Benefits of the treatment were discussed including headache and migraine reduction. Risks of the procedure were reviewed including but not limited to pain, bruising, bleeding, infection, weakness of muscles injected or those distal to injection. The patient voiced understanding of the risks and benefits. All questions answered and the patient consented to proceed.      Prior to receiving Botox injections the patient reported the following:  Baseline headache/migraine frequency: daily x 2 years  Baseline headache/migraine duration: constant  Associated migrainous features: nausea, photophobia     Previous treatment trials:  Topiramate ineffective  On multiple antidepressants can not add TCAs safelt given age and concommittant use of bupropion, paroxetine and trazodone  Nurtec prn     Last Botox procedure: 2/18/25  Current migraine frequency: 20 headache days over 3 months. Not migraines, awakening with them  Current migraine duration:  few hours    Taking Tylenol daily, instructed to stop.      Functional improvements since starting botulinum toxin treatments: Not having to lay down with a headache     Last Neurology office visit: 2/18/25     A 200 unit vial of Botox was diluted with 4ml of 0.9% sodium chloride     Botox lot # and expiration date: See MAR for details  0.9% sodium chloride lot # and expiration date: See MAR for details     The following muscles were injected using a 30 gauge needle:  Procerus 1 site 5  units   2 sites (bilat) 10 units  Frontalis 4 sites (bilat) 20 units  Temporalis 8 sites (bilat) 40 units  Trapezius muscles 6 sites (bilat) 30 units  Cervical paraspinals (bilat) 4 sites 20 units  Occipitalis 6 sites (bilat) 30 units     A total of 155 units were injected, 45 wasted.   The patient tolerated the injections well. I was present for and performed the entire procedure      Clarissa Menard MD    Again, thank you for allowing me to participate in the care of your patient.      Sincerely,    Clarissa Menard MD

## 2025-07-06 ENCOUNTER — HEALTH MAINTENANCE LETTER (OUTPATIENT)
Age: 80
End: 2025-07-06

## 2025-07-19 ENCOUNTER — APPOINTMENT (OUTPATIENT)
Dept: GENERAL RADIOLOGY | Facility: CLINIC | Age: 80
End: 2025-07-19
Attending: STUDENT IN AN ORGANIZED HEALTH CARE EDUCATION/TRAINING PROGRAM
Payer: COMMERCIAL

## 2025-07-19 ENCOUNTER — HOSPITAL ENCOUNTER (EMERGENCY)
Facility: CLINIC | Age: 80
Discharge: HOME | End: 2025-07-19
Attending: STUDENT IN AN ORGANIZED HEALTH CARE EDUCATION/TRAINING PROGRAM | Admitting: STUDENT IN AN ORGANIZED HEALTH CARE EDUCATION/TRAINING PROGRAM
Payer: COMMERCIAL

## 2025-07-19 VITALS
DIASTOLIC BLOOD PRESSURE: 53 MMHG | BODY MASS INDEX: 30.46 KG/M2 | HEART RATE: 60 BPM | SYSTOLIC BLOOD PRESSURE: 127 MMHG | WEIGHT: 178.4 LBS | OXYGEN SATURATION: 93 % | HEIGHT: 64 IN | RESPIRATION RATE: 29 BRPM | TEMPERATURE: 98.7 F

## 2025-07-19 DIAGNOSIS — R06.02 SHORTNESS OF BREATH: ICD-10-CM

## 2025-07-19 DIAGNOSIS — I50.9 ACUTE ON CHRONIC CONGESTIVE HEART FAILURE, UNSPECIFIED HEART FAILURE TYPE (H): ICD-10-CM

## 2025-07-19 LAB
ANION GAP SERPL CALCULATED.3IONS-SCNC: 14 MMOL/L (ref 7–15)
BASOPHILS # BLD AUTO: 0 10E3/UL (ref 0–0.2)
BASOPHILS NFR BLD AUTO: 0 %
BUN SERPL-MCNC: 21 MG/DL (ref 8–23)
CALCIUM SERPL-MCNC: 9.3 MG/DL (ref 8.8–10.4)
CHLORIDE SERPL-SCNC: 101 MMOL/L (ref 98–107)
CREAT SERPL-MCNC: 0.87 MG/DL (ref 0.51–0.95)
EGFRCR SERPLBLD CKD-EPI 2021: 67 ML/MIN/1.73M2
EOSINOPHIL # BLD AUTO: 0.2 10E3/UL (ref 0–0.7)
EOSINOPHIL NFR BLD AUTO: 2 %
ERYTHROCYTE [DISTWIDTH] IN BLOOD BY AUTOMATED COUNT: 15.5 % (ref 10–15)
GLUCOSE SERPL-MCNC: 120 MG/DL (ref 70–99)
HCO3 SERPL-SCNC: 26 MMOL/L (ref 22–29)
HCT VFR BLD AUTO: 39.6 % (ref 35–47)
HGB BLD-MCNC: 12.7 G/DL (ref 11.7–15.7)
HOLD SPECIMEN: NORMAL
IMM GRANULOCYTES # BLD: 0 10E3/UL
IMM GRANULOCYTES NFR BLD: 0 %
LYMPHOCYTES # BLD AUTO: 1.6 10E3/UL (ref 0.8–5.3)
LYMPHOCYTES NFR BLD AUTO: 18 %
MCH RBC QN AUTO: 30 PG (ref 26.5–33)
MCHC RBC AUTO-ENTMCNC: 32.1 G/DL (ref 31.5–36.5)
MCV RBC AUTO: 93 FL (ref 78–100)
MONOCYTES # BLD AUTO: 0.7 10E3/UL (ref 0–1.3)
MONOCYTES NFR BLD AUTO: 8 %
NEUTROPHILS # BLD AUTO: 6 10E3/UL (ref 1.6–8.3)
NEUTROPHILS NFR BLD AUTO: 71 %
NRBC # BLD AUTO: 0 10E3/UL
NRBC BLD AUTO-RTO: 0 /100
NT-PROBNP SERPL-MCNC: 1179 PG/ML (ref 0–624)
PLATELET # BLD AUTO: 272 10E3/UL (ref 150–450)
POTASSIUM SERPL-SCNC: 4.1 MMOL/L (ref 3.4–5.3)
RBC # BLD AUTO: 4.24 10E6/UL (ref 3.8–5.2)
SODIUM SERPL-SCNC: 141 MMOL/L (ref 135–145)
TROPONIN T SERPL HS-MCNC: 10 NG/L
WBC # BLD AUTO: 8.4 10E3/UL (ref 4–11)

## 2025-07-19 PROCEDURE — 93010 ELECTROCARDIOGRAM REPORT: CPT | Performed by: STUDENT IN AN ORGANIZED HEALTH CARE EDUCATION/TRAINING PROGRAM

## 2025-07-19 PROCEDURE — 84484 ASSAY OF TROPONIN QUANT: CPT | Performed by: STUDENT IN AN ORGANIZED HEALTH CARE EDUCATION/TRAINING PROGRAM

## 2025-07-19 PROCEDURE — 99284 EMERGENCY DEPT VISIT MOD MDM: CPT | Performed by: STUDENT IN AN ORGANIZED HEALTH CARE EDUCATION/TRAINING PROGRAM

## 2025-07-19 PROCEDURE — 71046 X-RAY EXAM CHEST 2 VIEWS: CPT

## 2025-07-19 PROCEDURE — 80048 BASIC METABOLIC PNL TOTAL CA: CPT | Performed by: STUDENT IN AN ORGANIZED HEALTH CARE EDUCATION/TRAINING PROGRAM

## 2025-07-19 PROCEDURE — 250N000011 HC RX IP 250 OP 636: Performed by: STUDENT IN AN ORGANIZED HEALTH CARE EDUCATION/TRAINING PROGRAM

## 2025-07-19 PROCEDURE — 93005 ELECTROCARDIOGRAM TRACING: CPT

## 2025-07-19 PROCEDURE — 96374 THER/PROPH/DIAG INJ IV PUSH: CPT | Mod: 59

## 2025-07-19 PROCEDURE — 85025 COMPLETE CBC W/AUTO DIFF WBC: CPT | Performed by: STUDENT IN AN ORGANIZED HEALTH CARE EDUCATION/TRAINING PROGRAM

## 2025-07-19 PROCEDURE — 76604 US EXAM CHEST: CPT

## 2025-07-19 PROCEDURE — 83880 ASSAY OF NATRIURETIC PEPTIDE: CPT | Performed by: STUDENT IN AN ORGANIZED HEALTH CARE EDUCATION/TRAINING PROGRAM

## 2025-07-19 PROCEDURE — 99285 EMERGENCY DEPT VISIT HI MDM: CPT | Mod: 25 | Performed by: STUDENT IN AN ORGANIZED HEALTH CARE EDUCATION/TRAINING PROGRAM

## 2025-07-19 PROCEDURE — 36415 COLL VENOUS BLD VENIPUNCTURE: CPT | Performed by: STUDENT IN AN ORGANIZED HEALTH CARE EDUCATION/TRAINING PROGRAM

## 2025-07-19 RX ORDER — FUROSEMIDE 20 MG/1
20 TABLET ORAL
Qty: 14 TABLET | Refills: 0 | Status: SHIPPED | OUTPATIENT
Start: 2025-07-19 | End: 2025-07-26

## 2025-07-19 RX ORDER — FUROSEMIDE 10 MG/ML
40 INJECTION INTRAMUSCULAR; INTRAVENOUS ONCE
Status: COMPLETED | OUTPATIENT
Start: 2025-07-19 | End: 2025-07-19

## 2025-07-19 RX ADMIN — FUROSEMIDE 40 MG: 10 INJECTION, SOLUTION INTRAVENOUS at 21:41

## 2025-07-19 ASSESSMENT — COLUMBIA-SUICIDE SEVERITY RATING SCALE - C-SSRS
1. IN THE PAST MONTH, HAVE YOU WISHED YOU WERE DEAD OR WISHED YOU COULD GO TO SLEEP AND NOT WAKE UP?: NO
2. HAVE YOU ACTUALLY HAD ANY THOUGHTS OF KILLING YOURSELF IN THE PAST MONTH?: NO
6. HAVE YOU EVER DONE ANYTHING, STARTED TO DO ANYTHING, OR PREPARED TO DO ANYTHING TO END YOUR LIFE?: NO

## 2025-07-19 ASSESSMENT — ACTIVITIES OF DAILY LIVING (ADL)
ADLS_ACUITY_SCORE: 41
ADLS_ACUITY_SCORE: 41

## 2025-07-20 LAB
ATRIAL RATE - MUSE: 78 BPM
DIASTOLIC BLOOD PRESSURE - MUSE: NORMAL MMHG
INTERPRETATION ECG - MUSE: NORMAL
P AXIS - MUSE: NORMAL DEGREES
PR INTERVAL - MUSE: NORMAL MS
QRS DURATION - MUSE: 108 MS
QT - MUSE: 436 MS
QTC - MUSE: 460 MS
R AXIS - MUSE: -33 DEGREES
SYSTOLIC BLOOD PRESSURE - MUSE: NORMAL MMHG
T AXIS - MUSE: 45 DEGREES
VENTRICULAR RATE- MUSE: 67 BPM

## 2025-07-20 NOTE — DISCHARGE INSTRUCTIONS
Your x-rays and labs were fairly reassuring.  Your heart failure test was slightly elevated.  I think that your fluid retention is likely due to mild heart failure.  I think you would benefit from being on more consistent diuretic.  You were given a dose of IV diuretic in the ER with good response.  I want you to start taking Lasix 20 mg twice per day for the next 7 days.  It is important you follow-up with your regular doctor within the next couple of days to have your labs rechecked to make sure your kidneys and electrolytes are tolerating the increased diuretic.  At that appointment, they can determine if you need to stay on the diuretic long-term or not.  Try to keep track of your weights.  Return to the ER if you develop worsening trouble breathing, chest pain or any other concerning symptoms.

## 2025-07-20 NOTE — ED PROVIDER NOTES
History     Chief Complaint   Patient presents with    Shortness of Breath     HPI  Mackenzie Brown is a 79 year old female who has hyperlipidemia, anxiety, history of A-fib not on anticoagulation, status post Watchman device, chronic headaches, heart failure with preserved ejection fraction, IBS, mild cognitive impairment, tachybradycardia syndrome status post pacemaker who presents to the ER for evaluation of shortness of breath and leg swelling.    The patient states that she has had some increased swelling, shortness of breath and weight gain over the last 4 to 5 days.  She has Lasix that she takes as needed.  She took 20 mg of oral Lasix 4 days ago and she had a good response and was feeling much better.  However, last night she started having recurrence of symptoms and today she took a total of 40 mg of Lasix without any improvement, so she presented to the ER.  She states that she feels best when her weights are between 165 and 170.  Her weight today is 178.  She does not weigh herself daily.  Her daughter believes that she is post to be on a daily diuretic, but the patient believes she is only supposed to be on it as needed.  She endorses orthopnea.  She has dyspnea on exertion.  She is not short of breath at rest.  She has no chest pain.  No fever or recent illness.  No abdominal pain.  No nausea or vomiting.  No diarrhea, melena or hematochezia.  No new medications.  [place] the [employer]    Allergies:  Allergies   Allergen Reactions    Demerol [Meperidine] Unknown    Levofloxacin Unknown    Metronidazole Unknown       Problem List:    Patient Active Problem List    Diagnosis Date Noted    Change in bowel habits 10/11/2022     Priority: Medium    Senile dementia, uncomplicated (H) 10/06/2022     Priority: Medium    Presence of Watchman left atrial appendage closure device 10/06/2022     Priority: Medium    Closed nondisplaced fracture of fifth cervical vertebra (H) 10/06/2022     Priority:  Medium    Closed fracture of proximal end of humerus 10/06/2022     Priority: Medium    Acute on chronic respiratory failure with hypoxemia (H) 10/06/2022     Priority: Medium    B12 deficiency 04/11/2022     Priority: Medium    Tachy-braydon syndrome (H) 12/07/2021     Priority: Medium     Formatting of this note might be different from the original.  PAF with rapid ventricular response, planned pacemaker and AV node ablation.      Chest pain 09/01/2021     Priority: Medium    Postoperative hypothyroidism 02/24/2021     Priority: Medium    EVERT on CPAP 02/24/2021     Priority: Medium    Hypoxemia 02/24/2021     Priority: Medium    Concussion 01/13/2021     Priority: Medium    Amnesia 12/14/2020     Priority: Medium    Chronic headache disorder 12/14/2020     Priority: Medium    Depression with anxiety 12/14/2020     Priority: Medium    Diverticulitis 12/14/2020     Priority: Medium    GERD (gastroesophageal reflux disease) 12/14/2020     Priority: Medium    Insomnia 12/14/2020     Priority: Medium    Obesity 12/14/2020     Priority: Medium    Irritable bowel syndrome with diarrhea 10/14/2020     Priority: Medium    Lactose intolerance 10/14/2020     Priority: Medium    Hemorrhoids 01/30/2020     Priority: Medium    Chronic heart failure with preserved ejection fraction (H) 10/12/2019     Priority: Medium    Hemorrhage of rectum and anus 07/31/2019     Priority: Medium    Irregular bowel habits 07/31/2019     Priority: Medium    PAF (paroxysmal atrial fibrillation) (H) 09/22/2018     Priority: Medium     -- Reported episode in 1997.  Several episodes of suspected AF since then, but   on further review shows sinus arrhythmia with PAC's.  -- Reported episode in 1997.  Several episodes of suspected AF since then, but on further review shows sinus arrhythmia with PAC's.  Admitted 9/27/2012, diltiazem drip, converted, started on metorpolol, (not taken)   Took metoprolol and developed fatigue, switched to  diltiazem.    Formatting of this note might be different from the original.  4/11/2022 - watchman march 2022      Blood typing encounter 03/20/2017     Priority: Medium     Formatting of this note might be different from the original.  Patient has identified Health Care Agent(s): Yes  Add Health Care Agents: Yes    Health Care Agent(s):  Primary Health Care Agent: Kristal Brown Relationship: Daughter Phone: [phone] (H)   Secondary Health Care Agent:  Relationship:  Phone:    Conservator:  Relationship:  Phone:    Guardian: Relationship:  Phone:      Patient has Advance Care Plan Documents (Health Care Directive, POLST): No, Declined to address it at this time.    Patient has identified Specific Treatment Preferences: Yes   Specific Treatment Preferences: a.) Code Status:  CPR/Attempt Resuscitation    Blood Bank Antibody(s) Present.  Allow 4 to 36 hours for compatible RBC's.    ; ALERT - Blood Typing Antibody      Spondylosis of lumbar region without myelopathy or radiculopathy 03/07/2017     Priority: Medium     MRI done 2/2017 when seen by rheumatologist.  Involving both L4-5 and L3-4.      Primary osteoarthritis involving multiple joints 02/22/2017     Priority: Medium     2/20/2017 Many joints, knees, fingers, seen by Dr. Arce, private practice in [place], xray's and labs drawn      Cataracts, both eyes 10/19/2016     Priority: Medium    Knee pain, left 10/23/2015     Priority: Medium     History DJD as well as patellar fracture and tibial fracture      Hypertension 07/28/2015     Priority: Medium    Fibula fracture 04/27/2015     Priority: Medium    Tibial plateau fracture 04/27/2015     Priority: Medium     Tibial plateau fracture, comminuted      Toxic multinodular goiter 02/27/2015     Priority: Medium     Formatting of this note might be different from the original.  Saw Dr. Marina 2/15  FNA of 2 dominant nodules benign.  Recommended follow up ultrasound 2/16; if no significant change, repeat  "2/18, and if still no change, no need for further follow up ultrasounds.  If nodules grow in size/#, refer back.  TSH test every 1-2 yrs  4/2016 slight increase in size, note from Dr. Marina:   The nodule that is a bit bigger is basically a large cyst; we don't get too concerned when these grow, and it wouldn't prompt another biopsy.  The new nodules also aren't a concern given the small size.I would suggest another ultrasound in a year, and send her back my way afterward to review results and see if we need to do anything else.  Anthony Marina MD 4/21/2016 4:57 PM   9/9/2018 ultrasound done in [facility], found to be benign.No follow up needed.    Results for MARY ELLEN FLORES \"POLY\" (MRN [id number]) as of 11/15/2020 12:32   Ref. Range 1/17/2020 13:01 10/14/2020 11:59 10/29/2020 10:18 11/9/2020 11:33   TSH Latest Ref Range: 0.35 - 4.94 uIU/mL 0.66 0.31 (L) 0.16 (L) 0.25 (L)   T4,FREE Latest Ref Range: 0.70 - 1.80 ng/dL 0.86  0.75 0.67 (L)   T3,TOTAL Latest Ref Range: 58 - 159 ng/dL   111    THYROPEROXIDASE ZEESHAN Latest Ref Range: <5.61 IU/mL 0.62      TSI Latest Ref Range: 0.00 - 0.55 IU/L    <0.10     11/2020 -FINDINGS: 24-hour uptake is 25.7%. (Normal range: 10-35%).     Asymmetric nodular uptake in the mid right thyroid lobe and inferior left thyroid lobe with suppression of the remaining thyroid parenchyma suspicious for toxic multinodular goiter  11/16/2020- start methimazole 5 mg per day.    Formatting of this note might be different from the original.  2/23/21 total thyroidectomy-  A) THYROID, RIGHT LOBE, TOTAL THYROIDECTOMY:   1. Multinodular goiter   2. Negative for atypia and malignancy   3. No parathyroid glands identified   4. No lymph nodes identified     B) THYROID, LEFT LOBE, TOTAL THYROIDECTOMY:   1. Multinodular goiter   2. Negative for atypia and malignancy   3. No parathyroid glands identified   4. No lymph nodes identified      Diverticular disease of colon 02/16/2015     Priority: Medium    " Mild cognitive impairment 10/23/2014     Priority: Medium     Dr. Harrington at [facility]. Began Aricept. 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 10/2014  Patient stopped Aricept.   Seen in follow-up February 2016, recommended restarting the Aricept as it seemed to have helped with clarity of thought.  MRI suggested.  4/2017 seen, doing well, continue Aricept.   5/2019 doing well, likely not Alzheimer's due to stability in symptoms. To continue Aricept as it is well tolerated, follow up there as needed.      Senile osteoporosis 04/15/2012     Priority: Medium     Your recent bone density shows osteoporosis. Please make an appointment to   discuss this result, do some other labs and discuss possible treatment.  Bonnie Serrano MD ....................  4/15/2012   7:43 AM  Alendronate started 5/2012, was a 3 month lapse, restarted.   Stopped it for about three months. Discussed, declided to restart, plan for two more years. Should be done in 2/2018,      AK (actinic keratosis) 12/21/2011     Priority: Medium     Plan to Efudex upper arms BID x 1 wks Winter [id number].  1/31/17, PDT #2, bilateral forearms, 4 H incubation  11/3/16- PDT x 1 to arms w/ 4 hour incubation.   Fall 2016 Efudex BID knees to ankles x 6 wks.      Major depressive disorder, recurrent episode, mild 11/28/2011     Priority: Medium    Sensorineural hearing loss, asymmetrical 11/23/2011     Priority: Medium    Wrist fracture, left 10/28/2011     Priority: Medium    Bunion 01/25/2011     Priority: Medium    History of colonic polyps 01/24/2011     Priority: Medium     4/2010 , repeat 3 years    Epic  4/2010 , repeat 3 years  2/16/2015 normal colonoscopy      Adrenal nodule 11/08/2010     Priority: Medium     Per Dr. Armendariz: Adrenal incidentaloma.  An overnight suppression test with 1 mg   of dexamethasone provides a better measure of adrenal autonomy and should be   done in the future. In the absence of a history of hypertension,  screening for   hyperaldosteronemia would not be cost effective and is not supported by   current evidence. The tests for pheochromocytoma have been ordered. The above   tests should probably be repeated in a year to exclude the rare hormonally   active nodule that may be missed with initial screening.   This is most likely   a benign adrenal nodule on the basis of only a slight enlargement (less than   1 cm) over a course of 6 years. A follow up CT would pose too much radiation   exposure and I would recommend an MRI study in one year to see if there are   any changes. If stable, I would advise no more imaging. 11/2010  Found incidentally February 2017 when evaluating left groin pain.    Discussed with radiology, MRI done in 2012 for evaluation of adrenal adenoma will be compared.  No change in size compared to 2012.   CT without contrast showed it to be a benign adenoma.  Normal dexamethasone suppression test.      Eczema 10/05/2010     Priority: Medium    Diverticulitis of colon 09/19/2010     Priority: Medium     Acute sigmoid 9/19/2010, treated surgically by Dr. Dada Alvarado, removed 18   cm of colon, complicated by fluid overload and effusions, repeat chest   radiograph in one month recommended.    Epic  Acute sigmoid 9/19/2010, treated surgically by Dr. Dada Alvarado, removed 18 cm of colon, complicated by fluid overload and effusions, repeat chest radiograph in one month recommended.      Malignant neoplasm of skin 11/11/2009     Priority: Medium     1990's BCC back Dr. Donaldo Hernandez, early 200's L inframammary Dr. Hernandez  BCC,   Right Chest, resolved S/P skin biopsy (2/12)  10/2017 Right dorsal lateral hand, SCCIS;  Mohs Christensen   10/2017 Left medial calf, atypical fibroxanthoma,  Mohs w/ Fazal 1/2018 2/16-Left lat.low leg, SCCIS, ED&C and Left low chest, BCC, EDC.   1990's BCC back Dr. Donaldo Hernandez, early 200's L inframammary Dr. Hernandez  BCC, Right Chest, resolved S/P skin biopsy (2/12)  BCC  right lateral shoulder, 4/13, ED&C'd  SCCIS, right abdomen, excised 4/13   SCCIS, margins close. Treat with Efudex (has at home) BID x 6 weeks 11/16  Left anterior distal thigh, AK, biopsy should be curative. No further action needed at this time.11/16      Chronic back pain 10/29/2009     Priority: Medium    Complications of medical care 10/29/2009     Priority: Medium     Migraine, unspecified, without mention of intractable migraine without mention of status migrainosus      Chronic migraine without aura without status migrainosus, not intractable 10/29/2009     Priority: Medium     Epic      Osteoarthrosis 10/29/2009     Priority: Medium    Neck pain 10/29/2009     Priority: Medium        Past Medical History:    No past medical history on file.    Past Surgical History:    No past surgical history on file.    Family History:    No family history on file.    Social History:  Marital Status:   [2]  Social History     Tobacco Use    Smoking status: Never    Smokeless tobacco: Never   Substance Use Topics    Alcohol use: Yes    Drug use: Never        Medications:    furosemide (LASIX) 20 MG tablet  acetaminophen (TYLENOL) 500 MG tablet  amLODIPine (NORVASC) 5 MG tablet  aspirin-acetaminophen-caffeine (EXCEDRIN MIGRAINE) 250-250-65 MG tablet  buPROPion (WELLBUTRIN XL) 150 MG 24 hr tablet  Calcium 200 MG TABS  Calcium-Magnesium-Vitamin D (CITRACAL SLOW RELEASE) 600- MG-MG-UNIT TB24  cholecalciferol 50 MCG (2000 UT) CAPS  Cyanocobalamin 1000 MCG CAPS  donepezil (ARICEPT) 10 MG tablet  irbesartan (AVAPRO) 150 MG tablet  levothyroxine (SYNTHROID/LEVOTHROID) 125 MCG tablet  nitroGLYcerin (NITROSTAT) 0.4 MG sublingual tablet  pantoprazole sodium (PROTONIX) 40 MG packet  PARoxetine (PAXIL) 40 MG tablet  rosuvastatin (CRESTOR) 20 MG tablet  traMADol (ULTRAM) 50 MG tablet  traZODone (DESYREL) 50 MG tablet          Review of Systems  See HPI  Physical Exam   BP: 136/64  Pulse: 62  Temp: 98.7  F (37.1  C)  Resp:  "17  Height: 162.6 cm (5' 4\")  Weight: 80.9 kg (178 lb 6.4 oz)  SpO2: (!) 91 %      Physical Exam  /53   Pulse 60   Temp 98.7  F (37.1  C) (Oral)   Resp 29   Ht 1.626 m (5' 4\")   Wt 80.9 kg (178 lb 6.4 oz)   SpO2 93%   BMI 30.62 kg/m    General: alert, interactive, in no apparent distress  Head: atraumatic  Nose: no rhinorrhea or epistaxis  Ears: no external auditory canal discharge or bleeding.    Eyes: Sclera nonicteric. Conjunctiva noninjected. PERRL, EOMI  Mouth: no tonsillar erythema, edema, or exudate.  Moist mucous membranes  Neck: supple, moving spontaneously no midline cervical tenderness  Lungs: No increased work of breathing.  Clear to auscultation bilaterally.  CV: RRR, peripheral pulses palpable and symmetric  Abdomen: soft, nt, nd, no guarding or rebound.   Extremities: Warm and well-perfused.  Bilateral feet extending up to the mid shin.  No erythema or calf tenderness bilaterally  Skin: no rash or diaphoresis  Neuro: CN II-XII grossly intact, strength 5/5 in UE and LEs bilaterally, sensation intact to light touch in UE and LEs bilaterally;     ED Course        Procedures    Results for orders placed during the hospital encounter of 07/19/25    POC US CHEST B-SCAN    Narrative  [facility] Procedure Note    Limited Bedside ED Ultrasound of Thorax:    PROCEDURE: PERFORMED BY: Dr. Anatoliy Isaacs MD  INDICATIONS/SYMPTOM:  shortness of breath  PROBE: High frequency linear probe  BODY LOCATION: Chest  FINDINGS:  Images of both lung hemithoracies taken in 2D in multiple rib spaces    Right side:  Lung sliding artifact  Present  Comet tail artifacts  minimal to no B-lines noted  Left side:  Lung sliding artifact  Present  Comet tail artifacts  small amount of B-lines noted      INTERPRETATION:  Minimal B-lines noted which could suggest mild pulmonary edema no evidence of pneumothorax.  IMAGE DOCUMENTATION: Images were archived to hard drive.            EKG Interpretation:      Interpreted by " Anatoliy Isaacs MD  Time reviewed: 9:22 PM    Symptoms at time of EKG: SOB   Rhythm: paced  Rate: Normal  Axis: Normal  Ectopy: none  Conduction: normal  ST Segments/ T Waves: Non-specific ST-T wave changes  Q Waves: none  Comparison to prior: No old EKG available    Clinical Impression: paced rhythm            Critical Care time:  none             Recent Results (from the past 24 hours)   EKG 12-lead, tracing only   Result Value Ref Range    Systolic Blood Pressure  mmHg    Diastolic Blood Pressure  mmHg    Ventricular Rate 67 BPM    Atrial Rate 78 BPM    MT Interval  ms    QRS Duration 108 ms     ms    QTc 460 ms    P Axis  degrees    R AXIS -33 degrees    T Axis 45 degrees    Interpretation ECG       Ventricular-paced rhythm  Abnormal ECG  No previous ECGs available     [place] Draw    Narrative    The following orders were created for panel order [place] Draw.  Procedure                               Abnormality         Status                     ---------                               -----------         ------                     Extra Blue Top Tube[[id number]]                             Final result               Extra Red Top Tube[[id number]]                                                         Extra Green Top (Lithiu...[[id number]]                      Final result               Extra Purple Top Tube[[id number]]                           Final result                 Please view results for these tests on the individual orders.   Extra Blue Top Tube   Result Value Ref Range    Hold Specimen JIC    Extra Green Top (Lithium Heparin) Tube   Result Value Ref Range    Hold Specimen JIC    Extra Purple Top Tube   Result Value Ref Range    Hold Specimen JIC    CBC with platelets differential    Narrative    The following orders were created for panel order CBC with platelets differential.  Procedure                               Abnormality         Status                     ---------                                -----------         ------                     CBC with platelets and ...[[id number]]  Abnormal            Final result                 Please view results for these tests on the individual orders.   Basic metabolic panel   Result Value Ref Range    Sodium 141 135 - 145 mmol/L    Potassium 4.1 3.4 - 5.3 mmol/L    Chloride 101 98 - 107 mmol/L    Carbon Dioxide (CO2) 26 22 - 29 mmol/L    Anion Gap 14 7 - 15 mmol/L    Urea Nitrogen 21.0 8.0 - 23.0 mg/dL    Creatinine 0.87 0.51 - 0.95 mg/dL    GFR Estimate 67 >60 mL/min/1.73m2    Calcium 9.3 8.8 - 10.4 mg/dL    Glucose 120 (H) 70 - 99 mg/dL   Troponin T, High Sensitivity   Result Value Ref Range    Troponin T, High Sensitivity 10 <=14 ng/L   NT-proBNP   Result Value Ref Range    NT-proBNP 1,179 (H) 0 - 624 pg/mL   CBC with platelets and differential   Result Value Ref Range    WBC Count 8.4 4.0 - 11.0 10e3/uL    RBC Count 4.24 3.80 - 5.20 10e6/uL    Hemoglobin 12.7 11.7 - 15.7 g/dL    Hematocrit 39.6 35.0 - 47.0 %    MCV 93 78 - 100 fL    MCH 30.0 26.5 - 33.0 pg    MCHC 32.1 31.5 - 36.5 g/dL    RDW 15.5 (H) 10.0 - 15.0 %    Platelet Count 272 150 - 450 10e3/uL    % Neutrophils 71 %    % Lymphocytes 18 %    % Monocytes 8 %    % Eosinophils 2 %    % Basophils 0 %    % Immature Granulocytes 0 %    NRBCs per 100 WBC 0 <1 /100    Absolute Neutrophils 6.0 1.6 - 8.3 10e3/uL    Absolute Lymphocytes 1.6 0.8 - 5.3 10e3/uL    Absolute Monocytes 0.7 0.0 - 1.3 10e3/uL    Absolute Eosinophils 0.2 0.0 - 0.7 10e3/uL    Absolute Basophils 0.0 0.0 - 0.2 10e3/uL    Absolute Immature Granulocytes 0.0 <=0.4 10e3/uL    Absolute NRBCs 0.0 10e3/uL   POC US CHEST B-SCAN    [facility] Procedure Note      Limited Bedside ED Ultrasound of Thorax:    PROCEDURE: PERFORMED BY: Dr. Anatoliy Isaacs MD  INDICATIONS/SYMPTOM:  shortness of breath  PROBE: High frequency linear probe  BODY LOCATION: Chest  FINDINGS:  Images of both lung hemithoracies taken in 2D in multiple rib  spaces        Right side:  Lung sliding artifact  Present     Comet tail artifacts  minimal to no B-lines noted   Left side:  Lung sliding artifact  Present     Comet tail artifacts  small amount of B-lines noted       INTERPRETATION:  Minimal B-lines noted which could suggest mild pulmonary edema no evidence of pneumothorax.  IMAGE DOCUMENTATION: Images were archived to hard drive.        XR Chest 2 Views    Narrative    EXAM: XR CHEST 2 VIEWS  LOCATION: [facility]  DATE: 7/19/2025    INDICATION: Shortness of breath, orthopnea,  weight gain  COMPARISON: 5/13/2023.    FINDINGS: Left subclavian cardiac device. Closure device in the heart. The heart size is normal. The thoracic aorta is calcified and tortuous. Few bands of scar and atelectasis in lower lungs. The lungs are otherwise clear. No pneumothorax or pleural   effusion. Degenerative disease in the spine.      Impression    IMPRESSION: Mild bibasilar atelectasis and scar.       Medications   furosemide (LASIX) injection 40 mg (40 mg Intravenous $Given 7/19/25 2141)       Assessments & Plan (with Medical Decision Making)     I have reviewed the nursing notes.    I have reviewed the findings, diagnosis, plan and need for follow up with the patient.          Medical Decision Making  Mackenzie Brown is a 79 year old female who has hyperlipidemia, anxiety, history of A-fib not on anticoagulation, status post Watchman device, chronic headaches, heart failure with preserved ejection fraction, IBS, mild cognitive impairment, tachybradycardia syndrome status post pacemaker who presents to the ER for evaluation of shortness of breath and leg swelling.  Vital signs reviewed and reassuring.  Patient is up about 10 pounds from her baseline and does have some evidence of fluid overload on exam.  Most concern for CHF.  Her ultrasound is actually relatively reassuring with good cardiac squeeze and no significant pulmonary edema.  Her EKG is  reassuring.  Labs are notable for normal troponin, normal CBC and normal BMP.  BNP is elevated at 1179.  Chest x-ray shows mild atelectasis, but otherwise no obvious pulmonary edema.  I did give the patient a dose of IV Lasix and she has had good response.  She is feeling better.  I think outpatient management is appropriate.  Patient would likely benefit from increased diuresis.  I am going to place her on 40 mg of oral Lasix per day for the next week and have her see her primary for recheck in the next couple of days.  She will need her labs rechecked to ensure she is tolerating the increased diuretic.  Patient is comfortable with this plan.  Additional reassurance anticipatory guidance discussed.  Return precautions discussed as well.        New Prescriptions    No medications on file       Final diagnoses:   Shortness of breath   Acute on chronic congestive heart failure, unspecified heart failure type (H)       7/19/2025   [facility] EMERGENCY DEPT       Anatoliy Isaacs MD  07/19/25 [id number]

## 2025-07-20 NOTE — ED TRIAGE NOTES
Complains of SOB and bilateral ankle swelling.    HX of: Afib, Pacemaker, and Watchman.     Trouble sleeping the last 3-4 days, especially flat. Last night was the worst. [place] and [place] on [place] couch to sleep. Has had SOB the last 1-2 days, especially since 11:00 today.       No Lasix since November per the primary clinic. Per med list printed Nov 2024 was on 20 mg Lasix daily. Did take 40 mg PO today after 11:00.    Normal weight 168 #. Bedscale tonight was 178.4 #

## 2025-08-05 ENCOUNTER — OFFICE VISIT (OUTPATIENT)
Dept: NEUROLOGY | Facility: CLINIC | Age: 80
End: 2025-08-05
Payer: COMMERCIAL

## 2025-08-05 DIAGNOSIS — G43.709 CHRONIC MIGRAINE WITHOUT AURA WITHOUT STATUS MIGRAINOSUS, NOT INTRACTABLE: Primary | ICD-10-CM

## 2025-08-05 PROCEDURE — 64615 CHEMODENERV MUSC MIGRAINE: CPT | Performed by: PSYCHIATRY & NEUROLOGY
